# Patient Record
Sex: FEMALE | Race: WHITE | NOT HISPANIC OR LATINO | Employment: FULL TIME | URBAN - METROPOLITAN AREA
[De-identification: names, ages, dates, MRNs, and addresses within clinical notes are randomized per-mention and may not be internally consistent; named-entity substitution may affect disease eponyms.]

---

## 2017-01-26 RX ORDER — CITALOPRAM 20 MG/1
TABLET ORAL
Qty: 30 TABLET | Refills: 0 | Status: SHIPPED | OUTPATIENT
Start: 2017-01-26 | End: 2017-02-01 | Stop reason: SDUPTHER

## 2017-02-01 RX ORDER — CITALOPRAM 20 MG/1
TABLET ORAL
Qty: 30 TABLET | Refills: 0 | Status: SHIPPED | OUTPATIENT
Start: 2017-02-01 | End: 2017-04-01 | Stop reason: SDUPTHER

## 2017-03-01 RX ORDER — ACETAMINOPHEN AND CODEINE PHOSPHATE 120; 12 MG/5ML; MG/5ML
SOLUTION ORAL
Qty: 28 TABLET | Refills: 0 | Status: SHIPPED | OUTPATIENT
Start: 2017-03-01 | End: 2017-05-26 | Stop reason: SDUPTHER

## 2017-03-06 RX ORDER — ACETAMINOPHEN AND CODEINE PHOSPHATE 120; 12 MG/5ML; MG/5ML
SOLUTION ORAL
Qty: 28 TABLET | Refills: 0 | Status: SHIPPED | OUTPATIENT
Start: 2017-03-06 | End: 2017-04-19 | Stop reason: SDUPTHER

## 2017-04-03 RX ORDER — CITALOPRAM 20 MG/1
TABLET ORAL
Qty: 30 TABLET | Refills: 2 | Status: SHIPPED | OUTPATIENT
Start: 2017-04-03 | End: 2017-04-19 | Stop reason: SDUPTHER

## 2017-04-19 ENCOUNTER — OFFICE VISIT (OUTPATIENT)
Dept: FAMILY MEDICINE CLINIC | Facility: CLINIC | Age: 37
End: 2017-04-19

## 2017-04-19 VITALS
HEART RATE: 90 BPM | SYSTOLIC BLOOD PRESSURE: 124 MMHG | HEIGHT: 66 IN | OXYGEN SATURATION: 98 % | BODY MASS INDEX: 44.2 KG/M2 | RESPIRATION RATE: 16 BRPM | WEIGHT: 275 LBS | DIASTOLIC BLOOD PRESSURE: 84 MMHG

## 2017-04-19 DIAGNOSIS — F32.89 OTHER DEPRESSION: ICD-10-CM

## 2017-04-19 DIAGNOSIS — I10 ESSENTIAL HYPERTENSION: Primary | ICD-10-CM

## 2017-04-19 DIAGNOSIS — Z85.850 HISTORY OF THYROID CANCER: ICD-10-CM

## 2017-04-19 DIAGNOSIS — J30.1 NON-SEASONAL ALLERGIC RHINITIS DUE TO POLLEN: ICD-10-CM

## 2017-04-19 PROCEDURE — 99214 OFFICE O/P EST MOD 30 MIN: CPT | Performed by: FAMILY MEDICINE

## 2017-04-19 RX ORDER — CITALOPRAM 20 MG/1
20 TABLET ORAL DAILY
Qty: 30 TABLET | Refills: 2 | Status: SHIPPED | OUTPATIENT
Start: 2017-04-19 | End: 2017-05-31 | Stop reason: SDUPTHER

## 2017-04-19 RX ORDER — CEFUROXIME AXETIL 250 MG/1
250 TABLET ORAL 2 TIMES DAILY
Qty: 20 TABLET | Refills: 0 | Status: SHIPPED | OUTPATIENT
Start: 2017-04-19 | End: 2017-11-22

## 2017-04-19 RX ORDER — LEVOTHYROXINE SODIUM 0.15 MG/1
150 TABLET ORAL DAILY
Qty: 30 TABLET | Refills: 3 | Status: SHIPPED | OUTPATIENT
Start: 2017-04-19 | End: 2017-07-27 | Stop reason: SDUPTHER

## 2017-04-19 RX ORDER — LOSARTAN POTASSIUM AND HYDROCHLOROTHIAZIDE 12.5; 5 MG/1; MG/1
1 TABLET ORAL DAILY
Qty: 30 TABLET | Refills: 3 | Status: SHIPPED | OUTPATIENT
Start: 2017-04-19 | End: 2017-10-30 | Stop reason: SDUPTHER

## 2017-04-19 RX ORDER — IBUPROFEN 800 MG/1
800 TABLET ORAL EVERY 8 HOURS PRN
Qty: 60 TABLET | Refills: 1 | Status: SHIPPED | OUTPATIENT
Start: 2017-04-19 | End: 2017-05-17 | Stop reason: SDUPTHER

## 2017-04-19 RX ORDER — FLUTICASONE PROPIONATE 50 MCG
1 SPRAY, SUSPENSION (ML) NASAL DAILY
Qty: 1 EACH | Refills: 3 | Status: SHIPPED | OUTPATIENT
Start: 2017-04-19 | End: 2017-10-02 | Stop reason: SDUPTHER

## 2017-04-19 RX ORDER — LEVOTHYROXINE SODIUM 175 UG/1
175 TABLET ORAL DAILY
Qty: 30 TABLET | Refills: 3 | Status: SHIPPED | OUTPATIENT
Start: 2017-04-19 | End: 2017-07-27 | Stop reason: SDUPTHER

## 2017-04-19 RX ORDER — AZELASTINE 1 MG/ML
2 SPRAY, METERED NASAL 2 TIMES DAILY
Qty: 1 EACH | Refills: 3 | Status: SHIPPED | OUTPATIENT
Start: 2017-04-19 | End: 2019-01-25

## 2017-04-19 RX ORDER — AZELASTINE 1 MG/ML
2 SPRAY, METERED NASAL 2 TIMES DAILY
COMMUNITY
End: 2017-04-19 | Stop reason: SDUPTHER

## 2017-04-19 NOTE — PROGRESS NOTES
Subjective   Laura Weathers is a 37 y.o. female.     Headache    This is a recurrent problem. The current episode started 1 to 4 weeks ago. The problem occurs intermittently. The problem has been unchanged. The pain is located in the frontal region. The pain does not radiate. The pain quality is similar to prior headaches. The quality of the pain is described as aching and band-like. The pain is at a severity of 8/10. The pain is moderate. Associated symptoms include photophobia and sinus pressure. Pertinent negatives include no back pain, coughing, dizziness, ear pain, eye pain, eye redness, fever, nausea, neck pain, numbness, rhinorrhea, sore throat or weakness. Associated symptoms comments: congestion. Exacerbated by: allergies. She has tried acetaminophen and NSAIDs (antibiotic for sinus infection) for the symptoms. The treatment provided mild relief.   Hypertension   This is a chronic problem. The current episode started more than 1 year ago. The problem is unchanged. The problem is controlled. Pertinent negatives include no chest pain, neck pain, palpitations or shortness of breath. There are no associated agents to hypertension. Risk factors for coronary artery disease include obesity, sedentary lifestyle and family history. Past treatments include angiotensin blockers and diuretics. The current treatment provides significant improvement. There are no compliance problems.    Depression   Visit Type: initial  Onset of symptoms: more than 6 months ago  Patient presents with the following symptoms: weight gain.  Patient is not experiencing: confusion, decreased concentration, excessive worry, feelings of hopelessness, feelings of worthlessness, nervousness/anxiety, palpitations, panic, psychomotor retardation, shortness of breath, suicidal ideas and suicidal planning.  Frequency of symptoms: rarely   Severity: mild   Sleep per night: 8 hours  Sleep quality: good  Nighttime awakenings: occasional  Risk factors: no  known risk factors  Treatment tried: nothing           The following portions of the patient's history were reviewed and updated as appropriate: allergies, current medications, past family history, past medical history, past social history, past surgical history and problem list.    Review of Systems   Constitutional: Positive for weight gain. Negative for appetite change, chills, diaphoresis, fatigue, fever and unexpected weight change.   HENT: Positive for sinus pressure. Negative for congestion, ear pain, mouth sores, postnasal drip, rhinorrhea, sneezing, sore throat and trouble swallowing.    Eyes: Positive for photophobia. Negative for pain, redness and visual disturbance.   Respiratory: Negative for apnea, cough, chest tightness, shortness of breath and wheezing.    Cardiovascular: Negative for chest pain, palpitations and leg swelling.   Gastrointestinal: Negative for abdominal distention, blood in stool, constipation, diarrhea and nausea.   Endocrine: Negative for cold intolerance, polydipsia, polyphagia and polyuria.   Genitourinary: Negative for difficulty urinating, dysuria, enuresis, flank pain and urgency.   Musculoskeletal: Negative for arthralgias, back pain, joint swelling, myalgias and neck pain.   Skin: Negative for color change, rash and wound.   Neurological: Negative for dizziness, syncope, weakness, light-headedness and numbness.   Hematological: Negative for adenopathy.   Psychiatric/Behavioral: Negative for agitation, behavioral problems, confusion, decreased concentration and suicidal ideas. The patient is not nervous/anxious.        Objective   Physical Exam   Constitutional: She is oriented to person, place, and time. She appears well-developed and well-nourished. No distress.   HENT:   Right Ear: External ear normal.   Left Ear: External ear normal.   Nose: Nose normal.   Mouth/Throat: Oropharynx is clear and moist.   Eyes: Conjunctivae and EOM are normal. Pupils are equal, round, and  reactive to light.   Neck: Normal range of motion. Neck supple. No thyromegaly present.   Cardiovascular: Normal rate, regular rhythm and normal heart sounds.    No murmur heard.  Pulmonary/Chest: Effort normal and breath sounds normal. No respiratory distress. She has no wheezes.   Abdominal: Soft. Bowel sounds are normal. She exhibits no distension and no mass. There is no tenderness. There is no rebound and no guarding. No hernia.   Musculoskeletal: Normal range of motion. She exhibits no edema or tenderness.   Lymphadenopathy:     She has no cervical adenopathy.   Neurological: She is alert and oriented to person, place, and time. She has normal reflexes.   Skin: Skin is warm and dry. No rash noted. She is not diaphoretic. No erythema. No pallor.   Psychiatric: She has a normal mood and affect. Her behavior is normal. Judgment and thought content normal.   Nursing note and vitals reviewed.      Assessment/Plan   Laura was seen today for headache.    Diagnoses and all orders for this visit:    Essential hypertension    History of thyroid cancer  -     Ambulatory Referral to Endocrinology    Non-seasonal allergic rhinitis due to pollen    Other depression    Other orders  -     azelastine (ASTELIN) 0.1 % nasal spray; 2 sprays into each nostril 2 (Two) Times a Day. Use in each nostril as directed  -     citalopram (CeleXA) 20 MG tablet; Take 1 tablet by mouth Daily.  -     fluticasone (FLONASE) 50 MCG/ACT nasal spray; 1 spray into each nostril Daily.  -     losartan-hydrochlorothiazide (HYZAAR) 50-12.5 MG per tablet; Take 1 tablet by mouth Daily.  -     levothyroxine (SYNTHROID, LEVOTHROID) 175 MCG tablet; Take 1 tablet by mouth Daily.  -     levothyroxine (SYNTHROID, LEVOTHROID) 150 MCG tablet; Take 1 tablet by mouth Daily.  -     ibuprofen (ADVIL,MOTRIN) 800 MG tablet; Take 1 tablet by mouth Every 8 (Eight) Hours As Needed for Mild Pain (1-3).  -     cefuroxime (CEFTIN) 250 MG tablet; Take 1 tablet by mouth 2  (Two) Times a Day.        I personally spent over half of a total 25 minutes face to face with the patient in counseling and discussion and/or coordination of care as described above.

## 2017-05-17 RX ORDER — IBUPROFEN 800 MG/1
TABLET ORAL
Qty: 60 TABLET | Refills: 0 | Status: SHIPPED | OUTPATIENT
Start: 2017-05-17 | End: 2017-07-27 | Stop reason: SDUPTHER

## 2017-05-26 RX ORDER — ACETAMINOPHEN AND CODEINE PHOSPHATE 120; 12 MG/5ML; MG/5ML
SOLUTION ORAL
Qty: 28 TABLET | Refills: 0 | Status: SHIPPED | OUTPATIENT
Start: 2017-05-26 | End: 2017-06-29 | Stop reason: SDUPTHER

## 2017-06-01 RX ORDER — CITALOPRAM 20 MG/1
TABLET ORAL
Qty: 30 TABLET | Refills: 0 | Status: SHIPPED | OUTPATIENT
Start: 2017-06-01 | End: 2017-10-30 | Stop reason: SDUPTHER

## 2017-06-29 RX ORDER — ACETAMINOPHEN AND CODEINE PHOSPHATE 120; 12 MG/5ML; MG/5ML
SOLUTION ORAL
Qty: 28 TABLET | Refills: 0 | Status: SHIPPED | OUTPATIENT
Start: 2017-06-29 | End: 2017-07-27 | Stop reason: SDUPTHER

## 2017-07-28 RX ORDER — LEVOTHYROXINE SODIUM 175 UG/1
TABLET ORAL
Qty: 30 TABLET | Refills: 0 | Status: SHIPPED | OUTPATIENT
Start: 2017-07-28 | End: 2017-09-21 | Stop reason: SDUPTHER

## 2017-07-28 RX ORDER — ACETAMINOPHEN AND CODEINE PHOSPHATE 120; 12 MG/5ML; MG/5ML
SOLUTION ORAL
Qty: 28 TABLET | Refills: 0 | Status: SHIPPED | OUTPATIENT
Start: 2017-07-28 | End: 2017-07-31 | Stop reason: SDUPTHER

## 2017-07-28 RX ORDER — LEVOTHYROXINE SODIUM 0.15 MG/1
TABLET ORAL
Qty: 30 TABLET | Refills: 0 | Status: SHIPPED | OUTPATIENT
Start: 2017-07-28 | End: 2017-12-03 | Stop reason: SDUPTHER

## 2017-07-28 RX ORDER — IBUPROFEN 800 MG/1
TABLET ORAL
Qty: 60 TABLET | Refills: 0 | Status: SHIPPED | OUTPATIENT
Start: 2017-07-28 | End: 2017-07-31 | Stop reason: SDUPTHER

## 2017-08-01 RX ORDER — IBUPROFEN 800 MG/1
TABLET ORAL
Qty: 60 TABLET | Refills: 0 | Status: SHIPPED | OUTPATIENT
Start: 2017-08-01 | End: 2018-05-11

## 2017-08-01 RX ORDER — ACETAMINOPHEN AND CODEINE PHOSPHATE 120; 12 MG/5ML; MG/5ML
SOLUTION ORAL
Qty: 28 TABLET | Refills: 0 | Status: SHIPPED | OUTPATIENT
Start: 2017-08-01 | End: 2017-10-02 | Stop reason: SDUPTHER

## 2017-09-22 RX ORDER — LEVOTHYROXINE SODIUM 0.15 MG/1
TABLET ORAL
Qty: 30 TABLET | Refills: 0 | Status: SHIPPED | OUTPATIENT
Start: 2017-09-22 | End: 2017-11-22 | Stop reason: SDUPTHER

## 2017-09-22 RX ORDER — LEVOTHYROXINE SODIUM 175 UG/1
TABLET ORAL
Qty: 30 TABLET | Refills: 0 | Status: SHIPPED | OUTPATIENT
Start: 2017-09-22 | End: 2017-12-03 | Stop reason: DRUGHIGH

## 2017-10-02 RX ORDER — ACETAMINOPHEN AND CODEINE PHOSPHATE 120; 12 MG/5ML; MG/5ML
SOLUTION ORAL
Qty: 28 TABLET | Refills: 3 | Status: SHIPPED | OUTPATIENT
Start: 2017-10-02 | End: 2017-12-28 | Stop reason: SDUPTHER

## 2017-10-02 RX ORDER — FLUTICASONE PROPIONATE 50 MCG
SPRAY, SUSPENSION (ML) NASAL
Qty: 16 G | Refills: 3 | Status: SHIPPED | OUTPATIENT
Start: 2017-10-02 | End: 2018-01-25 | Stop reason: SDUPTHER

## 2017-10-23 RX ORDER — LEVOTHYROXINE SODIUM 0.15 MG/1
TABLET ORAL
Qty: 30 TABLET | Refills: 0 | OUTPATIENT
Start: 2017-10-23

## 2017-10-23 RX ORDER — LEVOTHYROXINE SODIUM 175 UG/1
TABLET ORAL
Qty: 30 TABLET | Refills: 0 | OUTPATIENT
Start: 2017-10-23

## 2017-10-30 RX ORDER — CITALOPRAM 20 MG/1
TABLET ORAL
Qty: 30 TABLET | Refills: 0 | Status: SHIPPED | OUTPATIENT
Start: 2017-10-30 | End: 2017-11-27 | Stop reason: SDUPTHER

## 2017-10-30 RX ORDER — LOSARTAN POTASSIUM AND HYDROCHLOROTHIAZIDE 12.5; 5 MG/1; MG/1
TABLET ORAL
Qty: 30 TABLET | Refills: 0 | Status: SHIPPED | OUTPATIENT
Start: 2017-10-30 | End: 2017-12-28 | Stop reason: SDUPTHER

## 2017-11-22 ENCOUNTER — OFFICE VISIT (OUTPATIENT)
Dept: ENDOCRINOLOGY | Facility: CLINIC | Age: 37
End: 2017-11-22

## 2017-11-22 VITALS
OXYGEN SATURATION: 99 % | SYSTOLIC BLOOD PRESSURE: 122 MMHG | HEART RATE: 85 BPM | WEIGHT: 251.4 LBS | BODY MASS INDEX: 40.4 KG/M2 | DIASTOLIC BLOOD PRESSURE: 80 MMHG | HEIGHT: 66 IN

## 2017-11-22 DIAGNOSIS — E89.0 POSTOPERATIVE HYPOTHYROIDISM: ICD-10-CM

## 2017-11-22 DIAGNOSIS — C73 FOLLICULAR THYROID CANCER (HCC): Primary | ICD-10-CM

## 2017-11-22 LAB
T4 FREE SERPL-MCNC: 1.54 NG/DL (ref 0.89–1.76)
TSH SERPL DL<=0.05 MIU/L-ACNC: 0.48 MIU/ML (ref 0.35–5.35)

## 2017-11-22 PROCEDURE — 84439 ASSAY OF FREE THYROXINE: CPT | Performed by: INTERNAL MEDICINE

## 2017-11-22 PROCEDURE — 99244 OFF/OP CNSLTJ NEW/EST MOD 40: CPT | Performed by: INTERNAL MEDICINE

## 2017-11-22 PROCEDURE — 84443 ASSAY THYROID STIM HORMONE: CPT | Performed by: INTERNAL MEDICINE

## 2017-11-22 PROCEDURE — 84432 ASSAY OF THYROGLOBULIN: CPT

## 2017-11-22 PROCEDURE — 86800 THYROGLOBULIN ANTIBODY: CPT | Performed by: INTERNAL MEDICINE

## 2017-11-22 RX ORDER — METHYLPREDNISOLONE 4 MG/1
TABLET ORAL
COMMUNITY
Start: 2017-11-18 | End: 2018-05-11

## 2017-11-26 LAB — REF LAB TEST RESULTS: NORMAL

## 2017-11-27 RX ORDER — CITALOPRAM 20 MG/1
TABLET ORAL
Qty: 30 TABLET | Refills: 0 | Status: SHIPPED | OUTPATIENT
Start: 2017-11-27 | End: 2017-12-28 | Stop reason: SDUPTHER

## 2017-12-03 RX ORDER — LEVOTHYROXINE SODIUM 0.15 MG/1
TABLET ORAL
Qty: 35 TABLET | Refills: 11 | Status: SHIPPED | OUTPATIENT
Start: 2017-12-03 | End: 2018-12-07 | Stop reason: SDUPTHER

## 2017-12-29 RX ORDER — LOSARTAN POTASSIUM AND HYDROCHLOROTHIAZIDE 12.5; 5 MG/1; MG/1
TABLET ORAL
Qty: 30 TABLET | Refills: 0 | Status: SHIPPED | OUTPATIENT
Start: 2017-12-29 | End: 2018-01-25 | Stop reason: SDUPTHER

## 2017-12-29 RX ORDER — CITALOPRAM 20 MG/1
TABLET ORAL
Qty: 30 TABLET | Refills: 0 | Status: SHIPPED | OUTPATIENT
Start: 2017-12-29 | End: 2018-01-25 | Stop reason: SDUPTHER

## 2017-12-29 RX ORDER — ACETAMINOPHEN AND CODEINE PHOSPHATE 120; 12 MG/5ML; MG/5ML
SOLUTION ORAL
Qty: 28 TABLET | Refills: 0 | Status: SHIPPED | OUTPATIENT
Start: 2017-12-29 | End: 2018-01-25 | Stop reason: SDUPTHER

## 2018-01-25 RX ORDER — LOSARTAN POTASSIUM AND HYDROCHLOROTHIAZIDE 12.5; 5 MG/1; MG/1
TABLET ORAL
Qty: 30 TABLET | Refills: 0 | Status: SHIPPED | OUTPATIENT
Start: 2018-01-25 | End: 2018-01-25 | Stop reason: SDUPTHER

## 2018-01-26 RX ORDER — FLUTICASONE PROPIONATE 50 MCG
SPRAY, SUSPENSION (ML) NASAL
Qty: 16 G | Refills: 0 | Status: SHIPPED | OUTPATIENT
Start: 2018-01-26 | End: 2018-03-22 | Stop reason: SDUPTHER

## 2018-01-26 RX ORDER — LOSARTAN POTASSIUM AND HYDROCHLOROTHIAZIDE 12.5; 5 MG/1; MG/1
TABLET ORAL
Qty: 30 TABLET | Refills: 0 | Status: SHIPPED | OUTPATIENT
Start: 2018-01-26 | End: 2018-03-22 | Stop reason: SDUPTHER

## 2018-01-26 RX ORDER — CITALOPRAM 20 MG/1
TABLET ORAL
Qty: 30 TABLET | Refills: 0 | Status: SHIPPED | OUTPATIENT
Start: 2018-01-26 | End: 2018-02-22 | Stop reason: SDUPTHER

## 2018-01-26 RX ORDER — ACETAMINOPHEN AND CODEINE PHOSPHATE 120; 12 MG/5ML; MG/5ML
SOLUTION ORAL
Qty: 28 TABLET | Refills: 0 | Status: SHIPPED | OUTPATIENT
Start: 2018-01-26 | End: 2018-03-22 | Stop reason: SDUPTHER

## 2018-02-26 RX ORDER — CITALOPRAM 20 MG/1
TABLET ORAL
Qty: 30 TABLET | Refills: 0 | Status: SHIPPED | OUTPATIENT
Start: 2018-02-26 | End: 2018-05-08 | Stop reason: SDUPTHER

## 2018-03-23 RX ORDER — LOSARTAN POTASSIUM AND HYDROCHLOROTHIAZIDE 12.5; 5 MG/1; MG/1
TABLET ORAL
Qty: 30 TABLET | Refills: 0 | Status: SHIPPED | OUTPATIENT
Start: 2018-03-23 | End: 2018-05-11 | Stop reason: SDUPTHER

## 2018-03-23 RX ORDER — FLUTICASONE PROPIONATE 50 MCG
SPRAY, SUSPENSION (ML) NASAL
Qty: 16 G | Refills: 0 | Status: SHIPPED | OUTPATIENT
Start: 2018-03-23 | End: 2018-04-20 | Stop reason: SDUPTHER

## 2018-03-23 RX ORDER — ACETAMINOPHEN AND CODEINE PHOSPHATE 120; 12 MG/5ML; MG/5ML
SOLUTION ORAL
Qty: 28 TABLET | Refills: 0 | Status: SHIPPED | OUTPATIENT
Start: 2018-03-23 | End: 2018-04-20 | Stop reason: SDUPTHER

## 2018-04-05 RX ORDER — CITALOPRAM 20 MG/1
TABLET ORAL
Qty: 30 TABLET | Refills: 0 | OUTPATIENT
Start: 2018-04-05

## 2018-04-05 RX ORDER — LOSARTAN POTASSIUM AND HYDROCHLOROTHIAZIDE 12.5; 5 MG/1; MG/1
TABLET ORAL
Qty: 30 TABLET | Refills: 0 | OUTPATIENT
Start: 2018-04-05

## 2018-04-05 RX ORDER — ACETAMINOPHEN AND CODEINE PHOSPHATE 120; 12 MG/5ML; MG/5ML
SOLUTION ORAL
Qty: 28 TABLET | Refills: 0 | OUTPATIENT
Start: 2018-04-05

## 2018-04-20 RX ORDER — FLUTICASONE PROPIONATE 50 MCG
SPRAY, SUSPENSION (ML) NASAL
Qty: 16 G | Refills: 0 | Status: SHIPPED | OUTPATIENT
Start: 2018-04-20 | End: 2018-05-21 | Stop reason: SDUPTHER

## 2018-04-20 RX ORDER — ACETAMINOPHEN AND CODEINE PHOSPHATE 120; 12 MG/5ML; MG/5ML
SOLUTION ORAL
Qty: 28 TABLET | Refills: 0 | Status: SHIPPED | OUTPATIENT
Start: 2018-04-20 | End: 2018-05-11 | Stop reason: SDUPTHER

## 2018-04-27 RX ORDER — LOSARTAN POTASSIUM AND HYDROCHLOROTHIAZIDE 12.5; 5 MG/1; MG/1
TABLET ORAL
Qty: 30 TABLET | Refills: 0 | OUTPATIENT
Start: 2018-04-27

## 2018-04-27 RX ORDER — CITALOPRAM 20 MG/1
TABLET ORAL
Qty: 30 TABLET | Refills: 0 | OUTPATIENT
Start: 2018-04-27

## 2018-05-02 RX ORDER — LOSARTAN POTASSIUM AND HYDROCHLOROTHIAZIDE 12.5; 5 MG/1; MG/1
TABLET ORAL
Qty: 30 TABLET | Refills: 0 | OUTPATIENT
Start: 2018-05-02

## 2018-05-02 RX ORDER — CITALOPRAM 20 MG/1
TABLET ORAL
Qty: 30 TABLET | Refills: 0 | OUTPATIENT
Start: 2018-05-02

## 2018-05-08 ENCOUNTER — TELEPHONE (OUTPATIENT)
Dept: FAMILY MEDICINE CLINIC | Facility: CLINIC | Age: 38
End: 2018-05-08

## 2018-05-08 RX ORDER — CITALOPRAM 20 MG/1
20 TABLET ORAL DAILY
Qty: 30 TABLET | Refills: 0 | Status: SHIPPED | OUTPATIENT
Start: 2018-05-08 | End: 2018-05-11 | Stop reason: SDUPTHER

## 2018-05-08 NOTE — TELEPHONE ENCOUNTER
----- Message from Linda Young DO sent at 5/7/2018  6:27 PM EDT -----  #[id number] +no rf  ----- Message -----  From: Molly Lira MA  Sent: 5/7/2018   3:51 PM  To: Linda Young DO        ----- Message -----  From: Rosa Colvin  Sent: 5/7/2018   3:35 PM  To: Molly Lira MA    Pt called and made an appt for Friday 05/11/18 @12:15 but will run out of Celexa on Wednesday. Could she have enough to last until she comes in.

## 2018-05-11 ENCOUNTER — OFFICE VISIT (OUTPATIENT)
Dept: FAMILY MEDICINE CLINIC | Facility: CLINIC | Age: 38
End: 2018-05-11

## 2018-05-11 VITALS
BODY MASS INDEX: 44.36 KG/M2 | SYSTOLIC BLOOD PRESSURE: 124 MMHG | OXYGEN SATURATION: 98 % | WEIGHT: 276 LBS | HEART RATE: 80 BPM | DIASTOLIC BLOOD PRESSURE: 82 MMHG | HEIGHT: 66 IN | RESPIRATION RATE: 16 BRPM

## 2018-05-11 DIAGNOSIS — E78.49 OTHER HYPERLIPIDEMIA: ICD-10-CM

## 2018-05-11 DIAGNOSIS — I10 ESSENTIAL HYPERTENSION: Primary | ICD-10-CM

## 2018-05-11 DIAGNOSIS — F32.0 MILD SINGLE CURRENT EPISODE OF MAJOR DEPRESSIVE DISORDER (HCC): ICD-10-CM

## 2018-05-11 DIAGNOSIS — Z30.49 ENCOUNTER FOR SURVEILLANCE OF OTHER CONTRACEPTIVE: ICD-10-CM

## 2018-05-11 LAB
ALBUMIN SERPL-MCNC: 4.3 G/DL (ref 3.2–4.8)
ALBUMIN/GLOB SERPL: 1.7 G/DL (ref 1.5–2.5)
ALP SERPL-CCNC: 92 U/L (ref 25–100)
ALT SERPL W P-5'-P-CCNC: 32 U/L (ref 7–40)
ANION GAP SERPL CALCULATED.3IONS-SCNC: 6 MMOL/L (ref 3–11)
ARTICHOKE IGE QN: 136 MG/DL (ref 0–130)
AST SERPL-CCNC: 18 U/L (ref 0–33)
BASOPHILS # BLD AUTO: 0.03 10*3/MM3 (ref 0–0.2)
BASOPHILS NFR BLD AUTO: 0.4 % (ref 0–1)
BILIRUB SERPL-MCNC: 0.4 MG/DL (ref 0.3–1.2)
BUN BLD-MCNC: 12 MG/DL (ref 9–23)
BUN/CREAT SERPL: 15 (ref 7–25)
CALCIUM SPEC-SCNC: 9.2 MG/DL (ref 8.7–10.4)
CHLORIDE SERPL-SCNC: 103 MMOL/L (ref 99–109)
CHOLEST SERPL-MCNC: 182 MG/DL (ref 0–200)
CO2 SERPL-SCNC: 29 MMOL/L (ref 20–31)
CREAT BLD-MCNC: 0.8 MG/DL (ref 0.6–1.3)
DEPRECATED RDW RBC AUTO: 45.1 FL (ref 37–54)
EOSINOPHIL # BLD AUTO: 0.12 10*3/MM3 (ref 0–0.3)
EOSINOPHIL NFR BLD AUTO: 1.5 % (ref 0–3)
ERYTHROCYTE [DISTWIDTH] IN BLOOD BY AUTOMATED COUNT: 13.8 % (ref 11.3–14.5)
GFR SERPL CREATININE-BSD FRML MDRD: 79 ML/MIN/1.73
GLOBULIN UR ELPH-MCNC: 2.6 GM/DL
GLUCOSE BLD-MCNC: 82 MG/DL (ref 70–100)
HCT VFR BLD AUTO: 42.3 % (ref 34.5–44)
HDLC SERPL-MCNC: 33 MG/DL (ref 40–60)
HGB BLD-MCNC: 14.3 G/DL (ref 11.5–15.5)
IMM GRANULOCYTES # BLD: 0.02 10*3/MM3 (ref 0–0.03)
IMM GRANULOCYTES NFR BLD: 0.3 % (ref 0–0.6)
LYMPHOCYTES # BLD AUTO: 2.27 10*3/MM3 (ref 0.6–4.8)
LYMPHOCYTES NFR BLD AUTO: 29.2 % (ref 24–44)
MCH RBC QN AUTO: 30.2 PG (ref 27–31)
MCHC RBC AUTO-ENTMCNC: 33.8 G/DL (ref 32–36)
MCV RBC AUTO: 89.2 FL (ref 80–99)
MONOCYTES # BLD AUTO: 0.47 10*3/MM3 (ref 0–1)
MONOCYTES NFR BLD AUTO: 6 % (ref 0–12)
NEUTROPHILS # BLD AUTO: 4.89 10*3/MM3 (ref 1.5–8.3)
NEUTROPHILS NFR BLD AUTO: 62.9 % (ref 41–71)
PLATELET # BLD AUTO: 289 10*3/MM3 (ref 150–450)
PMV BLD AUTO: 10.7 FL (ref 6–12)
POTASSIUM BLD-SCNC: 4.3 MMOL/L (ref 3.5–5.5)
PROT SERPL-MCNC: 6.9 G/DL (ref 5.7–8.2)
RBC # BLD AUTO: 4.74 10*6/MM3 (ref 3.89–5.14)
SODIUM BLD-SCNC: 138 MMOL/L (ref 132–146)
TRIGL SERPL-MCNC: 169 MG/DL (ref 0–150)
TSH SERPL DL<=0.05 MIU/L-ACNC: 0.63 MIU/ML (ref 0.35–5.35)
WBC NRBC COR # BLD: 7.78 10*3/MM3 (ref 3.5–10.8)

## 2018-05-11 PROCEDURE — 80061 LIPID PANEL: CPT | Performed by: FAMILY MEDICINE

## 2018-05-11 PROCEDURE — 90632 HEPA VACCINE ADULT IM: CPT | Performed by: FAMILY MEDICINE

## 2018-05-11 PROCEDURE — 84443 ASSAY THYROID STIM HORMONE: CPT | Performed by: FAMILY MEDICINE

## 2018-05-11 PROCEDURE — 85025 COMPLETE CBC W/AUTO DIFF WBC: CPT | Performed by: FAMILY MEDICINE

## 2018-05-11 PROCEDURE — 80053 COMPREHEN METABOLIC PANEL: CPT | Performed by: FAMILY MEDICINE

## 2018-05-11 PROCEDURE — 36415 COLL VENOUS BLD VENIPUNCTURE: CPT | Performed by: FAMILY MEDICINE

## 2018-05-11 PROCEDURE — 90471 IMMUNIZATION ADMIN: CPT | Performed by: FAMILY MEDICINE

## 2018-05-11 PROCEDURE — 99214 OFFICE O/P EST MOD 30 MIN: CPT | Performed by: FAMILY MEDICINE

## 2018-05-11 RX ORDER — ACETAMINOPHEN AND CODEINE PHOSPHATE 120; 12 MG/5ML; MG/5ML
1 SOLUTION ORAL DAILY
Qty: 28 TABLET | Refills: 11 | Status: SHIPPED | OUTPATIENT
Start: 2018-05-11 | End: 2018-06-04 | Stop reason: SDUPTHER

## 2018-05-11 RX ORDER — CITALOPRAM 20 MG/1
20 TABLET ORAL DAILY
Qty: 30 TABLET | Refills: 3 | Status: SHIPPED | OUTPATIENT
Start: 2018-05-11 | End: 2018-07-02 | Stop reason: SDUPTHER

## 2018-05-11 RX ORDER — LOSARTAN POTASSIUM AND HYDROCHLOROTHIAZIDE 12.5; 5 MG/1; MG/1
1 TABLET ORAL DAILY
Qty: 30 TABLET | Refills: 3 | Status: SHIPPED | OUTPATIENT
Start: 2018-05-11 | End: 2018-07-27 | Stop reason: SDUPTHER

## 2018-05-11 NOTE — PROGRESS NOTES
Subjective   Laura Weathers is a 38 y.o. female.   Trying to exercise and diet.  Hypertension   This is a chronic problem. The current episode started more than 1 year ago. The problem is unchanged. The problem is controlled. Pertinent negatives include no blurred vision, chest pain, palpitations or shortness of breath. There are no associated agents to hypertension. Risk factors for coronary artery disease include obesity, sedentary lifestyle and family history. Current antihypertension treatment includes angiotensin blockers and diuretics. The current treatment provides significant improvement. There are no compliance problems.    Hyperlipidemia   This is a chronic problem. The current episode started more than 1 year ago. The problem is controlled. Recent lipid tests were reviewed and are normal. There are no known factors aggravating her hyperlipidemia. Pertinent negatives include no chest pain or shortness of breath. She is currently on no antihyperlipidemic treatment. There are no compliance problems.  Risk factors for coronary artery disease include obesity and hypertension.   Depression   Visit Type: initial  Onset of symptoms: more than 6 months ago  Patient presents with the following symptoms: weight gain.  Patient is not experiencing: confusion, decreased concentration, depressed mood, excessive worry, feelings of hopelessness, feelings of worthlessness, nervousness/anxiety, palpitations, panic, psychomotor retardation, shortness of breath, suicidal ideas, suicidal planning and weight loss.  Frequency of symptoms: rarely   Severity: mild   Sleep per night: 8 hours  Sleep quality: good  Nighttime awakenings: occasional  Risk factors: no known risk factors  Treatment tried: nothing           The following portions of the patient's history were reviewed and updated as appropriate: allergies, current medications, past family history, past medical history, past social history, past surgical history and problem  list.    Review of Systems   Constitutional: Positive for unexpected weight gain. Negative for activity change, fatigue, fever and unexpected weight loss.   Eyes: Negative.  Negative for blurred vision, double vision and visual disturbance.   Respiratory: Negative for cough, chest tightness, shortness of breath and wheezing.    Cardiovascular: Negative for chest pain and palpitations.   Gastrointestinal: Negative.  Negative for abdominal distention, abdominal pain, blood in stool, constipation, diarrhea and nausea.   Endocrine: Negative.  Negative for cold intolerance and heat intolerance.   Genitourinary: Negative for urinary incontinence, dysuria, frequency and urgency.   Skin: Negative.  Negative for rash.   Allergic/Immunologic: Negative.    Neurological: Negative for confusion.   Hematological: Negative.  Negative for adenopathy.   Psychiatric/Behavioral: Negative for decreased concentration, suicidal ideas and depressed mood. The patient is not nervous/anxious.    All other systems reviewed and are negative.      Objective   Physical Exam   Constitutional: She is oriented to person, place, and time. She appears well-developed and well-nourished.   HENT:   Head: Normocephalic.   Right Ear: External ear normal.   Left Ear: External ear normal.   Nose: Nose normal.   Mouth/Throat: Oropharynx is clear and moist. No oropharyngeal exudate.   Eyes: Conjunctivae and EOM are normal. Pupils are equal, round, and reactive to light.   Neck: Normal range of motion. Neck supple. No thyromegaly present.   Cardiovascular: Normal rate, regular rhythm, normal heart sounds and intact distal pulses.    No murmur heard.  Pulmonary/Chest: Effort normal and breath sounds normal. No respiratory distress. She exhibits no tenderness.   Abdominal: Soft. Bowel sounds are normal. She exhibits no distension and no mass. There is no tenderness. There is no rebound and no guarding.   Musculoskeletal: Normal range of motion.    Lymphadenopathy:     She has no cervical adenopathy.   Neurological: She is alert and oriented to person, place, and time. She has normal reflexes. She displays normal reflexes. She exhibits normal muscle tone. Coordination normal.   Skin: Skin is warm and dry. No rash noted. She is not diaphoretic. No erythema.   Psychiatric: She has a normal mood and affect. Her behavior is normal. Judgment and thought content normal.   Nursing note and vitals reviewed.        Assessment/Plan   Laura was seen today for hypertension and hyperlipidemia.    Diagnoses and all orders for this visit:    Essential hypertension  -     CBC & Differential  -     Comprehensive Metabolic Panel  -     Lipid Panel  -     TSH  -     hepatitis A (HAVRIX) vaccine 1,440 Units; Inject 1 mL into the shoulder, thigh, or buttocks During Hospitalization for Immunization.  -     CBC Auto Differential    Encounter for surveillance of other contraceptive    Other hyperlipidemia    Mild single current episode of major depressive disorder    Other orders  -     citalopram (CeleXA) 20 MG tablet; Take 1 tablet by mouth Daily.  -     norethindrone (MICRONOR) 0.35 MG tablet; Take 1 tablet by mouth Daily.  -     losartan-hydrochlorothiazide (HYZAAR) 50-12.5 MG per tablet; Take 1 tablet by mouth Daily.    Discussed the nature of the disease including, risks, complications, implications, management, safe and proper use of medications. Encouraged therapeutic lifestyle changes including low calorie diet with plenty of fruits and vegetables, daily exercise, medication compliance, and keeping scheduled follow up appointments with me and any other providers. Encouraged patient to have appointment for complete physical, fasting labs, appropriate screenings, and immunizations on an annual basis.  I personally spent over half of a total 25 minutes face to face with the patient in counseling and discussion and/or coordination of care as described above.

## 2018-05-21 RX ORDER — FLUTICASONE PROPIONATE 50 MCG
SPRAY, SUSPENSION (ML) NASAL
Qty: 16 G | Refills: 0 | Status: SHIPPED | OUTPATIENT
Start: 2018-05-21 | End: 2019-01-25

## 2018-05-23 ENCOUNTER — OFFICE VISIT (OUTPATIENT)
Dept: ENDOCRINOLOGY | Facility: CLINIC | Age: 38
End: 2018-05-23

## 2018-05-23 VITALS
HEART RATE: 96 BPM | DIASTOLIC BLOOD PRESSURE: 72 MMHG | SYSTOLIC BLOOD PRESSURE: 106 MMHG | OXYGEN SATURATION: 100 % | BODY MASS INDEX: 43.23 KG/M2 | WEIGHT: 269 LBS | HEIGHT: 66 IN

## 2018-05-23 DIAGNOSIS — C73 FOLLICULAR THYROID CANCER (HCC): Primary | ICD-10-CM

## 2018-05-23 DIAGNOSIS — E89.0 POSTOPERATIVE HYPOTHYROIDISM: ICD-10-CM

## 2018-05-23 PROCEDURE — 76536 US EXAM OF HEAD AND NECK: CPT | Performed by: INTERNAL MEDICINE

## 2018-05-23 PROCEDURE — 99214 OFFICE O/P EST MOD 30 MIN: CPT | Performed by: INTERNAL MEDICINE

## 2018-06-05 RX ORDER — ACETAMINOPHEN AND CODEINE PHOSPHATE 120; 12 MG/5ML; MG/5ML
SOLUTION ORAL
Qty: 28 TABLET | Refills: 11 | Status: SHIPPED | OUTPATIENT
Start: 2018-06-05 | End: 2019-06-01 | Stop reason: SDUPTHER

## 2018-07-03 RX ORDER — CITALOPRAM 20 MG/1
TABLET ORAL
Qty: 30 TABLET | Refills: 3 | Status: SHIPPED | OUTPATIENT
Start: 2018-07-03 | End: 2018-12-13 | Stop reason: SDUPTHER

## 2018-07-27 RX ORDER — LOSARTAN POTASSIUM AND HYDROCHLOROTHIAZIDE 12.5; 5 MG/1; MG/1
TABLET ORAL
Qty: 30 TABLET | Refills: 2 | Status: SHIPPED | OUTPATIENT
Start: 2018-07-27 | End: 2018-11-28 | Stop reason: SDUPTHER

## 2018-08-02 RX ORDER — LOSARTAN POTASSIUM AND HYDROCHLOROTHIAZIDE 12.5; 5 MG/1; MG/1
TABLET ORAL
Qty: 30 TABLET | Refills: 0 | Status: SHIPPED | OUTPATIENT
Start: 2018-08-02 | End: 2018-11-28 | Stop reason: SDUPTHER

## 2018-10-19 RX ORDER — LOSARTAN POTASSIUM AND HYDROCHLOROTHIAZIDE 12.5; 5 MG/1; MG/1
TABLET ORAL
Qty: 30 TABLET | Refills: 0 | Status: SHIPPED | OUTPATIENT
Start: 2018-10-19 | End: 2019-01-21 | Stop reason: SDUPTHER

## 2018-11-28 ENCOUNTER — OFFICE VISIT (OUTPATIENT)
Dept: ENDOCRINOLOGY | Facility: CLINIC | Age: 38
End: 2018-11-28

## 2018-11-28 VITALS
SYSTOLIC BLOOD PRESSURE: 130 MMHG | HEART RATE: 90 BPM | WEIGHT: 272 LBS | DIASTOLIC BLOOD PRESSURE: 88 MMHG | HEIGHT: 66 IN | BODY MASS INDEX: 43.71 KG/M2 | OXYGEN SATURATION: 99 %

## 2018-11-28 DIAGNOSIS — E89.0 POSTOPERATIVE HYPOTHYROIDISM: ICD-10-CM

## 2018-11-28 DIAGNOSIS — C73 FOLLICULAR THYROID CANCER (HCC): Primary | ICD-10-CM

## 2018-11-28 LAB
T4 FREE SERPL-MCNC: 1.49 NG/DL (ref 0.89–1.76)
TSH SERPL DL<=0.05 MIU/L-ACNC: 0.04 MIU/ML (ref 0.35–5.35)

## 2018-11-28 PROCEDURE — 84432 ASSAY OF THYROGLOBULIN: CPT | Performed by: INTERNAL MEDICINE

## 2018-11-28 PROCEDURE — 84443 ASSAY THYROID STIM HORMONE: CPT | Performed by: INTERNAL MEDICINE

## 2018-11-28 PROCEDURE — 99214 OFFICE O/P EST MOD 30 MIN: CPT | Performed by: INTERNAL MEDICINE

## 2018-11-28 PROCEDURE — 86800 THYROGLOBULIN ANTIBODY: CPT

## 2018-11-28 PROCEDURE — 84439 ASSAY OF FREE THYROXINE: CPT | Performed by: INTERNAL MEDICINE

## 2018-11-28 RX ORDER — CETIRIZINE HYDROCHLORIDE 10 MG/1
10 TABLET ORAL DAILY
COMMUNITY
End: 2019-01-25

## 2018-11-30 LAB — REF LAB TEST RESULTS: NORMAL

## 2018-12-07 ENCOUNTER — TELEPHONE (OUTPATIENT)
Dept: ENDOCRINOLOGY | Facility: CLINIC | Age: 38
End: 2018-12-07

## 2018-12-07 DIAGNOSIS — E89.0 POSTOPERATIVE HYPOTHYROIDISM: Primary | ICD-10-CM

## 2018-12-07 DIAGNOSIS — C73 FOLLICULAR THYROID CANCER (HCC): ICD-10-CM

## 2018-12-07 RX ORDER — LEVOTHYROXINE SODIUM 0.15 MG/1
TABLET ORAL
Qty: 35 TABLET | Refills: 11 | Status: SHIPPED | OUTPATIENT
Start: 2018-12-07 | End: 2019-11-19 | Stop reason: SDUPTHER

## 2018-12-07 NOTE — TELEPHONE ENCOUNTER
Spoke to patient about lab results. See clinic note from 11/28/2018 for details.   Valeria Lara MD

## 2018-12-13 RX ORDER — CITALOPRAM 20 MG/1
TABLET ORAL
Qty: 30 TABLET | Refills: 0 | Status: SHIPPED | OUTPATIENT
Start: 2018-12-13 | End: 2019-01-25 | Stop reason: SDUPTHER

## 2019-01-21 ENCOUNTER — OFFICE VISIT (OUTPATIENT)
Dept: FAMILY MEDICINE CLINIC | Facility: CLINIC | Age: 39
End: 2019-01-21

## 2019-01-21 VITALS
OXYGEN SATURATION: 98 % | RESPIRATION RATE: 18 BRPM | HEART RATE: 90 BPM | HEIGHT: 66 IN | BODY MASS INDEX: 43.29 KG/M2 | SYSTOLIC BLOOD PRESSURE: 128 MMHG | WEIGHT: 269.38 LBS | DIASTOLIC BLOOD PRESSURE: 92 MMHG | TEMPERATURE: 98 F

## 2019-01-21 DIAGNOSIS — R05.9 COUGH: ICD-10-CM

## 2019-01-21 DIAGNOSIS — J01.00 ACUTE NON-RECURRENT MAXILLARY SINUSITIS: Primary | ICD-10-CM

## 2019-01-21 PROBLEM — E03.9 HYPOTHYROIDISM: Status: ACTIVE | Noted: 2017-11-25

## 2019-01-21 PROCEDURE — 99214 OFFICE O/P EST MOD 30 MIN: CPT | Performed by: NURSE PRACTITIONER

## 2019-01-21 RX ORDER — LOSARTAN POTASSIUM AND HYDROCHLOROTHIAZIDE 12.5; 5 MG/1; MG/1
TABLET ORAL
Qty: 30 TABLET | Refills: 2 | Status: SHIPPED | OUTPATIENT
Start: 2019-01-21 | End: 2019-07-18 | Stop reason: SDUPTHER

## 2019-01-21 RX ORDER — CEFDINIR 300 MG/1
300 CAPSULE ORAL 2 TIMES DAILY
Qty: 20 CAPSULE | Refills: 0 | Status: SHIPPED | OUTPATIENT
Start: 2019-01-21 | End: 2019-01-25

## 2019-01-21 RX ORDER — BENZONATATE 100 MG/1
100 CAPSULE ORAL 3 TIMES DAILY PRN
Qty: 21 CAPSULE | Refills: 0 | Status: SHIPPED | OUTPATIENT
Start: 2019-01-21 | End: 2019-01-25

## 2019-01-21 RX ORDER — DEXTROMETHORPHAN HYDROBROMIDE AND PROMETHAZINE HYDROCHLORIDE 15; 6.25 MG/5ML; MG/5ML
5 SYRUP ORAL NIGHTLY PRN
Qty: 118 ML | Refills: 0 | Status: SHIPPED | OUTPATIENT
Start: 2019-01-21 | End: 2019-01-25

## 2019-01-21 NOTE — PATIENT INSTRUCTIONS
Sinusitis, Adult  Sinusitis is soreness and inflammation of your sinuses. Sinuses are hollow spaces in the bones around your face. Your sinuses are located:  · Around your eyes.  · In the middle of your forehead.  · Behind your nose.  · In your cheekbones.    Your sinuses and nasal passages are lined with a stringy fluid (mucus). Mucus normally drains out of your sinuses. When your nasal tissues become inflamed or swollen, the mucus can become trapped or blocked so air cannot flow through your sinuses. This allows bacteria, viruses, and funguses to grow, which leads to infection.  Sinusitis can develop quickly and last for 7?10 days (acute) or for more than 12 weeks (chronic). Sinusitis often develops after a cold.  What are the causes?  This condition is caused by anything that creates swelling in the sinuses or stops mucus from draining, including:  · Allergies.  · Asthma.  · Bacterial or viral infection.  · Abnormally shaped bones between the nasal passages.  · Nasal growths that contain mucus (nasal polyps).  · Narrow sinus openings.  · Pollutants, such as chemicals or irritants in the air.  · A foreign object stuck in the nose.  · A fungal infection. This is rare.    What increases the risk?  The following factors may make you more likely to develop this condition:  · Having allergies or asthma.  · Having had a recent cold or respiratory tract infection.  · Having structural deformities or blockages in your nose or sinuses.  · Having a weak immune system.  · Doing a lot of swimming or diving.  · Overusing nasal sprays.  · Smoking.    What are the signs or symptoms?  The main symptoms of this condition are pain and a feeling of pressure around the affected sinuses. Other symptoms include:  · Upper toothache.  · Earache.  · Headache.  · Bad breath.  · Decreased sense of smell and taste.  · A cough that may get worse at night.  · Fatigue.  · Fever.  · Thick drainage from your nose. The drainage is often green and  it may contain pus (purulent).  · Stuffy nose or congestion.  · Postnasal drip. This is when extra mucus collects in the throat or back of the nose.  · Swelling and warmth over the affected sinuses.  · Sore throat.  · Sensitivity to light.    How is this diagnosed?  This condition is diagnosed based on symptoms, a medical history, and a physical exam. To find out if your condition is acute or chronic, your health care provider may:  · Look in your nose for signs of nasal polyps.  · Tap over the affected sinus to check for signs of infection.  · View the inside of your sinuses using an imaging device that has a light attached (endoscope).    If your health care provider suspects that you have chronic sinusitis, you may also:  · Be tested for allergies.  · Have a sample of mucus taken from your nose (nasal culture) and checked for bacteria.  · Have a mucus sample examined to see if your sinusitis is related to an allergy.    If your sinusitis does not respond to treatment and it lasts longer than 8 weeks, you may have an MRI or CT scan to check your sinuses. These scans also help to determine how severe your infection is.  In rare cases, a bone biopsy may be done to rule out more serious types of fungal sinus disease.  How is this treated?  Treatment for sinusitis depends on the cause and whether your condition is chronic or acute. If a virus is causing your sinusitis, your symptoms will go away on their own within 10 days. You may be given medicines to relieve your symptoms, including:  · Topical nasal decongestants. They shrink swollen nasal passages and let mucus drain from your sinuses.  · Antihistamines. These drugs block inflammation that is triggered by allergies. This can help to ease swelling in your nose and sinuses.  · Topical nasal corticosteroids. These are nasal sprays that ease inflammation and swelling in your nose and sinuses.  · Nasal saline washes. These rinses can help to get rid of thick mucus in  your nose.    If your condition is caused by bacteria, you will be given an antibiotic medicine. If your condition is caused by a fungus, you will be given an antifungal medicine.  Surgery may be needed to correct underlying conditions, such as narrow nasal passages. Surgery may also be needed to remove polyps.  Follow these instructions at home:  Medicines  · Take, use, or apply over-the-counter and prescription medicines only as told by your health care provider. These may include nasal sprays.  · If you were prescribed an antibiotic medicine, take it as told by your health care provider. Do not stop taking the antibiotic even if you start to feel better.  Hydrate and Humidify  · Drink enough water to keep your urine clear or pale yellow. Staying hydrated will help to thin your mucus.  · Use a cool mist humidifier to keep the humidity level in your home above 50%.  · Inhale steam for 10-15 minutes, 3-4 times a day or as told by your health care provider. You can do this in the bathroom while a hot shower is running.  · Limit your exposure to cool or dry air.  Rest  · Rest as much as possible.  · Sleep with your head raised (elevated).  · Make sure to get enough sleep each night.  General instructions  · Apply a warm, moist washcloth to your face 3-4 times a day or as told by your health care provider. This will help with discomfort.  · Wash your hands often with soap and water to reduce your exposure to viruses and other germs. If soap and water are not available, use hand .  · Do not smoke. Avoid being around people who are smoking (secondhand smoke).  · Keep all follow-up visits as told by your health care provider. This is important.  Contact a health care provider if:  · You have a fever.  · Your symptoms get worse.  · Your symptoms do not improve within 10 days.  Get help right away if:  · You have a severe headache.  · You have persistent vomiting.  · You have pain or swelling around your face or  eyes.  · You have vision problems.  · You develop confusion.  · Your neck is stiff.  · You have trouble breathing.  This information is not intended to replace advice given to you by your health care provider. Make sure you discuss any questions you have with your health care provider.  Document Released: 12/18/2006 Document Revised: 08/13/2017 Document Reviewed: 10/12/2016  [employer] Interactive Patient Education © 2018 [employer]    Cough, Adult  Coughing is a reflex that clears your throat and your airways. Coughing helps to heal and protect your lungs. It is normal to cough occasionally, but a cough that happens with other symptoms or lasts a long time may be a sign of a condition that needs treatment. A cough may last only 2-3 weeks (acute), or it may last longer than 8 weeks (chronic).  What are the causes?  Coughing is commonly caused by:  · Breathing in substances that irritate your lungs.  · A viral or bacterial respiratory infection.  · Allergies.  · Asthma.  · Postnasal drip.  · Smoking.  · Acid backing up from the stomach into the esophagus (gastroesophageal reflux).  · Certain medicines.  · Chronic lung problems, including COPD (or rarely, lung cancer).  · Other medical conditions such as heart failure.    Follow these instructions at home:  Pay attention to any changes in your symptoms. Take these actions to help with your discomfort:  · Take medicines only as told by your health care provider.  ? If you were prescribed an antibiotic medicine, take it as told by your health care provider. Do not stop taking the antibiotic even if you start to feel better.  ? Talk with your health care provider before you take a cough suppressant medicine.  · Drink enough fluid to keep your urine clear or pale yellow.  · If the air is dry, use a cold steam vaporizer or humidifier in your bedroom or your home to help loosen secretions.  · Avoid anything that causes you to cough at work or at home.  · If your cough is  worse at night, try sleeping in a semi-upright position.  · Avoid cigarette smoke. If you smoke, quit smoking. If you need help quitting, ask your health care provider.  · Avoid caffeine.  · Avoid alcohol.  · Rest as needed.    Contact a health care provider if:  · You have new symptoms.  · You cough up pus.  · Your cough does not get better after 2-3 weeks, or your cough gets worse.  · You cannot control your cough with suppressant medicines and you are losing sleep.  · You develop pain that is getting worse or pain that is not controlled with pain medicines.  · You have a fever.  · You have unexplained weight loss.  · You have night sweats.  Get help right away if:  · You cough up blood.  · You have difficulty breathing.  · Your heartbeat is very fast.  This information is not intended to replace advice given to you by your health care provider. Make sure you discuss any questions you have with your health care provider.  Document Released: 06/15/2012 Document Revised: 05/25/2017 Document Reviewed: 02/24/2016  [employer] Interactive Patient Education © 2018 [employer]  Cefdinir capsules  What is this medicine?  CEFDINIR (SEF di ner) is a cephalosporin antibiotic. It is used to treat certain kinds of bacterial infections. It will not work for colds, flu, or other viral infections.  This medicine may be used for other purposes; ask your health care provider or pharmacist if you have questions.  COMMON BRAND NAME(S): Omnicef  What should I tell my health care provider before I take this medicine?  They need to know if you have any of these conditions:  -bleeding problems  -kidney disease  -stomach or intestine problems (especially colitis)  -an unusual or allergic reaction to cefdinir, other cephalosporin antibiotics, penicillin, penicillamine, other foods, dyes or preservatives  -pregnant or trying to get pregnant  -breast-feeding  How should I use this medicine?  Take this medicine by mouth. Swallow it with a  drink of water. Follow the directions on the prescription label. You can take it with or without food. If it upsets your stomach it may help to take it with food. Take your doses at regular intervals. Do not take it more often than directed. Finish all the medicine you are prescribed even if you think your infection is better.  Talk to your pediatrician regarding the use of this medicine in children. Special care may be needed.  Overdosage: If you think you have taken too much of this medicine contact a poison control center or emergency room at once.  NOTE: This medicine is only for you. Do not share this medicine with others.  What if I miss a dose?  If you miss a dose, take it as soon as you can. If it is almost time for your next dose, take only that dose. Do not take double or extra doses.  What may interact with this medicine?  -antacids that contain aluminum or magnesium  -iron supplements  -other antibiotics  -probenecid  This list may not describe all possible interactions. Give your health care provider a list of all the medicines, herbs, non-prescription drugs, or dietary supplements you use. Also tell them if you smoke, drink alcohol, or use illegal drugs. Some items may interact with your medicine.  What should I watch for while using this medicine?  Tell your doctor or health care professional if your symptoms do not get better in a few days.  If you are diabetic you may get a false-positive result for sugar in your urine. Check with your doctor or health care professional before you change your diet or the dose of your diabetes medicine.  What side effects may I notice from receiving this medicine?  Side effects that you should report to your doctor or health care professional as soon as possible:  -allergic reactions like skin rash, itching or hives, swelling of the face, lips, or tongue  -bloody or watery diarrhea  -breathing problems  -fever  -redness, blistering, peeling or loosening of the skin,  including inside the mouth  -seizures  -trouble passing urine or change in the amount of urine  -unusual bleeding or bruising  -unusually weak or tired  Side effects that usually do not require medical attention (report to your doctor or health care professional if they continue or are bothersome):  -constipation  -diarrhea  -dizziness  -dry mouth  -headache  -loss of appetite  -nausea, vomiting  -stomach pain  -stool discoloration  -tiredness  -vaginal discharge, itching, or odor in women  This list may not describe all possible side effects. Call your doctor for medical advice about side effects. You may report side effects to FDA at 7-156-KNM-9459.  Where should I keep my medicine?  Keep out of the reach of children.  Store at room temperature between 15 and 30 degrees C (59 and 86 degrees F). Throw the medicine away after the expiration date.  NOTE: This sheet is a summary. It may not cover all possible information. If you have questions about this medicine, talk to your doctor, pharmacist, or health care provider.  © 2018 [employer] (2017-04-24 15:52:44)  Benzonatate capsules  What is this medicine?  BENZONATATE (katie NILSA na villalta) is used to treat cough.  This medicine may be used for other purposes; ask your health care provider or pharmacist if you have questions.  COMMON BRAND NAME(S): Cady Maxwell  What should I tell my health care provider before I take this medicine?  They need to know if you have any of these conditions:  -kidney or liver disease  -an unusual or allergic reaction to benzonatate, anesthetics, other medicines, foods, dyes, or preservatives  -pregnant or trying to get pregnant  -breast-feeding  How should I use this medicine?  Take this medicine by mouth with a glass of water. Follow the directions on the prescription label. Avoid breaking, chewing, or sucking the capsule, as this can cause serious side effects. Take your medicine at regular intervals. Do not take  your medicine more often than directed.  Talk to your pediatrician regarding the use of this medicine in children. While this drug may be prescribed for children as young as 10 years old for selected conditions, precautions do apply.  Overdosage: If you think you have taken too much of this medicine contact a poison control center or emergency room at once.  NOTE: This medicine is only for you. Do not share this medicine with others.  What if I miss a dose?  If you miss a dose, take it as soon as you can. If it is almost time for your next dose, take only that dose. Do not take double or extra doses.  What may interact with this medicine?  Do not take this medicine with any of the following medications:  -MAOIs like Carbex, Eldepryl, Marplan, Nardil, and Parnate  This list may not describe all possible interactions. Give your health care provider a list of all the medicines, herbs, non-prescription drugs, or dietary supplements you use. Also tell them if you smoke, drink alcohol, or use illegal drugs. Some items may interact with your medicine.  What should I watch for while using this medicine?  Tell your doctor if your symptoms do not improve or if they get worse. If you have a high fever, skin rash, or headache, see your health care professional.  You may get drowsy or dizzy. Do not drive, use machinery, or do anything that needs mental alertness until you know how this medicine affects you. Do not sit or stand up quickly, especially if you are an older patient. This reduces the risk of dizzy or fainting spells.  What side effects may I notice from receiving this medicine?  Side effects that you should report to your doctor or health care professional as soon as possible:  -allergic reactions like skin rash, itching or hives, swelling of the face, lips, or tongue  -breathing problems  -chest pain  -confusion or hallucinations  -irregular heartbeat  -numbness of mouth or throat  -seizures  Side effects that  usually do not require medical attention (report to your doctor or health care professional if they continue or are bothersome):  -burning feeling in the eyes  -constipation  -headache  -nasal congestion  -stomach upset  This list may not describe all possible side effects. Call your doctor for medical advice about side effects. You may report side effects to FDA at 5-499-HHT-8905.  Where should I keep my medicine?  Keep out of the reach of children.  Store at room temperature between 15 and 30 degrees C (59 and 86 degrees F). Keep tightly closed. Protect from light and moisture. Throw away any unused medicine after the expiration date.  NOTE: This sheet is a summary. It may not cover all possible information. If you have questions about this medicine, talk to your doctor, pharmacist, or health care provider.  © 2018 [employer] (2009-03-18 14:52:56)  Dextromethorphan; Promethazine oral solution  What is this medicine?  DEXTROMETHORPHAN; PROMETHAZINE (dex troe meth OR fan; proe METH a zeen) is a cough suppressant and an antihistamine. It is used to treat coughing due to colds or allergies. This medicine will not treat an infection.  This medicine may be used for other purposes; ask your health care provider or pharmacist if you have questions.  COMMON BRAND NAME(S): Phen Tuss DM  What should I tell my health care provider before I take this medicine?  They need to know if you have any of the following conditions:  -asthma or other lung disease  -diabetes  -eczema  -seizure disorder  -serious or chronic illness  -sleep apnea  -an unusual or allergic reaction to dextromethorphan, promethazine, phenothiazines, other medicines, foods, dyes, or preservatives  -pregnant or trying to get pregnant  -breast-feeding  How should I use this medicine?  Take this medicine by mouth with a glass of water. Follow the directions on the prescription label. Use a specially marked spoon or container to measure your medicine.  Household spoons are not accurate. Take your doses at regular intervals. Do not take your medicine more often than directed.  Talk to your pediatrician regarding the use of this medicine in children. Special care may be needed. Do not use this medicine in children less than 2 years of age.  Patients over 65 years old may have a stronger reaction and need a smaller dose.  Overdosage: If you think you have taken too much of this medicine contact a poison control center or emergency room at once.  NOTE: This medicine is only for you. Do not share this medicine with others.  What if I miss a dose?  If you miss a dose, take it as soon as you can. If it is almost time for your next dose, take only that dose. Do not take double or extra doses.  What may interact with this medicine?  Do not take this medicine with any of the following medications:  -MAOIs like Carbex, Eldepryl, Marplan, Nardil, and Parnate  This medicine may also interact with the following medications:  -alcohol or alcohol-containing products  -barbiturate medicines like phenobarbital  -epinephrine  -medicines for depression, anxiety or psychotic disturbances  -medicines for Parkinson's disease  -medicines for sleep  -medicines for the stomach like metoclopramide, dicyclomine, glycopyrrolate  -pain medicines  -radio contrast dyes  -sibutramine  -some medicines for cold or allergies  This list may not describe all possible interactions. Give your health care provider a list of all the medicines, herbs, non-prescription drugs, or dietary supplements you use. Also tell them if you smoke, drink alcohol, or use illegal drugs. Some items may interact with your medicine.  What should I watch for while using this medicine?  Tell your doctor if your symptoms do not improve or if they get worse.  You may get drowsy or dizzy. Do not drive, use machinery, or do anything that needs mental alertness until you know how this medicine affects you. Do not stand or sit up  quickly, especially if you are an older patient. This reduces the risk of dizzy or fainting spells. Alcohol may interfere with the effect of this medicine. Avoid alcoholic drinks.  This medicine can make you more sensitive to the sun. Keep out of the sun. If you cannot avoid being in the sun, wear protective clothing and use sunscreen. Do not use sun lamps or tanning beds/booths.  What side effects may I notice from receiving this medicine?  Side effects that you should report to your doctor or health care professional as soon as possible:  -allergic reactions like skin rash, itching or hives, swelling of the face, lips, or tongue  -breathing problems  -changes in vision  -confused, disoriented, excitable  -fast, irregular heartbeat  -fever, sweating  -hallucinations  -high or low blood pressure  -lightheaded  -muscle stiffness  -seizures  -tremors, twitches  -yellow eyes or skin  Side effects that usually do not require medical attention (report to your doctor or health care professional if they continue or are bothersome):  -congestion in the nose  -dry mouth  -nausea, vomiting  -stomach upset  -trouble sleeping  This list may not describe all possible side effects. Call your doctor for medical advice about side effects. You may report side effects to FDA at 7-983-FDA-8816.  Where should I keep my medicine?  Keep out of the reach of children.  Store at room temperature between 20 and 25 degrees C (68 and 77 degrees F). Protect from light. Throw away any unused medicine after the expiration date.  NOTE: This sheet is a summary. It may not cover all possible information. If you have questions about this medicine, talk to your doctor, pharmacist, or health care provider.  © 2018 [employer] (2009-04-06 17:01:49)

## 2019-01-21 NOTE — PROGRESS NOTES
Subjective   Laura Weathers is a 38 y.o. female.     URI    This is a new problem. The current episode started in the past 7 days. The problem has been gradually worsening. There has been no fever. Associated symptoms include congestion, coughing (frequent, interfering with sleep. Mostly non-productive, occasionally coughs up yellow-colored phlegm), a plugged ear sensation, sinus pain and a sore throat. Pertinent negatives include no abdominal pain, chest pain, diarrhea, dysuria, nausea, rash, vomiting or wheezing. Treatments tried: Flonase, antihistamines. The treatment provided mild relief.       The following portions of the patient's history were reviewed and updated as appropriate: allergies, current medications, past family history, past medical history, past social history, past surgical history and problem list.     No Known Allergies   Patient's last menstrual period was 01/17/2019 (approximate).     Review of Systems   Constitutional: Positive for fatigue. Negative for appetite change, chills, diaphoresis and fever.   HENT: Positive for congestion, postnasal drip, sinus pressure, sinus pain and sore throat.    Respiratory: Positive for cough (frequent, interfering with sleep. Mostly non-productive, occasionally coughs up yellow-colored phlegm). Negative for wheezing.    Cardiovascular: Negative for chest pain.   Gastrointestinal: Negative for abdominal pain, diarrhea, nausea and vomiting.   Genitourinary: Negative for dysuria.   Musculoskeletal: Negative for arthralgias and myalgias.   Skin: Negative for rash.       Objective   Physical Exam   Constitutional: She is oriented to person, place, and time. She appears well-developed and well-nourished. She is cooperative. No distress.   HENT:   Head: Normocephalic and atraumatic.   Right Ear: External ear normal. Tympanic membrane is injected and bulging. A middle ear effusion is present.   Left Ear: External ear normal. Tympanic membrane is injected and  bulging. A middle ear effusion is present.   Nose: Mucosal edema present. Right sinus exhibits maxillary sinus tenderness. Left sinus exhibits maxillary sinus tenderness.   Mouth/Throat: Uvula is midline and mucous membranes are normal. Posterior oropharyngeal erythema (moderate with cobblestoning) present.   Neck: Normal range of motion. Neck supple. No thyromegaly present.   Cardiovascular: Normal rate, regular rhythm and normal heart sounds.   Pulmonary/Chest: Effort normal and breath sounds normal.   Lymphadenopathy:     She has no cervical adenopathy.   Neurological: She is alert and oriented to person, place, and time.   Skin: Skin is warm and dry. No rash noted. She is not diaphoretic.   Psychiatric: She has a normal mood and affect. Her behavior is normal. Judgment and thought content normal.   Vitals reviewed.    Vitals:    01/21/19 0942   BP: 128/92   Pulse: 90   Resp: 18   Temp: 98 °F (36.7 °C)   SpO2: 98%   Body mass index is 43.48 kg/m².     Assessment/Plan   Laura was seen today for cough.    Diagnoses and all orders for this visit:    Acute non-recurrent maxillary sinusitis  -     cefdinir (OMNICEF) 300 MG capsule; Take 1 capsule by mouth 2 (Two) Times a Day for 10 days.    Cough  -     promethazine-dextromethorphan (PROMETHAZINE-DM) 6.25-15 MG/5ML syrup; Take 5 mL by mouth At Night As Needed for Cough.  -     benzonatate (TESSALON PERLES) 100 MG capsule; Take 1 capsule by mouth 3 (Three) Times a Day As Needed for Cough.       Discussed the nature of the medical condition(s) risks, complications, implications, management, safe and proper use of medications. Encouraged medication compliance, and keeping scheduled follow up appointments with me and any other providers.

## 2019-01-25 ENCOUNTER — OFFICE VISIT (OUTPATIENT)
Dept: FAMILY MEDICINE CLINIC | Facility: CLINIC | Age: 39
End: 2019-01-25

## 2019-01-25 VITALS
OXYGEN SATURATION: 98 % | HEART RATE: 86 BPM | HEIGHT: 66 IN | RESPIRATION RATE: 16 BRPM | DIASTOLIC BLOOD PRESSURE: 88 MMHG | TEMPERATURE: 97.6 F | SYSTOLIC BLOOD PRESSURE: 126 MMHG | WEIGHT: 270 LBS | BODY MASS INDEX: 43.39 KG/M2

## 2019-01-25 DIAGNOSIS — Z00.00 HEALTH CARE MAINTENANCE: Primary | ICD-10-CM

## 2019-01-25 DIAGNOSIS — F32.0 CURRENT MILD EPISODE OF MAJOR DEPRESSIVE DISORDER WITHOUT PRIOR EPISODE (HCC): ICD-10-CM

## 2019-01-25 DIAGNOSIS — Z12.31 ENCOUNTER FOR SCREENING MAMMOGRAM FOR BREAST CANCER: ICD-10-CM

## 2019-01-25 LAB
25(OH)D3 SERPL-MCNC: 14 NG/ML
ALBUMIN SERPL-MCNC: 4.53 G/DL (ref 3.2–4.8)
ALBUMIN/GLOB SERPL: 2.2 G/DL (ref 1.5–2.5)
ALP SERPL-CCNC: 85 U/L (ref 25–100)
ALT SERPL W P-5'-P-CCNC: 31 U/L (ref 7–40)
ANION GAP SERPL CALCULATED.3IONS-SCNC: 7 MMOL/L (ref 3–11)
ARTICHOKE IGE QN: 147 MG/DL (ref 0–130)
AST SERPL-CCNC: 22 U/L (ref 0–33)
BASOPHILS # BLD AUTO: 0.02 10*3/MM3 (ref 0–0.2)
BASOPHILS NFR BLD AUTO: 0.3 % (ref 0–1)
BILIRUB SERPL-MCNC: 0.6 MG/DL (ref 0.3–1.2)
BUN BLD-MCNC: 12 MG/DL (ref 9–23)
BUN/CREAT SERPL: 16 (ref 7–25)
CALCIUM SPEC-SCNC: 9.2 MG/DL (ref 8.7–10.4)
CHLORIDE SERPL-SCNC: 105 MMOL/L (ref 99–109)
CHOLEST SERPL-MCNC: 190 MG/DL (ref 0–200)
CO2 SERPL-SCNC: 26 MMOL/L (ref 20–31)
CREAT BLD-MCNC: 0.75 MG/DL (ref 0.6–1.3)
DEPRECATED RDW RBC AUTO: 43.7 FL (ref 37–54)
EOSINOPHIL # BLD AUTO: 0.21 10*3/MM3 (ref 0–0.3)
EOSINOPHIL NFR BLD AUTO: 3.1 % (ref 0–3)
ERYTHROCYTE [DISTWIDTH] IN BLOOD BY AUTOMATED COUNT: 13.5 % (ref 11.3–14.5)
GFR SERPL CREATININE-BSD FRML MDRD: 86 ML/MIN/1.73
GLOBULIN UR ELPH-MCNC: 2.1 GM/DL
GLUCOSE BLD-MCNC: 83 MG/DL (ref 70–100)
HCT VFR BLD AUTO: 42.8 % (ref 34.5–44)
HDLC SERPL-MCNC: 32 MG/DL (ref 40–60)
HGB BLD-MCNC: 14.6 G/DL (ref 11.5–15.5)
IMM GRANULOCYTES # BLD AUTO: 0.01 10*3/MM3 (ref 0–0.03)
IMM GRANULOCYTES NFR BLD AUTO: 0.1 % (ref 0–0.6)
LYMPHOCYTES # BLD AUTO: 1.76 10*3/MM3 (ref 0.6–4.8)
LYMPHOCYTES NFR BLD AUTO: 25.6 % (ref 24–44)
MCH RBC QN AUTO: 30.4 PG (ref 27–31)
MCHC RBC AUTO-ENTMCNC: 34.1 G/DL (ref 32–36)
MCV RBC AUTO: 89 FL (ref 80–99)
MONOCYTES # BLD AUTO: 0.41 10*3/MM3 (ref 0–1)
MONOCYTES NFR BLD AUTO: 6 % (ref 0–12)
NEUTROPHILS # BLD AUTO: 4.48 10*3/MM3 (ref 1.5–8.3)
NEUTROPHILS NFR BLD AUTO: 65 % (ref 41–71)
PLATELET # BLD AUTO: 247 10*3/MM3 (ref 150–450)
PMV BLD AUTO: 10.2 FL (ref 6–12)
POTASSIUM BLD-SCNC: 4.5 MMOL/L (ref 3.5–5.5)
PROT SERPL-MCNC: 6.6 G/DL (ref 5.7–8.2)
RBC # BLD AUTO: 4.81 10*6/MM3 (ref 3.89–5.14)
SODIUM BLD-SCNC: 138 MMOL/L (ref 132–146)
TRIGL SERPL-MCNC: 141 MG/DL (ref 0–150)
WBC NRBC COR # BLD: 6.88 10*3/MM3 (ref 3.5–10.8)

## 2019-01-25 PROCEDURE — 99212 OFFICE O/P EST SF 10 MIN: CPT | Performed by: FAMILY MEDICINE

## 2019-01-25 PROCEDURE — 36415 COLL VENOUS BLD VENIPUNCTURE: CPT | Performed by: FAMILY MEDICINE

## 2019-01-25 PROCEDURE — 85025 COMPLETE CBC W/AUTO DIFF WBC: CPT | Performed by: FAMILY MEDICINE

## 2019-01-25 PROCEDURE — 80053 COMPREHEN METABOLIC PANEL: CPT | Performed by: FAMILY MEDICINE

## 2019-01-25 PROCEDURE — 82306 VITAMIN D 25 HYDROXY: CPT | Performed by: FAMILY MEDICINE

## 2019-01-25 PROCEDURE — 83525 ASSAY OF INSULIN: CPT | Performed by: FAMILY MEDICINE

## 2019-01-25 PROCEDURE — 99396 PREV VISIT EST AGE 40-64: CPT | Performed by: FAMILY MEDICINE

## 2019-01-25 PROCEDURE — 80061 LIPID PANEL: CPT | Performed by: FAMILY MEDICINE

## 2019-01-25 RX ORDER — CITALOPRAM 20 MG/1
TABLET ORAL
Qty: 45 TABLET | Refills: 2 | Status: SHIPPED | OUTPATIENT
Start: 2019-01-25 | End: 2019-04-02 | Stop reason: SDUPTHER

## 2019-01-25 NOTE — PROGRESS NOTES
"Subjective   Laura Weathers is a 38 y.o. female and is here for a comprehensive physical exam. The patient reports need for celexa to be increased. Depression/anxiety worse this winter. Sleep stable. Has decreased motivation,  Increased fatigue.    Last health maintenance visit was 1 year . Overall they feel their health is good . Lives with spouse and children  Occupation is [place] in [place]. Patient's diet is in general, a \"healthy\" diet  . Exercises regularly irregularly . Tobacco use Never used. Alcohol use is none . Illicit drug no history of illicit drug use    Screening Tests  Vision Impairment. Uses Glasses/Contacts   Hearingnormal  Dental: Brushes does teeth twice a day . Dental exam every six months?yes  Mammogram up to date no  Pap smear no  Colonoscopy n/a  Dexa Scan n/a    Do you take any herbs or supplements that were not prescribed by a doctor? yes K+  Are you taking calcium supplements? no  Are you taking aspirin daily? no  Family history of ovarian cancer? no  Family history of breast cancer? no  FH of endometrial cancer? no  FH of cervical cancer? no  FH of colon cancer? no       History:  LMP: Patient's last menstrual period was 2019 (approximate).  Cycles are regular yes  Duration 5 days  Clots no  Pain no  Last pap date: >2 years  Abnormal pap? no  : 2  Para: 2      Immunization History  Tdap? yes  HPV? not applicable  Pneumonia? not applicable  Shingles? not applicable    The following portions of the patient's history were reviewed and updated as appropriate: allergies, current medications, past family history, past medical history, past social history, past surgical history and problem list.    Past Medical History:   Diagnosis Date   • Allergic    • Follicular thyroid cancer (CMS/[facility]) 2017    [id number] follicular thyroid cancer s/p two stage thyroidectomy and I -131 remnant ablation   • Hypertension    • Postoperative hypothyroidism 2013   • Preeclampsia     " delivered, with a postpartum complication       Family History   Problem Relation Age of Onset   • Hypertension Mother    • Heart disease Mother    • Arthritis Mother    • Stroke Mother    • Heart disease Sister        Past Surgical History:   Procedure Laterality Date   •  SECTION     • THYROIDECTOMY  2013    two stage thyroidectomy       Social History     Socioeconomic History   • Marital status:      Spouse name: Not on file   • Number of children: Not on file   • Years of education: Not on file   • Highest education level: Not on file   Social Needs   • Financial resource strain: Not on file   • Food insecurity - worry: Not on file   • Food insecurity - inability: Not on file   • Transportation needs - medical: Not on file   • Transportation needs - non-medical: Not on file   Occupational History   • Not on file   Tobacco Use   • Smoking status: Former Smoker   • Smokeless tobacco: Never Used   Substance and Sexual Activity   • Alcohol use: No   • Drug use: No   • Sexual activity: Yes     Partners: Male   Other Topics Concern   • Not on file   Social History Narrative   • Not on file       Review of Systems  Do you have pain that bothers you in your daily life? no  Review of Systems   Constitutional: Negative.  Negative for activity change, fatigue, fever and unexpected weight change.   HENT: Negative.  Negative for congestion, sneezing and sore throat.    Eyes: Negative.  Negative for visual disturbance.   Respiratory: Negative.  Negative for cough, chest tightness, shortness of breath and wheezing.    Cardiovascular: Negative.  Negative for chest pain, palpitations and leg swelling.   Gastrointestinal: Negative.  Negative for abdominal distention, abdominal pain, blood in stool, constipation, diarrhea and nausea.   Endocrine: Negative.  Negative for cold intolerance and heat intolerance.   Genitourinary: Negative.  Negative for dysuria, frequency and urgency.   Musculoskeletal: Negative.   Negative for arthralgias and myalgias.   Skin: Negative.  Negative for rash.   Allergic/Immunologic: Negative.    Neurological: Negative.  Negative for dizziness, syncope and numbness.   Hematological: Negative.  Negative for adenopathy.   Psychiatric/Behavioral: Negative.  Negative for suicidal ideas. The patient is not nervous/anxious.        Objective   Physical Exam   Constitutional: She is oriented to person, place, and time. She appears well-developed and well-nourished. No distress.   HENT:   Right Ear: External ear normal.   Left Ear: External ear normal.   Nose: Nose normal.   Mouth/Throat: Oropharynx is clear and moist.   Eyes: Conjunctivae and EOM are normal. Pupils are equal, round, and reactive to light.   Neck: Normal range of motion. Neck supple. No thyromegaly present.   Cardiovascular: Normal rate, regular rhythm and normal heart sounds.   No murmur heard.  Pulmonary/Chest: Effort normal and breath sounds normal. No respiratory distress. She has no wheezes. She exhibits no mass. Right breast exhibits no mass. Left breast exhibits no mass.   Abdominal: Soft. Bowel sounds are normal. She exhibits no distension and no mass. There is no tenderness. There is no rebound and no guarding. No hernia.   Genitourinary: Rectum normal, vagina normal and uterus normal. Cervix exhibits no motion tenderness. Right adnexum displays no mass. Left adnexum displays no mass.   Musculoskeletal: Normal range of motion. She exhibits no edema or tenderness.   Lymphadenopathy:     She has no cervical adenopathy.   Neurological: She is alert and oriented to person, place, and time. She has normal reflexes.   Skin: Skin is warm and dry. No rash noted. She is not diaphoretic. No erythema. No pallor.   Psychiatric: She has a normal mood and affect. Her behavior is normal. Judgment and thought content normal.   Nursing note and vitals reviewed.       Diagnoses and all orders for this visit:    Health care maintenance    Encounter  for screening mammogram for breast cancer    Current mild episode of major depressive disorder without prior episode (CMS/HCC)    Other orders  -     hepatitis A (HAVRIX) vaccine 1,440 Units; Inject 1 mL into the appropriate muscle as directed by prescriber During Hospitalization for Immunization.    Increase Celexa to 30 mg qd.     Patient Counseling:   --BMI: Discussed that it is not acceptable and appropriate                 Plan.  --Nutrition: Stressed importance of moderation in sodium/caffeine intake, saturated fat and cholesterol, caloric balance, sufficient intake of fresh fruits, vegetables, fiber, calcium, iron, and 1 mg of folate supplement per day (for females capable of pregnancy). Discussed in detail regarding plan based nutrition and cutting meat, dairy and increasing fruits and vegetables. Continue to discuss intermittent fasting as well.  ---Exercise: Stressed the importance of regular exercise.   --Substance Abuse: Discussed cessation/primary prevention of tobacco, alcohol, or other drug use; driving or other dangerous activities under the influence; availability of treatment for abuse.    --Sexuality: Discussed sexually transmitted diseases, partner selection, use of condoms, avoidance of unintended pregnancy  and contraceptive alternatives.   --Injury prevention: Discussed safety belts, safety helmets, smoke detector, smoking near bedding or upholstery.   --Dental health: Discussed importance of regular tooth brushing, flossing, and dental visits.  --Immunizations reviewed.  --Discussed benefits of mammogram  --Discussed benefits of screening colonoscopy.  --After hours service discussed with patient    Discussed the nature of the disease including, risks, complications, implications, management, safe and proper use of medications. Encouraged therapeutic lifestyle changes including healthy diet with plenty of fruits and vegetables, daily exercise, medication compliance, and keeping scheduled  follow up appointments with me and any other providers. Encouraged patient to have appointment for complete physical, fasting labs, appropriate screenings, and immunizations on an annual basis.       Follow up as needed for acute illness

## 2019-01-26 LAB — INSULIN SERPL-ACNC: 18 UIU/ML (ref 2.6–24.9)

## 2019-03-12 ENCOUNTER — HOSPITAL ENCOUNTER (OUTPATIENT)
Dept: MAMMOGRAPHY | Facility: HOSPITAL | Age: 39
Discharge: HOME | End: 2019-03-12
Attending: FAMILY MEDICINE

## 2019-03-12 DIAGNOSIS — Z12.31 ENCOUNTER FOR SCREENING MAMMOGRAM FOR BREAST CANCER: ICD-10-CM

## 2019-04-02 RX ORDER — CITALOPRAM 20 MG/1
TABLET ORAL
Qty: 45 TABLET | Refills: 0 | Status: SHIPPED | OUTPATIENT
Start: 2019-04-02 | End: 2019-07-09 | Stop reason: SDUPTHER

## 2019-04-02 RX ORDER — LOSARTAN POTASSIUM AND HYDROCHLOROTHIAZIDE 12.5; 5 MG/1; MG/1
TABLET ORAL
Qty: 30 TABLET | Refills: 0 | Status: SHIPPED | OUTPATIENT
Start: 2019-04-02 | End: 2019-05-29 | Stop reason: SDUPTHER

## 2019-04-06 RX ORDER — FLUTICASONE PROPIONATE 50 MCG
SPRAY, SUSPENSION (ML) NASAL
Qty: 16 G | Refills: 0 | Status: SHIPPED | OUTPATIENT
Start: 2019-04-06 | End: 2019-07-09 | Stop reason: SDUPTHER

## 2019-05-03 ENCOUNTER — HOSPITAL ENCOUNTER (OUTPATIENT)
Dept: MAMMOGRAPHY | Facility: HOSPITAL | Age: 39
Discharge: HOME | End: 2019-05-03
Admitting: FAMILY MEDICINE

## 2019-05-03 PROCEDURE — 77067 SCR MAMMO BI INCL CAD: CPT | Performed by: RADIOLOGY

## 2019-05-03 PROCEDURE — 77067 SCR MAMMO BI INCL CAD: CPT

## 2019-05-03 PROCEDURE — 77063 BREAST TOMOSYNTHESIS BI: CPT

## 2019-05-03 PROCEDURE — 77063 BREAST TOMOSYNTHESIS BI: CPT | Performed by: RADIOLOGY

## 2019-05-17 ENCOUNTER — TRANSCRIBE ORDERS (OUTPATIENT)
Dept: MAMMOGRAPHY | Facility: HOSPITAL | Age: 39
End: 2019-05-17

## 2019-05-17 ENCOUNTER — HOSPITAL ENCOUNTER (OUTPATIENT)
Dept: ULTRASOUND IMAGING | Facility: HOSPITAL | Age: 39
Discharge: HOME | End: 2019-05-17

## 2019-05-17 ENCOUNTER — HOSPITAL ENCOUNTER (OUTPATIENT)
Dept: MAMMOGRAPHY | Facility: HOSPITAL | Age: 39
Discharge: HOME | End: 2019-05-17
Admitting: FAMILY MEDICINE

## 2019-05-17 DIAGNOSIS — R92.8 ABNORMAL MAMMOGRAM: ICD-10-CM

## 2019-05-17 DIAGNOSIS — R92.8 ABNORMAL MAMMOGRAM: Primary | ICD-10-CM

## 2019-05-17 PROCEDURE — 76642 ULTRASOUND BREAST LIMITED: CPT | Performed by: RADIOLOGY

## 2019-05-17 PROCEDURE — G0279 TOMOSYNTHESIS, MAMMO: HCPCS

## 2019-05-17 PROCEDURE — 77065 DX MAMMO INCL CAD UNI: CPT

## 2019-05-17 PROCEDURE — 77061 BREAST TOMOSYNTHESIS UNI: CPT | Performed by: RADIOLOGY

## 2019-05-17 PROCEDURE — 77065 DX MAMMO INCL CAD UNI: CPT | Performed by: RADIOLOGY

## 2019-05-17 PROCEDURE — 76642 ULTRASOUND BREAST LIMITED: CPT

## 2019-05-29 ENCOUNTER — OFFICE VISIT (OUTPATIENT)
Dept: ENDOCRINOLOGY | Facility: CLINIC | Age: 39
End: 2019-05-29

## 2019-05-29 VITALS
HEART RATE: 100 BPM | SYSTOLIC BLOOD PRESSURE: 112 MMHG | HEIGHT: 66 IN | DIASTOLIC BLOOD PRESSURE: 86 MMHG | BODY MASS INDEX: 43.39 KG/M2 | WEIGHT: 270 LBS | OXYGEN SATURATION: 99 %

## 2019-05-29 DIAGNOSIS — C73 FOLLICULAR THYROID CANCER (HCC): ICD-10-CM

## 2019-05-29 DIAGNOSIS — E89.0 POSTOPERATIVE HYPOTHYROIDISM: Primary | ICD-10-CM

## 2019-05-29 LAB
T4 FREE SERPL-MCNC: 1.53 NG/DL (ref 0.93–1.7)
TSH SERPL DL<=0.05 MIU/L-ACNC: 0.48 MIU/ML (ref 0.27–4.2)

## 2019-05-29 PROCEDURE — 84439 ASSAY OF FREE THYROXINE: CPT | Performed by: INTERNAL MEDICINE

## 2019-05-29 PROCEDURE — 84432 ASSAY OF THYROGLOBULIN: CPT | Performed by: INTERNAL MEDICINE

## 2019-05-29 PROCEDURE — 84443 ASSAY THYROID STIM HORMONE: CPT | Performed by: INTERNAL MEDICINE

## 2019-05-29 PROCEDURE — 99213 OFFICE O/P EST LOW 20 MIN: CPT | Performed by: INTERNAL MEDICINE

## 2019-05-29 PROCEDURE — 86800 THYROGLOBULIN ANTIBODY: CPT

## 2019-05-29 RX ORDER — POTASSIUM CHLORIDE 1.5 G/1.77G
20 POWDER, FOR SOLUTION ORAL DAILY
COMMUNITY

## 2019-05-29 RX ORDER — FEXOFENADINE HCL 180 MG/1
180 TABLET ORAL DAILY
COMMUNITY

## 2019-05-31 LAB — REF LAB TEST RESULTS: NORMAL

## 2019-06-04 RX ORDER — NORETHINDRONE 0.35 MG/1
TABLET ORAL
Qty: 28 TABLET | Refills: 6 | Status: SHIPPED | OUTPATIENT
Start: 2019-06-04 | End: 2019-12-19

## 2019-07-06 RX ORDER — LOSARTAN POTASSIUM AND HYDROCHLOROTHIAZIDE 25; 100 MG/1; MG/1
TABLET ORAL
Qty: 30 TABLET | Refills: 0 | Status: SHIPPED | OUTPATIENT
Start: 2019-07-06 | End: 2019-09-13 | Stop reason: SDUPTHER

## 2019-07-09 RX ORDER — CITALOPRAM 20 MG/1
TABLET ORAL
Qty: 45 TABLET | Refills: 2 | Status: SHIPPED | OUTPATIENT
Start: 2019-07-09 | End: 2019-11-20 | Stop reason: SDUPTHER

## 2019-07-09 RX ORDER — FLUTICASONE PROPIONATE 50 MCG
SPRAY, SUSPENSION (ML) NASAL
Qty: 16 ML | Refills: 0 | Status: SHIPPED | OUTPATIENT
Start: 2019-07-09 | End: 2019-11-20 | Stop reason: SDUPTHER

## 2019-07-18 ENCOUNTER — OFFICE VISIT (OUTPATIENT)
Dept: FAMILY MEDICINE CLINIC | Facility: CLINIC | Age: 39
End: 2019-07-18

## 2019-07-18 VITALS
WEIGHT: 263.25 LBS | HEART RATE: 82 BPM | RESPIRATION RATE: 18 BRPM | HEIGHT: 66 IN | TEMPERATURE: 96.6 F | DIASTOLIC BLOOD PRESSURE: 70 MMHG | OXYGEN SATURATION: 98 % | BODY MASS INDEX: 42.31 KG/M2 | SYSTOLIC BLOOD PRESSURE: 120 MMHG

## 2019-07-18 DIAGNOSIS — K59.1 FUNCTIONAL DIARRHEA: Primary | ICD-10-CM

## 2019-07-18 LAB
ALBUMIN SERPL-MCNC: 4.8 G/DL (ref 3.5–5.2)
ALBUMIN/GLOB SERPL: 1.7 G/DL
ALP SERPL-CCNC: 92 U/L (ref 39–117)
ALT SERPL W P-5'-P-CCNC: 61 U/L (ref 1–33)
AMYLASE SERPL-CCNC: 43 U/L (ref 28–100)
ANION GAP SERPL CALCULATED.3IONS-SCNC: 16.3 MMOL/L (ref 5–15)
AST SERPL-CCNC: 31 U/L (ref 1–32)
BASOPHILS # BLD AUTO: 0.05 10*3/MM3 (ref 0–0.2)
BASOPHILS NFR BLD AUTO: 0.8 % (ref 0–1.5)
BILIRUB SERPL-MCNC: 0.9 MG/DL (ref 0.2–1.2)
BUN BLD-MCNC: 8 MG/DL (ref 6–20)
BUN/CREAT SERPL: 9.8 (ref 7–25)
C DIFF TOX GENS STL QL NAA+PROBE: NOT DETECTED
CALCIUM SPEC-SCNC: 9.7 MG/DL (ref 8.6–10.5)
CHLORIDE SERPL-SCNC: 102 MMOL/L (ref 98–107)
CO2 SERPL-SCNC: 21.7 MMOL/L (ref 22–29)
CREAT BLD-MCNC: 0.82 MG/DL (ref 0.57–1)
DEPRECATED RDW RBC AUTO: 50.5 FL (ref 37–54)
EOSINOPHIL # BLD AUTO: 0.08 10*3/MM3 (ref 0–0.4)
EOSINOPHIL NFR BLD AUTO: 1.3 % (ref 0.3–6.2)
ERYTHROCYTE [DISTWIDTH] IN BLOOD BY AUTOMATED COUNT: 15 % (ref 12.3–15.4)
GFR SERPL CREATININE-BSD FRML MDRD: 77 ML/MIN/1.73
GLOBULIN UR ELPH-MCNC: 2.8 GM/DL
GLUCOSE BLD-MCNC: 84 MG/DL (ref 65–99)
HAV IGM SERPL QL IA: NORMAL
HCT VFR BLD AUTO: 48.8 % (ref 34–46.6)
HGB BLD-MCNC: 16 G/DL (ref 12–15.9)
IMM GRANULOCYTES # BLD AUTO: 0.01 10*3/MM3 (ref 0–0.05)
IMM GRANULOCYTES NFR BLD AUTO: 0.2 % (ref 0–0.5)
LIPASE SERPL-CCNC: 24 U/L (ref 13–60)
LYMPHOCYTES # BLD AUTO: 1.59 10*3/MM3 (ref 0.7–3.1)
LYMPHOCYTES NFR BLD AUTO: 26.8 % (ref 19.6–45.3)
MCH RBC QN AUTO: 30 PG (ref 26.6–33)
MCHC RBC AUTO-ENTMCNC: 32.8 G/DL (ref 31.5–35.7)
MCV RBC AUTO: 91.6 FL (ref 79–97)
MONOCYTES # BLD AUTO: 0.39 10*3/MM3 (ref 0.1–0.9)
MONOCYTES NFR BLD AUTO: 6.6 % (ref 5–12)
NEUTROPHILS # BLD AUTO: 3.81 10*3/MM3 (ref 1.7–7)
NEUTROPHILS NFR BLD AUTO: 64.3 % (ref 42.7–76)
NRBC BLD AUTO-RTO: 0 /100 WBC (ref 0–0.2)
PLATELET # BLD AUTO: 332 10*3/MM3 (ref 140–450)
PMV BLD AUTO: 10.6 FL (ref 6–12)
POTASSIUM BLD-SCNC: 4.6 MMOL/L (ref 3.5–5.2)
PROT SERPL-MCNC: 7.6 G/DL (ref 6–8.5)
RBC # BLD AUTO: 5.33 10*6/MM3 (ref 3.77–5.28)
SODIUM BLD-SCNC: 140 MMOL/L (ref 136–145)
TSH SERPL DL<=0.05 MIU/L-ACNC: 7.98 MIU/ML (ref 0.27–4.2)
WBC NRBC COR # BLD: 5.93 10*3/MM3 (ref 3.4–10.8)

## 2019-07-18 PROCEDURE — 83516 IMMUNOASSAY NONANTIBODY: CPT | Performed by: NURSE PRACTITIONER

## 2019-07-18 PROCEDURE — 99213 OFFICE O/P EST LOW 20 MIN: CPT | Performed by: NURSE PRACTITIONER

## 2019-07-18 PROCEDURE — 82150 ASSAY OF AMYLASE: CPT | Performed by: NURSE PRACTITIONER

## 2019-07-18 PROCEDURE — 87209 SMEAR COMPLEX STAIN: CPT | Performed by: NURSE PRACTITIONER

## 2019-07-18 PROCEDURE — 36415 COLL VENOUS BLD VENIPUNCTURE: CPT | Performed by: NURSE PRACTITIONER

## 2019-07-18 PROCEDURE — 82784 ASSAY IGA/IGD/IGG/IGM EACH: CPT | Performed by: NURSE PRACTITIONER

## 2019-07-18 PROCEDURE — 80053 COMPREHEN METABOLIC PANEL: CPT | Performed by: NURSE PRACTITIONER

## 2019-07-18 PROCEDURE — 87046 STOOL CULTR AEROBIC BACT EA: CPT | Performed by: NURSE PRACTITIONER

## 2019-07-18 PROCEDURE — 84443 ASSAY THYROID STIM HORMONE: CPT | Performed by: NURSE PRACTITIONER

## 2019-07-18 PROCEDURE — 86255 FLUORESCENT ANTIBODY SCREEN: CPT | Performed by: NURSE PRACTITIONER

## 2019-07-18 PROCEDURE — 83690 ASSAY OF LIPASE: CPT | Performed by: NURSE PRACTITIONER

## 2019-07-18 PROCEDURE — 87045 FECES CULTURE AEROBIC BACT: CPT | Performed by: NURSE PRACTITIONER

## 2019-07-18 PROCEDURE — 87493 C DIFF AMPLIFIED PROBE: CPT | Performed by: NURSE PRACTITIONER

## 2019-07-18 PROCEDURE — 87177 OVA AND PARASITES SMEARS: CPT | Performed by: NURSE PRACTITIONER

## 2019-07-18 PROCEDURE — 85025 COMPLETE CBC W/AUTO DIFF WBC: CPT | Performed by: NURSE PRACTITIONER

## 2019-07-18 PROCEDURE — 86709 HEPATITIS A IGM ANTIBODY: CPT | Performed by: NURSE PRACTITIONER

## 2019-07-18 NOTE — PROGRESS NOTES
Subjective   Laura Weathers is a 39 y.o. female.     History of Present Illness Complains of diarrhea off and on for 3 weeks. Initially thought she had a virus.  was ill too with fatigue, malaise, diarrhea that resolved over 5 days.  Symptoms of liquid brown stool, 6-10 episodes a day returned a week later. No fever, melena or hematochezia. No real abd pain except for generalized cramping before a stool and relieved after a stool. Had normal, brown, formed stools between episodes of diarrhea.  This week the symptoms returned of frequent 6-10 liquid brown stools a day.  She is teating it with Immodium which causes worsening symptoms of abdominal cramps.    Thinks she has a dairy allergy but has never been tested. No travel.  Did have a round of Flagyl first week of July for bacterial vaginosis. Has not had a colonoscopy. History of thyroid cancer.    Outpatient Encounter Medications as of 7/18/2019   Medication Sig Dispense Refill   • citalopram (CeleXA) 20 MG tablet TAKE 1 & 1/2 TABLET BY MOUTH EVERY DAY (Patient taking differently: TAKE 1  TABLET BY MOUTH EVERY DAY) 45 tablet 2   • Cyanocobalamin (VITAMIN B-12 PO) Take  by mouth.     • MARJAN 0.35 MG tablet TAKE 1 TABLET BY MOUTH EVERY DAY 28 tablet 6   • fexofenadine (ALLEGRA) 180 MG tablet Take 180 mg by mouth Daily.     • fluticasone (FLONASE) 50 MCG/ACT nasal spray USE 1 SPRAY IN EACH NOSTRIL EVERY DAY 16 mL 0   • levothyroxine (SYNTHROID, LEVOTHROID) 150 MCG tablet Take one tablet in AM daily mon through Saturday. Take 2 tablets on Sunday. Total 8 tablets per week. 35 tablet 11   • losartan-hydrochlorothiazide (HYZAAR) 100-25 MG per tablet TAKE 1/2 TABLET BY MOUTH EVERY DAY 30 tablet 0   • potassium chloride (KLOR-CON) 20 MEQ packet Take 20 mEq by mouth Daily.     • [DISCONTINUED] losartan-hydrochlorothiazide (HYZAAR) 50-12.5 MG per tablet TAKE 1 TABLET BY MOUTH EVERY DAY 30 tablet 2     No facility-administered encounter medications on file as of  "7/18/2019.        The following portions of the patient's history were reviewed and updated as appropriate: allergies, current medications, past family history, past medical history, past social history, past surgical history and problem list.    Review of Systems   Constitutional: Negative for appetite change, fever, unexpected weight gain and unexpected weight loss.   HENT: Negative for congestion, nosebleeds, sore throat and trouble swallowing.    Eyes: Negative for visual disturbance.   Respiratory: Negative for cough, shortness of breath and wheezing.    Cardiovascular: Negative for chest pain, palpitations and leg swelling.   Gastrointestinal: Positive for diarrhea. Negative for abdominal pain, blood in stool, constipation, nausea and vomiting.   Endocrine: Negative for polydipsia, polyphagia and polyuria.   Genitourinary: Negative for dysuria, frequency and hematuria.   Musculoskeletal: Negative for arthralgias, joint swelling and myalgias.   Skin: Negative for rash.   Neurological: Negative for dizziness, seizures, syncope and numbness.   Hematological: Negative for adenopathy. Does not bruise/bleed easily.   Psychiatric/Behavioral: Negative for behavioral problems, sleep disturbance and depressed mood. The patient is not nervous/anxious.        Objective     Visit Vitals  /70 (BP Location: Left arm, Patient Position: Sitting)   Pulse 82   Temp 96.6 °F (35.9 °C) (Temporal)   Resp 18   Ht 167.6 cm (66\")   Wt 119 kg (263 lb 4 oz)   SpO2 98%   BMI 42.49 kg/m²       Physical Exam   Constitutional: She is oriented to person, place, and time. She appears well-developed and well-nourished. No distress.   HENT:   Head: Normocephalic and atraumatic.   Right Ear: Tympanic membrane and external ear normal.   Left Ear: Tympanic membrane and external ear normal.   Nose: Nose normal.   Mouth/Throat: Oropharynx is clear and moist. No oropharyngeal exudate.   Eyes: Conjunctivae are normal. Pupils are equal, round, and " reactive to light. Right eye exhibits no discharge. Left eye exhibits no discharge. No scleral icterus.   Neck: Neck supple. No tracheal deviation present. No thyromegaly present.   Cardiovascular: Normal rate, regular rhythm and normal heart sounds. Exam reveals no gallop and no friction rub.   No murmur heard.  Pulmonary/Chest: Effort normal and breath sounds normal. No respiratory distress. She has no wheezes.   Abdominal: Soft. Bowel sounds are normal. She exhibits no distension and no mass. There is no tenderness.   Musculoskeletal: She exhibits no edema or deformity.   Lymphadenopathy:     She has no cervical adenopathy.   Neurological: She is alert and oriented to person, place, and time. Coordination normal.   Skin: Skin is warm and dry. Capillary refill takes less than 2 seconds. No rash noted. No erythema.   Psychiatric: She has a normal mood and affect. Her speech is normal and behavior is normal. Judgment and thought content normal.   Nursing note and vitals reviewed.        Assessment/Plan   Laura was seen today for diarrhea.    Diagnoses and all orders for this visit:    Functional diarrhea  -     TSH  -     Amylase  -     Lipase  -     Hepatitis A Antibody, IgM  -     CBC & Differential  -     Comprehensive Metabolic Panel  -     Celiac Comprehensive Panel  -     CBC Auto Differential    Eliminate dairy from diet.  Avoid Immodium.  Stay hydrated.  Obtain labs and stool studies.  Discussed the nature of the disease including, risks, complications, implications, management, safe and proper use of medications. Encouraged therapeutic lifestyle changes including low calorie diet with plenty of fruits and vegetables, daily exercise, medication compliance, and keeping scheduled follow up appointments with me and any other providers. Encouraged patient to have appointment for complete physical, fasting labs, appropriate screenings, and immunizations on an annual basis.  Follow up after tests.

## 2019-07-19 ENCOUNTER — TELEPHONE (OUTPATIENT)
Dept: INTERNAL MEDICINE | Facility: CLINIC | Age: 39
End: 2019-07-19

## 2019-07-19 ENCOUNTER — TELEPHONE (OUTPATIENT)
Dept: FAMILY MEDICINE CLINIC | Facility: CLINIC | Age: 39
End: 2019-07-19

## 2019-07-19 LAB
ENDOMYSIUM IGA SER QL: NEGATIVE
GLIADIN PEPTIDE IGA SER-ACNC: 3 UNITS (ref 0–19)
GLIADIN PEPTIDE IGG SER-ACNC: 2 UNITS (ref 0–19)
IGA SERPL-MCNC: 152 MG/DL (ref 87–352)
TTG IGA SER-ACNC: <2 U/ML (ref 0–3)
TTG IGG SER-ACNC: 8 U/ML (ref 0–5)

## 2019-07-19 NOTE — TELEPHONE ENCOUNTER
PT HAD LABS DONE YESTERDAY WITH HER PCP  WHO IS WITH [facility] THEY TOLD HER TO CALL YOU THAT HER TSH WAS ABNORMAL     PLEASE CALL [phone]

## 2019-07-19 NOTE — TELEPHONE ENCOUNTER
Discussed labs that have returned. Stool studies are still pending. TSH 7.98. She will call endo and let them know.

## 2019-07-21 NOTE — TELEPHONE ENCOUNTER
I spoke with Ms. Weathers about her TSH elevation. She has always had a normal TSH on her current dose, and two months ago her TSH was WNL. I think her current diarrhea is affecting the absorption of her levothyroxine leading to the elevated TSH. In addition, she has been keeping her levothyroxine in her purse which may have compromised the tablets. I explained to Laura that her post-surgical hypothyroidism is not the cause of her diarrhea and she does not need a change in her dose which has been stable for a while now.  Valeria Lara MD

## 2019-07-23 LAB
CAMPYLOBACTER STL CULT: NORMAL
E COLI SXT STL QL IA: NEGATIVE
Lab: NORMAL
Lab: NORMAL
SALM + SHIG STL CULT: NORMAL

## 2019-07-24 LAB
O+P SPEC MICRO: NORMAL
O+P STL TRI STN: NORMAL

## 2019-08-02 ENCOUNTER — TELEPHONE (OUTPATIENT)
Dept: FAMILY MEDICINE CLINIC | Facility: CLINIC | Age: 39
End: 2019-08-02

## 2019-08-02 DIAGNOSIS — K59.1 FUNCTIONAL DIARRHEA: Primary | ICD-10-CM

## 2019-08-02 NOTE — TELEPHONE ENCOUNTER
Still with diarrhea and abd cramps. Labs negative and stool studies negative. Will try  Xifaxan and refer to GI and allergist.

## 2019-08-02 NOTE — TELEPHONE ENCOUNTER
----- Message from Molly Richards sent at 8/2/2019 10:30 AM EDT -----  Regarding: DIGESTIVE ISSUES  Contact: [phone]  Patient said she saw Nico a couple weeks ago and was told to let her know how she was doing, so she would like for Nico to call her today, if possible.  Please call @ [phone].  Thank you.

## 2019-08-05 ENCOUNTER — TELEPHONE (OUTPATIENT)
Dept: FAMILY MEDICINE CLINIC | Facility: CLINIC | Age: 39
End: 2019-08-05

## 2019-08-05 NOTE — TELEPHONE ENCOUNTER
----- Message from Jeffrey Wynn DNP, APRN sent at 8/5/2019  2:19 PM EDT -----  Regarding: RE: DURATION TO TAKE MED  Contact: [phone]  It is for 14 days only, then stop  ----- Message -----  From: Regi Cameron MA  Sent: 8/5/2019   1:49 PM  To: Jeffrey Wynn DNP, APRN  Subject: FW: DURATION TO TAKE MED                         Please advise on xifaxan    ----- Message -----  From: Shirley Hsieh RegSched Rep  Sent: 8/5/2019   1:41 PM  To: Regi Cameron MA  Subject: DURATION TO TAKE MED                             PT NEEDS KNOW HOW LONG TO TAKE THE MEDICATION PRESCRIBED PLEASE GIVE HER A CALL

## 2019-08-30 ENCOUNTER — OFFICE VISIT (OUTPATIENT)
Dept: GASTROENTEROLOGY | Facility: CLINIC | Age: 39
End: 2019-08-30

## 2019-08-30 VITALS
SYSTOLIC BLOOD PRESSURE: 118 MMHG | OXYGEN SATURATION: 99 % | WEIGHT: 265.2 LBS | HEART RATE: 80 BPM | HEIGHT: 66 IN | BODY MASS INDEX: 42.62 KG/M2 | TEMPERATURE: 97 F | DIASTOLIC BLOOD PRESSURE: 90 MMHG

## 2019-08-30 DIAGNOSIS — Z85.850 HISTORY OF THYROID CANCER: ICD-10-CM

## 2019-08-30 DIAGNOSIS — R10.13 DYSPEPSIA: ICD-10-CM

## 2019-08-30 DIAGNOSIS — K52.9 CHRONIC DIARRHEA: Primary | ICD-10-CM

## 2019-08-30 DIAGNOSIS — E66.01 OBESITY, CLASS III, BMI 40-49.9 (MORBID OBESITY) (HCC): ICD-10-CM

## 2019-08-30 PROCEDURE — 99214 OFFICE O/P EST MOD 30 MIN: CPT | Performed by: NURSE PRACTITIONER

## 2019-09-16 RX ORDER — LOSARTAN POTASSIUM AND HYDROCHLOROTHIAZIDE 25; 100 MG/1; MG/1
TABLET ORAL
Qty: 15 TABLET | Refills: 1 | Status: SHIPPED | OUTPATIENT
Start: 2019-09-16 | End: 2020-01-20 | Stop reason: SDUPTHER

## 2019-11-18 ENCOUNTER — TRANSCRIBE ORDERS (OUTPATIENT)
Dept: MAMMOGRAPHY | Facility: HOSPITAL | Age: 39
End: 2019-11-18

## 2019-11-18 ENCOUNTER — HOSPITAL ENCOUNTER (OUTPATIENT)
Dept: MAMMOGRAPHY | Facility: HOSPITAL | Age: 39
Discharge: HOME | End: 2019-11-18
Admitting: FAMILY MEDICINE

## 2019-11-18 ENCOUNTER — HOSPITAL ENCOUNTER (OUTPATIENT)
Dept: ULTRASOUND IMAGING | Facility: HOSPITAL | Age: 39
Discharge: HOME | End: 2019-11-18

## 2019-11-18 ENCOUNTER — OFFICE VISIT (OUTPATIENT)
Dept: ENDOCRINOLOGY | Facility: CLINIC | Age: 39
End: 2019-11-18

## 2019-11-18 VITALS
DIASTOLIC BLOOD PRESSURE: 84 MMHG | OXYGEN SATURATION: 98 % | SYSTOLIC BLOOD PRESSURE: 124 MMHG | BODY MASS INDEX: 42.29 KG/M2 | HEART RATE: 78 BPM | WEIGHT: 262 LBS

## 2019-11-18 DIAGNOSIS — C73 FOLLICULAR THYROID CANCER (HCC): ICD-10-CM

## 2019-11-18 DIAGNOSIS — E89.0 POSTOPERATIVE HYPOTHYROIDISM: Primary | ICD-10-CM

## 2019-11-18 DIAGNOSIS — Z00.00 PREVENTATIVE HEALTH CARE: ICD-10-CM

## 2019-11-18 DIAGNOSIS — R92.8 ABNORMAL MAMMOGRAM: ICD-10-CM

## 2019-11-18 DIAGNOSIS — R92.8 ABNORMAL MAMMOGRAM: Primary | ICD-10-CM

## 2019-11-18 PROCEDURE — 77061 BREAST TOMOSYNTHESIS UNI: CPT | Performed by: RADIOLOGY

## 2019-11-18 PROCEDURE — G0279 TOMOSYNTHESIS, MAMMO: HCPCS

## 2019-11-18 PROCEDURE — 99213 OFFICE O/P EST LOW 20 MIN: CPT | Performed by: INTERNAL MEDICINE

## 2019-11-18 PROCEDURE — 84443 ASSAY THYROID STIM HORMONE: CPT | Performed by: INTERNAL MEDICINE

## 2019-11-18 PROCEDURE — 90686 IIV4 VACC NO PRSV 0.5 ML IM: CPT | Performed by: INTERNAL MEDICINE

## 2019-11-18 PROCEDURE — 77065 DX MAMMO INCL CAD UNI: CPT | Performed by: RADIOLOGY

## 2019-11-18 PROCEDURE — 76642 ULTRASOUND BREAST LIMITED: CPT

## 2019-11-18 PROCEDURE — 77065 DX MAMMO INCL CAD UNI: CPT

## 2019-11-18 PROCEDURE — 76642 ULTRASOUND BREAST LIMITED: CPT | Performed by: RADIOLOGY

## 2019-11-18 PROCEDURE — 90471 IMMUNIZATION ADMIN: CPT | Performed by: INTERNAL MEDICINE

## 2019-11-18 NOTE — PROGRESS NOTES
Chief Complaint   Patient presents with   • Hypothyroidism     f/u   • Thyroid Cancer     f/u       HPI:   Laura Weathers is a 39 y.o.female who returns to [facility]ocrine Clinic for f/u evaluation of her hypothyroidism and h/o follicular thyroid cancer. Last visit 05/29/2019. Her history is as follows:    Interim Events:   - pt with gastroenteritis in 07/2019. TSH at that time was slightly elevated at 7.980  - Pt had sinus surgery at [facility] in 10/14/2019    1) T3N0M0 follicular thyroid carcinoma:  - found to have a palpable left lobe nodule/goiter on physical exam during pregnancy in 2013. Post partum she had FNA of a 1.5 cm left lobe nodule by Dr. Phoenix Hanna. Cytology showed follicular neoplasm and excision was recommended.  - pt underwent a two stage thyroidectomy in 06/26/2013 and 07/01/2013    - (06/26/2013) s/p left thyroid lobectomy and isthmusectomy at [facility]   Surgical Pathology: T3N0Mx Follicular carcinoma  - Left lobe tumor, 1.8 cm in greatest dimension.  * Tumor capsule present  *Tumor capsule invasion present  *Lymph-vascular invasion present  *Focal extrathyroidal extension identified.  *Focal angioinvasion by neoplasm identified.  *Margins free of neoplasm.  * (0/4) Four peripheral lymph nodes negative for metastatic carcinoma.     - (07/01/2013) s/p completion right lobectomy.  Surgical Pathology: variably sized colloid filled follicles without atypia or tumor involvement. No parathyroid glands or lymph nodes are seen .     Postoperative Course:  - (07/24/2013): Received 105.3 mCi of I-131 at [facility] for remnant ablation ordered by Dr. Phoenix Hanna. Pt was off thyroid hormone and prepped with 1-2 weeks of a low iodine diet. TSH was 75, TG was not collected prior to treatment  - (08/13/2014) Thyrogen Stimulated I-131 WBS ([facility]) - negative scan, stimulated TG was 1.9  - (12/09/2015)  In office neck US by Dr. Hanna was negative  - (08/15/2016) Thyrogen  Stimulated I-131 WBS ([facility]) - negative scan, stimulated TG was 1.7    LAB Summary:  (05/29/2019) TG 0.3, TG Abs <1.0 ([facility]), TSH 0.478 (0.270 - 4.200), FT4 1.53 (0.93 - 1.70)  (11/28/2019) TG 0.3, TG Abs <1.0 ([facility]), TSH 0.038 (0.350 - 5.350), FT4 1.49 (0.89 - 1.76)  (08/17/2016) stimulated TG 1.7  (12/08/2015) TG 0.4, TSH 0.13  (05/09/2015) TG 0.3, TSH 0.02  (08/13/2014) stimulated TG 1.9    Recent Imaging:   (05/2018) Neck US in clinic - negative     2) postoperative hypothyroidism:  - was on suppressive thyroid hormone therapy with levothyroxine 175 mcg daily but pt could not tolerate. Alternating doses of 175 and 150 which is approx 164 mcg daily kept her TSH at 0.477,  Last dose change 11/2017  Current Dose: Levothyroxine 150 mcg daily M-Saturday, 2 tabs on Sunday which approximates 171 mcg daily.  Tolerating this regimen    Review of Systems   Constitutional: Negative.    HENT: Negative.    Eyes: Negative.  Negative for itching.   Respiratory: Negative.    Cardiovascular: Negative.  Negative for palpitations.   Gastrointestinal: Negative.    Endocrine: Negative.    Genitourinary: Negative.    Musculoskeletal: Negative.    Skin: Negative.    Allergic/Immunologic: Negative.    Neurological: Negative.    Hematological: Negative.    Psychiatric/Behavioral: Negative.      The following portions of the patient's history were reviewed and updated as appropriate: allergies, current medications, past family history, past medical history, past social history, past surgical history and problem list.    /84   Pulse 78   Wt 119 kg (262 lb)   LMP 11/13/2019   SpO2 98%   BMI 42.29 kg/m²   Physical Exam   Constitutional: She is oriented to person, place, and time. She appears well-developed. No distress.   obese   HENT:   Head: Normocephalic.   Mouth/Throat: Oropharynx is clear and moist.   Eyes: Conjunctivae and EOM are normal. Pupils are equal, round, and reactive to light.   Neck: No tracheal deviation  present.   No palpable thyroid tissue     Cardiovascular: Normal rate, regular rhythm and normal heart sounds.   No murmur heard.  Pulmonary/Chest: Effort normal and breath sounds normal. No respiratory distress.   Lymphadenopathy:     She has no cervical adenopathy.   Neurological: She is alert and oriented to person, place, and time. No cranial nerve deficit.   Skin: Skin is warm and dry. She is not diaphoretic. No erythema.   Psychiatric: She has a normal mood and affect. Her behavior is normal.   Vitals reviewed.    LABS/IMAGING: outside records reviewed and summarized in [facility]  Office Visit on 11/18/2019   Component Date Value Ref Range Status   • TSH 11/18/2019 3.980  0.270 - 4.200 uIU/mL Final     ASSESSMENT/PLAN:  1) T3N0M0 follicular carcinoma: no evidence of disease recurrence at this time    - diagnosed 06/23/2013, s/p two stage thyroidectomy, and 105.3 mCi of I-131 for remnant ablation by her former endocrinologist   - have discussed the current American Thyroid Association current guidelines for differentiated thyroid cancer management.  - using the REUBEN risk stratification system to estimate risk of persistent/recurrent disease, Ms. Weathers is in the intermediate risk category for persistent/recurrent disease    - per review of her records, pt has had an indeterminate response to treatment. Specifically, she has had stimulated TG levels of 1.7 and 1.9 in 2016 and 2014 respectively. Excellent response is considered when nonstimulated Tg level <0.2 ng/mL or stimulated Tg level <1 ng/mL and undetectable TgAb and negative imaging.  - Neck US performed 05/2018 showed no abnormal lymph nodes.    - discussed with patient that it is reassuring that her TG has remained stable at 0.3 for many years on her current dosing of levothyroxine.    The overall plan will be to monitor for recurrent disease with TG levels at [facility] every 6 -12 months. Neck US to be performed as indicated. Her goal TSH will be between 0.1 - 0.5  as tolerated    2) postoperative hypothyroidism:  - was on suppressive thyroid hormone therapy with levothyroxine 175 mcg daily but pt could not tolerate.  - TSH was not at goal today, however, this may be due to recent illness/surgery. TSH had been at goal on multiple occasions on the same dose.  - For now, will continue 150 mcg daily M-Saturday, 2 tabs on Sunday which approximates 171 mcg daily.   - Will have pt check TFTs in 3 months. IF TSH not at goal at that time, will adjust dose    RTC 12 months    ADDENDUM: pt called 11/23/2019 to notify me she would like to try a higher dose of levothyroxine. Has been having more fatigue. Increased dose to 175 mcg daily. Pt to check TFT's in 02/2019. Will adjust dose as indicated.

## 2019-11-19 ENCOUNTER — TELEPHONE (OUTPATIENT)
Dept: ENDOCRINOLOGY | Facility: CLINIC | Age: 39
End: 2019-11-19

## 2019-11-19 LAB — TSH SERPL DL<=0.05 MIU/L-ACNC: 3.98 UIU/ML (ref 0.27–4.2)

## 2019-11-19 RX ORDER — LEVOTHYROXINE SODIUM 0.15 MG/1
TABLET ORAL
Qty: 35 TABLET | Refills: 11 | Status: SHIPPED | OUTPATIENT
Start: 2019-11-19 | End: 2019-11-23 | Stop reason: DRUGHIGH

## 2019-11-19 NOTE — TELEPHONE ENCOUNTER
Spoke to patient about lab results. See clinic note from 11/18/2019 for details.   Valeria Lara MD

## 2019-11-20 RX ORDER — CITALOPRAM 20 MG/1
TABLET ORAL
Qty: 45 TABLET | Refills: 0 | Status: SHIPPED | OUTPATIENT
Start: 2019-11-20 | End: 2019-12-19

## 2019-11-20 RX ORDER — FLUTICASONE PROPIONATE 50 MCG
SPRAY, SUSPENSION (ML) NASAL
Qty: 16 ML | Refills: 0 | Status: SHIPPED | OUTPATIENT
Start: 2019-11-20 | End: 2019-12-19

## 2019-11-20 RX ORDER — LOSARTAN POTASSIUM AND HYDROCHLOROTHIAZIDE 12.5; 5 MG/1; MG/1
TABLET ORAL
Qty: 30 TABLET | Refills: 0 | Status: SHIPPED | OUTPATIENT
Start: 2019-11-20 | End: 2019-12-19

## 2019-11-22 ENCOUNTER — TELEPHONE (OUTPATIENT)
Dept: INTERNAL MEDICINE | Facility: CLINIC | Age: 39
End: 2019-11-22

## 2019-11-23 DIAGNOSIS — E89.0 POSTOPERATIVE HYPOTHYROIDISM: Primary | ICD-10-CM

## 2019-11-23 RX ORDER — LEVOTHYROXINE SODIUM 175 UG/1
175 TABLET ORAL DAILY
Qty: 30 TABLET | Refills: 11 | Status: SHIPPED | OUTPATIENT
Start: 2019-11-23 | End: 2020-03-20 | Stop reason: DRUGHIGH

## 2019-12-19 RX ORDER — LOSARTAN POTASSIUM AND HYDROCHLOROTHIAZIDE 12.5; 5 MG/1; MG/1
TABLET ORAL
Qty: 30 TABLET | Refills: 0 | Status: SHIPPED | OUTPATIENT
Start: 2019-12-19 | End: 2020-01-20

## 2019-12-19 RX ORDER — FLUTICASONE PROPIONATE 50 MCG
SPRAY, SUSPENSION (ML) NASAL
Qty: 16 ML | Refills: 0 | Status: SHIPPED | OUTPATIENT
Start: 2019-12-19 | End: 2020-02-27 | Stop reason: SDUPTHER

## 2019-12-19 RX ORDER — CITALOPRAM 20 MG/1
TABLET ORAL
Qty: 45 TABLET | Refills: 0 | Status: SHIPPED | OUTPATIENT
Start: 2019-12-19 | End: 2020-02-27 | Stop reason: SDUPTHER

## 2019-12-19 RX ORDER — NORETHINDRONE 0.35 MG/1
TABLET ORAL
Qty: 28 TABLET | Refills: 0 | Status: SHIPPED | OUTPATIENT
Start: 2019-12-19 | End: 2020-01-20 | Stop reason: SDUPTHER

## 2020-01-20 ENCOUNTER — TELEPHONE (OUTPATIENT)
Dept: FAMILY MEDICINE CLINIC | Facility: CLINIC | Age: 40
End: 2020-01-20

## 2020-01-20 RX ORDER — LOSARTAN POTASSIUM AND HYDROCHLOROTHIAZIDE 12.5; 5 MG/1; MG/1
TABLET ORAL
Qty: 30 TABLET | Refills: 0 | Status: SHIPPED | OUTPATIENT
Start: 2020-01-20 | End: 2020-02-13 | Stop reason: SDUPTHER

## 2020-01-20 RX ORDER — ACETAMINOPHEN AND CODEINE PHOSPHATE 120; 12 MG/5ML; MG/5ML
1 SOLUTION ORAL DAILY
Qty: 28 TABLET | Refills: 0 | Status: SHIPPED | OUTPATIENT
Start: 2020-01-20 | End: 2020-02-27

## 2020-01-20 RX ORDER — LOSARTAN POTASSIUM AND HYDROCHLOROTHIAZIDE 25; 100 MG/1; MG/1
0.5 TABLET ORAL DAILY
Qty: 15 TABLET | Refills: 0 | Status: SHIPPED | OUTPATIENT
Start: 2020-01-20 | End: 2020-02-27

## 2020-01-20 RX ORDER — NORETHINDRONE 0.35 MG/1
TABLET ORAL
Qty: 28 TABLET | Refills: 0 | Status: SHIPPED | OUTPATIENT
Start: 2020-01-20 | End: 2020-02-27 | Stop reason: SDUPTHER

## 2020-01-20 NOTE — TELEPHONE ENCOUNTER
Patient needs a follow-up appointment.  It has been  one year.  Does she need 30-day supply until she can be seen

## 2020-01-20 NOTE — TELEPHONE ENCOUNTER
Patient called and stated that she is out of the following medications and needs them to be called in and authorized per [facility].     losartan-hydrochlorothiazide (HYZAAR) 100-25 MG per tablet    MARJAN 0.35 MG tablet    Patient can be contacted at [phone].

## 2020-02-13 ENCOUNTER — TELEPHONE (OUTPATIENT)
Dept: FAMILY MEDICINE CLINIC | Facility: CLINIC | Age: 40
End: 2020-02-13

## 2020-02-13 DIAGNOSIS — I10 HYPERTENSION, UNSPECIFIED TYPE: Primary | ICD-10-CM

## 2020-02-13 RX ORDER — LOSARTAN POTASSIUM AND HYDROCHLOROTHIAZIDE 12.5; 5 MG/1; MG/1
1 TABLET ORAL DAILY
Qty: 30 TABLET | Refills: 0 | Status: SHIPPED | OUTPATIENT
Start: 2020-02-13 | End: 2020-02-27 | Stop reason: SDUPTHER

## 2020-02-21 RX ORDER — ACETAMINOPHEN AND CODEINE PHOSPHATE 120; 12 MG/5ML; MG/5ML
1 SOLUTION ORAL DAILY
Qty: 28 TABLET | Refills: 0 | OUTPATIENT
Start: 2020-02-21

## 2020-02-27 ENCOUNTER — OFFICE VISIT (OUTPATIENT)
Dept: FAMILY MEDICINE CLINIC | Facility: CLINIC | Age: 40
End: 2020-02-27

## 2020-02-27 VITALS
DIASTOLIC BLOOD PRESSURE: 96 MMHG | SYSTOLIC BLOOD PRESSURE: 130 MMHG | OXYGEN SATURATION: 97 % | HEIGHT: 66 IN | BODY MASS INDEX: 42.59 KG/M2 | RESPIRATION RATE: 18 BRPM | WEIGHT: 265 LBS | HEART RATE: 114 BPM | TEMPERATURE: 97.7 F

## 2020-02-27 DIAGNOSIS — I10 ESSENTIAL HYPERTENSION: Primary | ICD-10-CM

## 2020-02-27 DIAGNOSIS — F51.01 PRIMARY INSOMNIA: ICD-10-CM

## 2020-02-27 DIAGNOSIS — E03.9 ACQUIRED HYPOTHYROIDISM: ICD-10-CM

## 2020-02-27 DIAGNOSIS — F33.0 MILD EPISODE OF RECURRENT DEPRESSIVE DISORDER (HCC): ICD-10-CM

## 2020-02-27 LAB
ALBUMIN SERPL-MCNC: 4.8 G/DL (ref 3.5–5.2)
ALBUMIN/GLOB SERPL: 1.9 G/DL
ALP SERPL-CCNC: 88 U/L (ref 39–117)
ALT SERPL W P-5'-P-CCNC: 49 U/L (ref 1–33)
ANION GAP SERPL CALCULATED.3IONS-SCNC: 14.6 MMOL/L (ref 5–15)
AST SERPL-CCNC: 33 U/L (ref 1–32)
BASOPHILS # BLD AUTO: 0.04 10*3/MM3 (ref 0–0.2)
BASOPHILS NFR BLD AUTO: 0.7 % (ref 0–1.5)
BILIRUB SERPL-MCNC: 0.6 MG/DL (ref 0.2–1.2)
BUN BLD-MCNC: 10 MG/DL (ref 6–20)
BUN/CREAT SERPL: 12.3 (ref 7–25)
CALCIUM SPEC-SCNC: 9.6 MG/DL (ref 8.6–10.5)
CHLORIDE SERPL-SCNC: 101 MMOL/L (ref 98–107)
CHOLEST SERPL-MCNC: 202 MG/DL (ref 0–200)
CO2 SERPL-SCNC: 25.4 MMOL/L (ref 22–29)
CREAT BLD-MCNC: 0.81 MG/DL (ref 0.57–1)
DEPRECATED RDW RBC AUTO: 39.7 FL (ref 37–54)
EOSINOPHIL # BLD AUTO: 0.09 10*3/MM3 (ref 0–0.4)
EOSINOPHIL NFR BLD AUTO: 1.5 % (ref 0.3–6.2)
ERYTHROCYTE [DISTWIDTH] IN BLOOD BY AUTOMATED COUNT: 13.1 % (ref 12.3–15.4)
GFR SERPL CREATININE-BSD FRML MDRD: 78 ML/MIN/1.73
GLOBULIN UR ELPH-MCNC: 2.5 GM/DL
GLUCOSE BLD-MCNC: 84 MG/DL (ref 65–99)
HCT VFR BLD AUTO: 42.7 % (ref 34–46.6)
HDLC SERPL-MCNC: 31 MG/DL (ref 40–60)
HGB BLD-MCNC: 14.5 G/DL (ref 12–15.9)
IMM GRANULOCYTES # BLD AUTO: 0.02 10*3/MM3 (ref 0–0.05)
IMM GRANULOCYTES NFR BLD AUTO: 0.3 % (ref 0–0.5)
LDLC SERPL CALC-MCNC: 142 MG/DL (ref 0–100)
LDLC/HDLC SERPL: 4.57 {RATIO}
LYMPHOCYTES # BLD AUTO: 1.69 10*3/MM3 (ref 0.7–3.1)
LYMPHOCYTES NFR BLD AUTO: 27.9 % (ref 19.6–45.3)
MCH RBC QN AUTO: 28.4 PG (ref 26.6–33)
MCHC RBC AUTO-ENTMCNC: 34 G/DL (ref 31.5–35.7)
MCV RBC AUTO: 83.7 FL (ref 79–97)
MONOCYTES # BLD AUTO: 0.32 10*3/MM3 (ref 0.1–0.9)
MONOCYTES NFR BLD AUTO: 5.3 % (ref 5–12)
NEUTROPHILS # BLD AUTO: 3.89 10*3/MM3 (ref 1.7–7)
NEUTROPHILS NFR BLD AUTO: 64.3 % (ref 42.7–76)
NRBC BLD AUTO-RTO: 0 /100 WBC (ref 0–0.2)
PLATELET # BLD AUTO: 293 10*3/MM3 (ref 140–450)
PMV BLD AUTO: 10.3 FL (ref 6–12)
POTASSIUM BLD-SCNC: 4.2 MMOL/L (ref 3.5–5.2)
PROT SERPL-MCNC: 7.3 G/DL (ref 6–8.5)
RBC # BLD AUTO: 5.1 10*6/MM3 (ref 3.77–5.28)
SODIUM BLD-SCNC: 141 MMOL/L (ref 136–145)
TRIGL SERPL-MCNC: 146 MG/DL (ref 0–150)
TSH SERPL DL<=0.05 MIU/L-ACNC: 4.66 UIU/ML (ref 0.27–4.2)
VLDLC SERPL-MCNC: 29.2 MG/DL (ref 5–40)
WBC NRBC COR # BLD: 6.05 10*3/MM3 (ref 3.4–10.8)

## 2020-02-27 PROCEDURE — 84443 ASSAY THYROID STIM HORMONE: CPT | Performed by: FAMILY MEDICINE

## 2020-02-27 PROCEDURE — 36415 COLL VENOUS BLD VENIPUNCTURE: CPT | Performed by: FAMILY MEDICINE

## 2020-02-27 PROCEDURE — 80053 COMPREHEN METABOLIC PANEL: CPT | Performed by: FAMILY MEDICINE

## 2020-02-27 PROCEDURE — 99214 OFFICE O/P EST MOD 30 MIN: CPT | Performed by: FAMILY MEDICINE

## 2020-02-27 PROCEDURE — 80061 LIPID PANEL: CPT | Performed by: FAMILY MEDICINE

## 2020-02-27 PROCEDURE — 85025 COMPLETE CBC W/AUTO DIFF WBC: CPT | Performed by: FAMILY MEDICINE

## 2020-02-27 RX ORDER — METHYLPREDNISOLONE 4 MG/1
TABLET ORAL
COMMUNITY
Start: 2020-02-26 | End: 2020-02-27

## 2020-02-27 RX ORDER — LOSARTAN POTASSIUM AND HYDROCHLOROTHIAZIDE 12.5; 5 MG/1; MG/1
1 TABLET ORAL DAILY
Qty: 30 TABLET | Refills: 5 | Status: SHIPPED | OUTPATIENT
Start: 2020-02-27 | End: 2020-09-16

## 2020-02-27 RX ORDER — CITALOPRAM 20 MG/1
20 TABLET ORAL DAILY
Qty: 30 TABLET | Refills: 3 | Status: SHIPPED | OUTPATIENT
Start: 2020-02-27 | End: 2020-07-29

## 2020-02-27 RX ORDER — FLUTICASONE PROPIONATE 50 MCG
1 SPRAY, SUSPENSION (ML) NASAL DAILY
Qty: 16 ML | Refills: 5 | Status: SHIPPED | OUTPATIENT
Start: 2020-02-27 | End: 2020-09-18 | Stop reason: SDUPTHER

## 2020-02-27 RX ORDER — ACETAMINOPHEN AND CODEINE PHOSPHATE 120; 12 MG/5ML; MG/5ML
1 SOLUTION ORAL DAILY
Qty: 28 TABLET | Refills: 11 | Status: SHIPPED | OUTPATIENT
Start: 2020-02-27 | End: 2021-01-14

## 2020-02-27 RX ORDER — MECLIZINE HCL 12.5 MG/1
12.5 TABLET ORAL 3 TIMES DAILY
COMMUNITY
Start: 2020-01-18 | End: 2020-02-27

## 2020-02-27 RX ORDER — TRAZODONE HYDROCHLORIDE 50 MG/1
TABLET ORAL
Qty: 30 TABLET | Refills: 3 | Status: SHIPPED | OUTPATIENT
Start: 2020-02-27 | End: 2020-02-28 | Stop reason: SDUPTHER

## 2020-02-27 NOTE — PROGRESS NOTES
Subjective   Laura Weathers is a 39 y.o. female.     Depression   Visit Type: follow-up (on Celexa with relief; still has trouble sleeping)  Patient is not experiencing: anhedonia, depressed mood, excessive worry, fatigue, insomnia, irritability, malaise, nervousness/anxiety, palpitations, panic, shortness of breath, suicidal ideas, suicidal planning, thoughts of death, weight gain and weight loss.  Frequency of symptoms: rarely   Severity: mild   Sleep per night: 7 hours  Sleep quality: good  Nighttime awakenings: occasional  Compliance with medications:  %        Hypertension   This is a chronic problem. The current episode started today. The problem is unchanged. The problem is controlled. Pertinent negatives include no blurred vision, chest pain, malaise/fatigue, palpitations, peripheral edema or shortness of breath. Risk factors for coronary artery disease include family history and obesity. Past treatments include diuretics and angiotensin blockers. Current antihypertension treatment includes angiotensin blockers and diuretics. The current treatment provides significant improvement. There are no compliance problems.    Needs TSH check. Continues to follow with endo.    The following portions of the patient's history were reviewed and updated as appropriate: allergies, current medications, past family history, past medical history, past social history, past surgical history and problem list.  Vitals:    02/27/20 1146   BP: 130/96   Pulse: 114   Resp: 18   Temp: 97.7 °F (36.5 °C)   SpO2: 97%     Body mass index is 42.77 kg/m².  Review of Systems   Constitutional: Negative.  Negative for activity change, fatigue, fever, irritability, malaise/fatigue, unexpected weight gain and unexpected weight loss.   HENT: Negative.  Negative for congestion, sneezing and sore throat.    Eyes: Negative.  Negative for blurred vision, double vision and visual disturbance.   Respiratory: Negative.  Negative for cough,  chest tightness, shortness of breath and wheezing.    Cardiovascular: Negative.  Negative for chest pain, palpitations and leg swelling.   Gastrointestinal: Negative.  Negative for abdominal distention, abdominal pain, blood in stool, constipation, diarrhea and nausea.   Endocrine: Negative.  Negative for cold intolerance and heat intolerance.   Genitourinary: Negative.  Negative for dysuria, frequency, urgency and urinary incontinence.   Musculoskeletal: Negative.  Negative for arthralgias and myalgias.   Skin: Negative.  Negative for rash.   Allergic/Immunologic: Negative.    Neurological: Negative.  Negative for dizziness, syncope, numbness and memory problem.   Hematological: Negative.  Negative for adenopathy.   Psychiatric/Behavioral: Negative.  Negative for suicidal ideas and depressed mood. The patient is not nervous/anxious and does not have insomnia.    All other systems reviewed and are negative.      Objective   Physical Exam   Constitutional: She is oriented to person, place, and time. She appears well-developed and well-nourished. No distress.   HENT:   Right Ear: External ear normal.   Left Ear: External ear normal.   Nose: Nose normal.   Mouth/Throat: Oropharynx is clear and moist.   Eyes: Pupils are equal, round, and reactive to light. Conjunctivae and EOM are normal.   Neck: Normal range of motion. Neck supple. No thyromegaly present.   Cardiovascular: Normal rate, regular rhythm and normal heart sounds.   No murmur heard.  Pulmonary/Chest: Effort normal and breath sounds normal. No respiratory distress. She has no wheezes.   Abdominal: Soft. Bowel sounds are normal. She exhibits no distension and no mass. There is no tenderness. There is no rebound and no guarding. No hernia.   Musculoskeletal: Normal range of motion. She exhibits no edema or tenderness.   Lymphadenopathy:     She has no cervical adenopathy.   Neurological: She is alert and oriented to person, place, and time. She has normal  reflexes.   Skin: Skin is warm and dry. No rash noted. She is not diaphoretic. No erythema. No pallor.   Psychiatric: She has a normal mood and affect. Her behavior is normal. Judgment and thought content normal.   Nursing note and vitals reviewed.          Assessment/Plan   Laura was seen today for depression and hypertension.    Diagnoses and all orders for this visit:    Essential hypertension  -     losartan-hydrochlorothiazide (HYZAAR) 50-12.5 MG per tablet; Take 1 tablet by mouth Daily.  -     CBC & Differential  -     Comprehensive Metabolic Panel  -     Lipid Panel  -     CBC Auto Differential    Mild episode of recurrent depressive disorder (CMS/HCC)  -     citalopram (CeleXA) 20 MG tablet; Take 1 tablet by mouth Daily.  -     traZODone (DESYREL) 50 MG tablet; 1-2 to 1 tab qhs prn insomnia    Primary insomnia  -     traZODone (DESYREL) 50 MG tablet; 1-2 to 1 tab qhs prn insomnia    Acquired hypothyroidism  -     TSH    Other orders  -     fluticasone (FLONASE) 50 MCG/ACT nasal spray; 1 spray by Each Nare route Daily.  -     norethindrone (MARJAN) 0.35 MG tablet; Take 1 tablet by mouth Daily.

## 2020-02-28 ENCOUNTER — TELEPHONE (OUTPATIENT)
Dept: FAMILY MEDICINE CLINIC | Facility: CLINIC | Age: 40
End: 2020-02-28

## 2020-02-28 DIAGNOSIS — F51.01 PRIMARY INSOMNIA: ICD-10-CM

## 2020-02-28 DIAGNOSIS — F33.0 MILD EPISODE OF RECURRENT DEPRESSIVE DISORDER (HCC): ICD-10-CM

## 2020-02-28 RX ORDER — TRAZODONE HYDROCHLORIDE 50 MG/1
TABLET ORAL
Qty: 30 TABLET | Refills: 3 | Status: SHIPPED | OUTPATIENT
Start: 2020-02-28 | End: 2020-09-18

## 2020-02-28 RX ORDER — TRAZODONE HYDROCHLORIDE 50 MG/1
TABLET ORAL
Qty: 30 TABLET | Refills: 3 | Status: SHIPPED | OUTPATIENT
Start: 2020-02-28

## 2020-02-28 NOTE — TELEPHONE ENCOUNTER
PT CALLED IN STATED SHE DID NOT RECEIVE THE RX FOR traZODone (DESYREL) 50 MG tablet AND WOULD LIKE FOR IT TO BE RESENT TO THE PHARM      PT CB NUMBER:[phone]  [facility]: [facility]IANA [facility]  FAX# [phone]

## 2020-03-20 ENCOUNTER — TELEPHONE (OUTPATIENT)
Dept: ENDOCRINOLOGY | Facility: CLINIC | Age: 40
End: 2020-03-20

## 2020-03-20 DIAGNOSIS — E89.0 POSTOPERATIVE HYPOTHYROIDISM: Primary | ICD-10-CM

## 2020-03-20 RX ORDER — LEVOTHYROXINE SODIUM 0.2 MG/1
200 TABLET ORAL DAILY
Qty: 90 TABLET | Refills: 3 | Status: SHIPPED | OUTPATIENT
Start: 2020-03-20 | End: 2020-09-24 | Stop reason: DRUGHIGH

## 2020-03-20 NOTE — TELEPHONE ENCOUNTER
Spoke to patient about lab results.  We will increase her levothyroxine to 200 mcg daily.  Will mail lab slip for her to have her TSH completed in [place] in 2 months.  Will adjust dose as indicated.   Patient has follow-up in November 2020  Signed: Valeria Lara MD

## 2020-03-20 NOTE — TELEPHONE ENCOUNTER
PATIENT IS NEEDING A REFILL ON HER SYNTHROID RX. SHE STATES THAT SHE HAD HER LABS DRAWN WITH DR CARSON' OFFICE. LAB RESULTS ARE IN HER CHART. [facility]

## 2020-07-29 DIAGNOSIS — F33.0 MILD EPISODE OF RECURRENT DEPRESSIVE DISORDER (HCC): ICD-10-CM

## 2020-07-29 RX ORDER — CITALOPRAM 20 MG/1
20 TABLET ORAL DAILY
Qty: 30 TABLET | Refills: 2 | Status: SHIPPED | OUTPATIENT
Start: 2020-07-29 | End: 2020-09-18 | Stop reason: SDUPTHER

## 2020-09-16 ENCOUNTER — TELEPHONE (OUTPATIENT)
Dept: INTERNAL MEDICINE | Facility: CLINIC | Age: 40
End: 2020-09-16

## 2020-09-16 DIAGNOSIS — I10 ESSENTIAL HYPERTENSION: ICD-10-CM

## 2020-09-16 RX ORDER — LOSARTAN POTASSIUM AND HYDROCHLOROTHIAZIDE 12.5; 5 MG/1; MG/1
1 TABLET ORAL DAILY
Qty: 30 TABLET | Refills: 0 | Status: SHIPPED | OUTPATIENT
Start: 2020-09-16 | End: 2020-09-18 | Stop reason: SDUPTHER

## 2020-09-16 NOTE — TELEPHONE ENCOUNTER
Heart has been racing and pt has noticed on her fit bit it was 130 heart rate. No shortness of breathe. Pt states this has been going on for about x3 months. Pt's scheduled for a Same Day this Friday @ 1:30pm/ good verbal understanding

## 2020-09-18 ENCOUNTER — OFFICE VISIT (OUTPATIENT)
Dept: FAMILY MEDICINE CLINIC | Facility: CLINIC | Age: 40
End: 2020-09-18

## 2020-09-18 VITALS
TEMPERATURE: 98 F | HEART RATE: 98 BPM | OXYGEN SATURATION: 98 % | SYSTOLIC BLOOD PRESSURE: 127 MMHG | WEIGHT: 265.2 LBS | BODY MASS INDEX: 42.62 KG/M2 | DIASTOLIC BLOOD PRESSURE: 78 MMHG | HEIGHT: 66 IN | RESPIRATION RATE: 18 BRPM

## 2020-09-18 DIAGNOSIS — I10 BENIGN ESSENTIAL HYPERTENSION: ICD-10-CM

## 2020-09-18 DIAGNOSIS — F33.0 MILD EPISODE OF RECURRENT DEPRESSIVE DISORDER (HCC): ICD-10-CM

## 2020-09-18 DIAGNOSIS — E89.0 POSTOPERATIVE HYPOTHYROIDISM: ICD-10-CM

## 2020-09-18 DIAGNOSIS — J30.1 NON-SEASONAL ALLERGIC RHINITIS DUE TO POLLEN: Primary | ICD-10-CM

## 2020-09-18 DIAGNOSIS — R00.2 PALPITATION: ICD-10-CM

## 2020-09-18 DIAGNOSIS — Z91.018 FOOD ALLERGY: ICD-10-CM

## 2020-09-18 DIAGNOSIS — I10 ESSENTIAL HYPERTENSION: ICD-10-CM

## 2020-09-18 DIAGNOSIS — F41.9 ANXIETY: ICD-10-CM

## 2020-09-18 PROCEDURE — 90471 IMMUNIZATION ADMIN: CPT | Performed by: FAMILY MEDICINE

## 2020-09-18 PROCEDURE — 36415 COLL VENOUS BLD VENIPUNCTURE: CPT | Performed by: FAMILY MEDICINE

## 2020-09-18 PROCEDURE — 80053 COMPREHEN METABOLIC PANEL: CPT | Performed by: FAMILY MEDICINE

## 2020-09-18 PROCEDURE — 93000 ELECTROCARDIOGRAM COMPLETE: CPT | Performed by: FAMILY MEDICINE

## 2020-09-18 PROCEDURE — 80061 LIPID PANEL: CPT | Performed by: FAMILY MEDICINE

## 2020-09-18 PROCEDURE — 90686 IIV4 VACC NO PRSV 0.5 ML IM: CPT | Performed by: FAMILY MEDICINE

## 2020-09-18 PROCEDURE — 99214 OFFICE O/P EST MOD 30 MIN: CPT | Performed by: FAMILY MEDICINE

## 2020-09-18 PROCEDURE — 85025 COMPLETE CBC W/AUTO DIFF WBC: CPT | Performed by: FAMILY MEDICINE

## 2020-09-18 PROCEDURE — 84443 ASSAY THYROID STIM HORMONE: CPT | Performed by: FAMILY MEDICINE

## 2020-09-18 RX ORDER — FLUTICASONE PROPIONATE 50 MCG
1 SPRAY, SUSPENSION (ML) NASAL DAILY
Qty: 16 ML | Refills: 5 | Status: SHIPPED | OUTPATIENT
Start: 2020-09-18 | End: 2021-03-11

## 2020-09-18 RX ORDER — METOPROLOL SUCCINATE 25 MG/1
25 TABLET, EXTENDED RELEASE ORAL DAILY
Qty: 30 TABLET | Refills: 3 | Status: SHIPPED | OUTPATIENT
Start: 2020-09-18 | End: 2020-09-18

## 2020-09-18 RX ORDER — CITALOPRAM 20 MG/1
20 TABLET ORAL DAILY
Qty: 30 TABLET | Refills: 3 | Status: SHIPPED | OUTPATIENT
Start: 2020-09-18 | End: 2021-03-17

## 2020-09-18 RX ORDER — LOSARTAN POTASSIUM AND HYDROCHLOROTHIAZIDE 12.5; 5 MG/1; MG/1
1 TABLET ORAL DAILY
Qty: 30 TABLET | Refills: 5 | Status: SHIPPED | OUTPATIENT
Start: 2020-09-18 | End: 2021-04-06

## 2020-09-18 NOTE — PROGRESS NOTES
Subjective   Laura Weathers is a 40 y.o. female.     Palpitations   This is a new problem. The current episode started more than 1 month ago. The problem occurs intermittently. The problem has been unchanged. The symptoms are aggravated by stress. Associated symptoms include anxiety. Pertinent negatives include no chest pain, coughing, dizziness, fever, malaise/fatigue, nausea, numbness or shortness of breath. She has tried nothing for the symptoms. Risk factors include obesity.   Arm Pain   Incident onset: right forearm pain and at elbow for a few weeks. There was no injury mechanism. The pain is present in the left elbow and left forearm. The quality of the pain is described as aching. The pain does not radiate. The pain is at a severity of 3/10. The pain is mild. The pain has been intermittent since the incident. Pertinent negatives include no chest pain, numbness or tingling. She has tried nothing for the symptoms. The treatment provided no relief.   Follows with endo for thyroid. Has not had follow up labs due to pandemic.     Has had increased sinus symptoms, had sinus surgery last year. Has seen dentist and had an xray which showed sinus disease and was given antibiotic. Has followed up with ENT and given further antibiotics and steroids 7/20 with some relief. 1 week ago had further symptoms and was given zpack per ENT with little relief. Will be following up with ENT if no improvement.     Had a colonoscopy scheduled last year but has not had yet. Pt concerned that she may be allergic to shrimp and shellfish.    The following portions of the patient's history were reviewed and updated as appropriate: allergies, current medications, past family history, past medical history, past social history, past surgical history and problem list.  Vitals:    09/18/20 1323   BP: 127/78   Pulse: 98   Resp: 18   Temp: 98 °F (36.7 °C)   SpO2: 98%     Body mass index is 42.8 kg/m².  Review of Systems   Constitutional:  Negative.  Negative for activity change, fatigue, fever, malaise/fatigue, unexpected weight gain and unexpected weight loss.   HENT: Negative.  Negative for congestion, sneezing and sore throat.    Eyes: Negative.  Negative for blurred vision, double vision and visual disturbance.   Respiratory: Negative.  Negative for cough, chest tightness, shortness of breath and wheezing.    Cardiovascular: Positive for palpitations. Negative for chest pain and leg swelling.   Gastrointestinal: Negative.  Negative for abdominal distention, abdominal pain, blood in stool, constipation, diarrhea and nausea.   Endocrine: Negative.  Negative for cold intolerance and heat intolerance.   Genitourinary: Negative.  Negative for dysuria, frequency, urgency and urinary incontinence.   Musculoskeletal: Negative.  Negative for arthralgias and myalgias.   Skin: Negative.  Negative for rash.   Allergic/Immunologic: Negative.    Neurological: Negative.  Negative for dizziness, tingling, syncope, numbness and memory problem.   Hematological: Negative.  Negative for adenopathy.   Psychiatric/Behavioral: Negative for suicidal ideas and depressed mood. The patient is nervous/anxious.    All other systems reviewed and are negative.      Objective   Physical Exam  Vitals signs and nursing note reviewed.   Constitutional:       General: She is not in acute distress.     Appearance: She is well-developed. She is not diaphoretic.   HENT:      Right Ear: External ear normal.      Left Ear: External ear normal.      Nose: Nose normal.   Eyes:      Conjunctiva/sclera: Conjunctivae normal.      Pupils: Pupils are equal, round, and reactive to light.   Neck:      Musculoskeletal: Normal range of motion and neck supple.      Thyroid: No thyromegaly.   Cardiovascular:      Rate and Rhythm: Normal rate and regular rhythm.      Heart sounds: Normal heart sounds. No murmur.   Pulmonary:      Effort: Pulmonary effort is normal. No respiratory distress.       Breath sounds: Normal breath sounds. No wheezing.   Abdominal:      General: Bowel sounds are normal. There is no distension.      Palpations: Abdomen is soft. There is no mass.      Tenderness: There is no abdominal tenderness. There is no guarding or rebound.      Hernia: No hernia is present.   Musculoskeletal: Normal range of motion.         General: No tenderness.   Lymphadenopathy:      Cervical: No cervical adenopathy.   Skin:     General: Skin is warm and dry.      Coloration: Skin is not pale.      Findings: No erythema or rash.   Neurological:      Mental Status: She is alert and oriented to person, place, and time.      Deep Tendon Reflexes: Reflexes are normal and symmetric.   Psychiatric:         Behavior: Behavior normal.         Thought Content: Thought content normal.         Judgment: Judgment normal.           ECG 12 Lead    Date/Time: 9/18/2020 1:49 PM  Performed by: Linda Young DO  Authorized by: Linda Young DO   Comparison: not compared with previous ECG   Previous ECG: no previous ECG available  Rhythm: sinus rhythm  Rate: normal  BPM: 86  Conduction: conduction normal  ST Segments: ST segments normal  T Waves: T waves normal  QRS axis: normal  Other: no other findings    Clinical impression: normal ECG              Assessment/Plan   Laura was seen today for palpitations, rapid heart rate and arm pain.    Diagnoses and all orders for this visit:    Non-seasonal allergic rhinitis due to pollen    Food allergy  -     Ambulatory Referral to Allergy    Palpitation  -     ECG 12 Lead  -     CBC & Differential  -     Comprehensive Metabolic Panel  -     TSH  -     Lipid Panel  -     CBC Auto Differential    Benign essential hypertension    Postoperative hypothyroidism    Mild episode of recurrent depressive disorder (CMS/HCC)  -     citalopram (CeleXA) 20 MG tablet; Take 1 tablet by mouth Daily.    Anxiety    Essential hypertension  -     losartan-hydrochlorothiazide (HYZAAR) 50-12.5 MG per  tablet; Take 1 tablet by mouth Daily.    Other orders  -     fluticasone (FLONASE) 50 MCG/ACT nasal spray; 1 spray by Each Nare route Daily.  -     Fluarix Quad >6 Months ([id number])  -     Discontinue: metoprolol succinate XL (TOPROL-XL) 25 MG 24 hr tablet; Take 1 tablet by mouth Daily.

## 2020-09-19 LAB
ALBUMIN SERPL-MCNC: 4.3 G/DL (ref 3.5–5.2)
ALBUMIN/GLOB SERPL: 1.6 G/DL
ALP SERPL-CCNC: 90 U/L (ref 39–117)
ALT SERPL W P-5'-P-CCNC: 25 U/L (ref 1–33)
ANION GAP SERPL CALCULATED.3IONS-SCNC: 12 MMOL/L (ref 5–15)
AST SERPL-CCNC: 14 U/L (ref 1–32)
BASOPHILS # BLD AUTO: 0.05 10*3/MM3 (ref 0–0.2)
BASOPHILS NFR BLD AUTO: 0.7 % (ref 0–1.5)
BILIRUB SERPL-MCNC: 0.4 MG/DL (ref 0–1.2)
BUN SERPL-MCNC: 8 MG/DL (ref 6–20)
BUN/CREAT SERPL: 10.8 (ref 7–25)
CALCIUM SPEC-SCNC: 9.3 MG/DL (ref 8.6–10.5)
CHLORIDE SERPL-SCNC: 104 MMOL/L (ref 98–107)
CHOLEST SERPL-MCNC: 174 MG/DL (ref 0–200)
CO2 SERPL-SCNC: 25 MMOL/L (ref 22–29)
CREAT SERPL-MCNC: 0.74 MG/DL (ref 0.57–1)
DEPRECATED RDW RBC AUTO: 40.4 FL (ref 37–54)
EOSINOPHIL # BLD AUTO: 0.09 10*3/MM3 (ref 0–0.4)
EOSINOPHIL NFR BLD AUTO: 1.3 % (ref 0.3–6.2)
ERYTHROCYTE [DISTWIDTH] IN BLOOD BY AUTOMATED COUNT: 13.2 % (ref 12.3–15.4)
GFR SERPL CREATININE-BSD FRML MDRD: 86 ML/MIN/1.73
GLOBULIN UR ELPH-MCNC: 2.7 GM/DL
GLUCOSE SERPL-MCNC: 81 MG/DL (ref 65–99)
HCT VFR BLD AUTO: 40.8 % (ref 34–46.6)
HDLC SERPL-MCNC: 25 MG/DL (ref 40–60)
HGB BLD-MCNC: 13.9 G/DL (ref 12–15.9)
IMM GRANULOCYTES # BLD AUTO: 0.02 10*3/MM3 (ref 0–0.05)
IMM GRANULOCYTES NFR BLD AUTO: 0.3 % (ref 0–0.5)
LDLC SERPL CALC-MCNC: 120 MG/DL (ref 0–100)
LDLC/HDLC SERPL: 4.78 {RATIO}
LYMPHOCYTES # BLD AUTO: 1.72 10*3/MM3 (ref 0.7–3.1)
LYMPHOCYTES NFR BLD AUTO: 25.1 % (ref 19.6–45.3)
MCH RBC QN AUTO: 28.5 PG (ref 26.6–33)
MCHC RBC AUTO-ENTMCNC: 34.1 G/DL (ref 31.5–35.7)
MCV RBC AUTO: 83.6 FL (ref 79–97)
MONOCYTES # BLD AUTO: 0.5 10*3/MM3 (ref 0.1–0.9)
MONOCYTES NFR BLD AUTO: 7.3 % (ref 5–12)
NEUTROPHILS NFR BLD AUTO: 4.46 10*3/MM3 (ref 1.7–7)
NEUTROPHILS NFR BLD AUTO: 65.3 % (ref 42.7–76)
NRBC BLD AUTO-RTO: 0 /100 WBC (ref 0–0.2)
PLATELET # BLD AUTO: 299 10*3/MM3 (ref 140–450)
PMV BLD AUTO: 10.5 FL (ref 6–12)
POTASSIUM SERPL-SCNC: 4.3 MMOL/L (ref 3.5–5.2)
PROT SERPL-MCNC: 7 G/DL (ref 6–8.5)
RBC # BLD AUTO: 4.88 10*6/MM3 (ref 3.77–5.28)
SODIUM SERPL-SCNC: 141 MMOL/L (ref 136–145)
TRIGL SERPL-MCNC: 147 MG/DL (ref 0–150)
TSH SERPL DL<=0.05 MIU/L-ACNC: 0.01 UIU/ML (ref 0.27–4.2)
VLDLC SERPL-MCNC: 29.4 MG/DL (ref 5–40)
WBC # BLD AUTO: 6.84 10*3/MM3 (ref 3.4–10.8)

## 2020-09-20 RX ORDER — LEVOTHYROXINE SODIUM 175 UG/1
175 TABLET ORAL DAILY
Qty: 30 TABLET | Refills: 3 | Status: SHIPPED | OUTPATIENT
Start: 2020-09-20 | End: 2020-11-30 | Stop reason: DRUGHIGH

## 2020-09-24 ENCOUNTER — TELEPHONE (OUTPATIENT)
Dept: ENDOCRINOLOGY | Facility: CLINIC | Age: 40
End: 2020-09-24

## 2020-09-24 NOTE — TELEPHONE ENCOUNTER
Spoke to patient about lab results. PCP has already decreased levothyroxine dose to 175 mcg. Agree with change in dose. Will check hr TFT's at her follow-up in 11/2020.   Signed: Valeria Lara MD

## 2020-09-24 NOTE — TELEPHONE ENCOUNTER
PT HAD SOME RECENT LABS DRAWN THEY ARE IN MY CHART SHE WANTED YOU TO LOOK AT THE TSH LEVEL AND CALL HER    PTS NUMBER [phone]

## 2020-11-23 ENCOUNTER — OFFICE VISIT (OUTPATIENT)
Dept: ENDOCRINOLOGY | Facility: CLINIC | Age: 40
End: 2020-11-23

## 2020-11-23 ENCOUNTER — LAB (OUTPATIENT)
Dept: LAB | Facility: HOSPITAL | Age: 40
End: 2020-11-23

## 2020-11-23 VITALS
WEIGHT: 274 LBS | HEIGHT: 66 IN | DIASTOLIC BLOOD PRESSURE: 80 MMHG | SYSTOLIC BLOOD PRESSURE: 132 MMHG | HEART RATE: 76 BPM | BODY MASS INDEX: 44.03 KG/M2

## 2020-11-23 DIAGNOSIS — C73 FOLLICULAR THYROID CANCER (HCC): Primary | ICD-10-CM

## 2020-11-23 DIAGNOSIS — E89.0 POSTOPERATIVE HYPOTHYROIDISM: ICD-10-CM

## 2020-11-23 PROCEDURE — 86800 THYROGLOBULIN ANTIBODY: CPT | Performed by: INTERNAL MEDICINE

## 2020-11-23 PROCEDURE — 99214 OFFICE O/P EST MOD 30 MIN: CPT | Performed by: INTERNAL MEDICINE

## 2020-11-23 PROCEDURE — 84443 ASSAY THYROID STIM HORMONE: CPT | Performed by: INTERNAL MEDICINE

## 2020-11-23 PROCEDURE — 84439 ASSAY OF FREE THYROXINE: CPT | Performed by: INTERNAL MEDICINE

## 2020-11-23 PROCEDURE — 84432 ASSAY OF THYROGLOBULIN: CPT | Performed by: INTERNAL MEDICINE

## 2020-11-24 LAB
T4 FREE SERPL-MCNC: 1.71 NG/DL (ref 0.93–1.7)
TSH SERPL DL<=0.05 MIU/L-ACNC: 0.03 UIU/ML (ref 0.27–4.2)

## 2020-11-25 LAB — REF LAB TEST METHOD: NORMAL

## 2020-11-30 ENCOUNTER — TELEPHONE (OUTPATIENT)
Dept: ENDOCRINOLOGY | Facility: CLINIC | Age: 40
End: 2020-11-30

## 2020-11-30 RX ORDER — LEVOTHYROXINE SODIUM 150 UG/1
150 TABLET ORAL DAILY
Qty: 90 TABLET | Refills: 3 | Status: SHIPPED | OUTPATIENT
Start: 2020-11-30 | End: 2020-12-03 | Stop reason: SDUPTHER

## 2020-11-30 NOTE — TELEPHONE ENCOUNTER
Spoke to patient about lab results. See clinic note from 11/23/2020 for details.   Signed: Valeria Lara MD

## 2020-12-03 DIAGNOSIS — E89.0 POSTOPERATIVE HYPOTHYROIDISM: ICD-10-CM

## 2020-12-03 RX ORDER — LEVOTHYROXINE SODIUM 150 UG/1
150 TABLET ORAL DAILY
Qty: 90 TABLET | Refills: 3 | Status: SHIPPED | OUTPATIENT
Start: 2020-12-03 | End: 2021-10-27

## 2020-12-03 NOTE — TELEPHONE ENCOUNTER
Pt called needing a refill on Levoxyl 150 mcg. Pharmacy never received prescription please resend. Pt last seen 11/23/20 pt next appt 11/29/21.

## 2021-01-05 ENCOUNTER — LAB (OUTPATIENT)
Dept: LAB | Facility: HOSPITAL | Age: 41
End: 2021-01-05

## 2021-01-05 DIAGNOSIS — J32.9 CHRONIC SINUSITIS, UNSPECIFIED LOCATION: Primary | ICD-10-CM

## 2021-01-05 PROCEDURE — 36415 COLL VENOUS BLD VENIPUNCTURE: CPT

## 2021-01-05 PROCEDURE — 86317 IMMUNOASSAY INFECTIOUS AGENT: CPT

## 2021-01-14 LAB
S PN DA SERO 19F IGG SER IA-MCNC: 2.2 UG/ML
S PNEUM DA 1 IGG SER IA-MCNC: 0.4 UG/ML
S PNEUM DA 10A IGG SER IA-MCNC: <0.1 UG/ML
S PNEUM DA 11A IGG SER IA-MCNC: 0.4 UG/ML
S PNEUM DA 12F IGG SER IA-MCNC: 0.2 UG/ML
S PNEUM DA 14 IGG SER IA-MCNC: 0.2 UG/ML
S PNEUM DA 15B IGG SER IA-MCNC: 1.2 UG/ML
S PNEUM DA 17F IGG SER IA-MCNC: 2.2 UG/ML
S PNEUM DA 18C IGG SER IA-MCNC: 0.5 UG/ML
S PNEUM DA 19A IGG SER IA-MCNC: 6.1 UG/ML
S PNEUM DA 2 IGG SER IA-MCNC: 4.2 UG/ML
S PNEUM DA 20A IGG SER IA-MCNC: 0.2 UG/ML
S PNEUM DA 22F IGG SER IA-MCNC: 0.2 UG/ML
S PNEUM DA 23F IGG SER IA-MCNC: 1.4 UG/ML
S PNEUM DA 3 IGG SER IA-MCNC: 2 UG/ML
S PNEUM DA 33F IGG SER IA-MCNC: 0.9 UG/ML
S PNEUM DA 4 IGG SER IA-MCNC: 0.1 UG/ML
S PNEUM DA 5 IGG SER IA-MCNC: 0.1 UG/ML
S PNEUM DA 6B IGG SER IA-MCNC: <0.1 UG/ML
S PNEUM DA 7F IGG SER IA-MCNC: 1.8 UG/ML
S PNEUM DA 8 IGG SER IA-MCNC: 0.7 UG/ML
S PNEUM DA 9N IGG SER IA-MCNC: 0.1 UG/ML
S PNEUM DA 9V IGG SER IA-MCNC: 0.3 UG/ML

## 2021-01-14 RX ORDER — NORETHINDRONE 0.35 MG/1
TABLET ORAL
Qty: 28 TABLET | Refills: 10 | Status: SHIPPED | OUTPATIENT
Start: 2021-01-14 | End: 2021-10-11

## 2021-01-26 RX ORDER — LEVOTHYROXINE SODIUM 175 UG/1
TABLET ORAL
Qty: 30 TABLET | Refills: 2 | Status: SHIPPED | OUTPATIENT
Start: 2021-01-26 | End: 2021-10-20

## 2021-03-11 RX ORDER — FLUTICASONE PROPIONATE 50 MCG
SPRAY, SUSPENSION (ML) NASAL
Qty: 16 G | Refills: 4 | Status: SHIPPED | OUTPATIENT
Start: 2021-03-11 | End: 2021-08-04

## 2021-03-16 DIAGNOSIS — F33.0 MILD EPISODE OF RECURRENT DEPRESSIVE DISORDER (HCC): ICD-10-CM

## 2021-03-17 RX ORDER — CITALOPRAM 20 MG/1
TABLET ORAL
Qty: 30 TABLET | Refills: 2 | Status: SHIPPED | OUTPATIENT
Start: 2021-03-17 | End: 2021-06-14

## 2021-03-17 NOTE — TELEPHONE ENCOUNTER
Caller: Laura Weathers    Relationship: Self    Best call back number: [phone]    Medication needed:   Requested Prescriptions     Pending Prescriptions Disp Refills   • citalopram (CeleXA) 20 MG tablet [Pharmacy Med Name: CITALOPRAM 20MG] 30 tablet 2     Sig: TAKE 1 TABLET BY MOUTH EVERY DAY       When do you need the refill by: ASAP    What additional details did the patient provide when requesting the medication: PATIENT IS OUT    Does the patient have less than a 3 day supply:  [x] Yes  [] No    What is the patient's preferred pharmacy: [facility] - [address] - [phone]  - [phone]

## 2021-03-23 ENCOUNTER — TRANSCRIBE ORDERS (OUTPATIENT)
Dept: LAB | Facility: HOSPITAL | Age: 41
End: 2021-03-23

## 2021-03-23 ENCOUNTER — LAB (OUTPATIENT)
Dept: LAB | Facility: HOSPITAL | Age: 41
End: 2021-03-23

## 2021-03-23 DIAGNOSIS — J32.9 CHRONIC SINUSITIS, UNSPECIFIED LOCATION: ICD-10-CM

## 2021-03-23 DIAGNOSIS — J32.9 CHRONIC SINUSITIS, UNSPECIFIED LOCATION: Primary | ICD-10-CM

## 2021-03-23 PROCEDURE — 36415 COLL VENOUS BLD VENIPUNCTURE: CPT

## 2021-03-23 PROCEDURE — 86317 IMMUNOASSAY INFECTIOUS AGENT: CPT

## 2021-03-23 PROCEDURE — 82787 IGG 1 2 3 OR 4 EACH: CPT

## 2021-03-23 PROCEDURE — 82784 ASSAY IGA/IGD/IGG/IGM EACH: CPT

## 2021-03-24 LAB
IGA1 MFR SER: 122 MG/DL (ref 70–400)
IGM SERPL-MCNC: 66 MG/DL (ref 40–230)

## 2021-03-25 LAB
IGG SERPL-MCNC: 958 MG/DL (ref 586–1602)
IGG1 SER-MCNC: 450 MG/DL (ref 248–810)
IGG2 SER-MCNC: 344 MG/DL (ref 130–555)
IGG3 SER-MCNC: 16 MG/DL (ref 15–102)
IGG4 SER-MCNC: 24 MG/DL (ref 2–96)

## 2021-03-31 LAB
S PN DA SERO 19F IGG SER IA-MCNC: 13 UG/ML
S PNEUM DA 1 IGG SER IA-MCNC: 2.4 UG/ML
S PNEUM DA 10A IGG SER IA-MCNC: 1.1 UG/ML
S PNEUM DA 11A IGG SER IA-MCNC: 5.2 UG/ML
S PNEUM DA 12F IGG SER IA-MCNC: 1.8 UG/ML
S PNEUM DA 14 IGG SER IA-MCNC: 1 UG/ML
S PNEUM DA 15B IGG SER IA-MCNC: 6.7 UG/ML
S PNEUM DA 17F IGG SER IA-MCNC: 22.3 UG/ML
S PNEUM DA 18C IGG SER IA-MCNC: 3.7 UG/ML
S PNEUM DA 19A IGG SER IA-MCNC: 17.2 UG/ML
S PNEUM DA 2 IGG SER IA-MCNC: 10.8 UG/ML
S PNEUM DA 20A IGG SER IA-MCNC: 3.1 UG/ML
S PNEUM DA 22F IGG SER IA-MCNC: 1.6 UG/ML
S PNEUM DA 23F IGG SER IA-MCNC: 20.3 UG/ML
S PNEUM DA 3 IGG SER IA-MCNC: 6.4 UG/ML
S PNEUM DA 33F IGG SER IA-MCNC: 8.2 UG/ML
S PNEUM DA 4 IGG SER IA-MCNC: 1.3 UG/ML
S PNEUM DA 5 IGG SER IA-MCNC: 4.9 UG/ML
S PNEUM DA 6B IGG SER IA-MCNC: 4.1 UG/ML
S PNEUM DA 7F IGG SER IA-MCNC: 7 UG/ML
S PNEUM DA 8 IGG SER IA-MCNC: 6.1 UG/ML
S PNEUM DA 9N IGG SER IA-MCNC: 1.2 UG/ML
S PNEUM DA 9V IGG SER IA-MCNC: 2.3 UG/ML

## 2021-04-06 DIAGNOSIS — I10 ESSENTIAL HYPERTENSION: ICD-10-CM

## 2021-04-06 RX ORDER — LOSARTAN POTASSIUM AND HYDROCHLOROTHIAZIDE 12.5; 5 MG/1; MG/1
TABLET ORAL
Qty: 30 TABLET | Refills: 4 | Status: SHIPPED | OUTPATIENT
Start: 2021-04-06 | End: 2021-08-26

## 2021-06-12 DIAGNOSIS — F33.0 MILD EPISODE OF RECURRENT DEPRESSIVE DISORDER (HCC): ICD-10-CM

## 2021-06-14 RX ORDER — CITALOPRAM 20 MG/1
TABLET ORAL
Qty: 30 TABLET | Refills: 1 | Status: SHIPPED | OUTPATIENT
Start: 2021-06-14 | End: 2021-08-16

## 2021-08-04 RX ORDER — FLUTICASONE PROPIONATE 50 MCG
SPRAY, SUSPENSION (ML) NASAL
Qty: 16 G | Refills: 3 | Status: SHIPPED | OUTPATIENT
Start: 2021-08-04 | End: 2021-11-23

## 2021-08-16 DIAGNOSIS — F33.0 MILD EPISODE OF RECURRENT DEPRESSIVE DISORDER (HCC): ICD-10-CM

## 2021-08-16 RX ORDER — CITALOPRAM 20 MG/1
TABLET ORAL
Qty: 30 TABLET | Refills: 0 | Status: SHIPPED | OUTPATIENT
Start: 2021-08-16 | End: 2021-09-16 | Stop reason: SDUPTHER

## 2021-08-26 DIAGNOSIS — I10 ESSENTIAL HYPERTENSION: ICD-10-CM

## 2021-08-26 RX ORDER — LOSARTAN POTASSIUM AND HYDROCHLOROTHIAZIDE 12.5; 5 MG/1; MG/1
TABLET ORAL
Qty: 30 TABLET | Refills: 0 | Status: SHIPPED | OUTPATIENT
Start: 2021-08-26 | End: 2021-10-20 | Stop reason: SDUPTHER

## 2021-09-16 DIAGNOSIS — F33.0 MILD EPISODE OF RECURRENT DEPRESSIVE DISORDER (HCC): ICD-10-CM

## 2021-09-16 RX ORDER — CITALOPRAM 20 MG/1
20 TABLET ORAL DAILY
Qty: 30 TABLET | Refills: 0 | Status: SHIPPED | OUTPATIENT
Start: 2021-09-16 | End: 2021-10-20 | Stop reason: SDUPTHER

## 2021-09-16 NOTE — TELEPHONE ENCOUNTER
Caller: Laura Weathers    Relationship: Self    Best call back number: [phone]    Medication needed:   Requested Prescriptions     Pending Prescriptions Disp Refills   • citalopram (CeleXA) 20 MG tablet 30 tablet 0     Sig: Take 1 tablet by mouth Daily.       When do you need the refill by: 09/16    What additional details did the patient provide when requesting the medication: PATIENT WOULD LIKE TO SEE IF SHE CAN HAVE A FEW REFILLS ADDED TO THIS ORDER TO MAKE IT EASIER FOR HER    Does the patient have less than a 3 day supply:  [x] Yes  [] No    What is the patient's preferred pharmacy: [facility] - [address] - [phone]  - [phone]

## 2021-10-01 DIAGNOSIS — I10 ESSENTIAL HYPERTENSION: ICD-10-CM

## 2021-10-01 RX ORDER — LOSARTAN POTASSIUM AND HYDROCHLOROTHIAZIDE 12.5; 5 MG/1; MG/1
TABLET ORAL
Qty: 30 TABLET | Refills: 0 | OUTPATIENT
Start: 2021-10-01

## 2021-10-04 ENCOUNTER — TELEPHONE (OUTPATIENT)
Dept: FAMILY MEDICINE CLINIC | Facility: CLINIC | Age: 41
End: 2021-10-04

## 2021-10-07 ENCOUNTER — TELEPHONE (OUTPATIENT)
Dept: FAMILY MEDICINE CLINIC | Facility: CLINIC | Age: 41
End: 2021-10-07

## 2021-10-07 NOTE — TELEPHONE ENCOUNTER
Hub staff attempted to follow warm transfer process and was unsuccessful     Caller: Laura Weathers    Relationship to patient: Self    Best call back number: [phone]    Patient is needing:   RETURNING A CALL FROM [facility]

## 2021-10-11 RX ORDER — NORETHINDRONE 0.35 MG/1
TABLET ORAL
Qty: 28 TABLET | Refills: 0 | Status: SHIPPED | OUTPATIENT
Start: 2021-10-11 | End: 2021-10-20 | Stop reason: SDUPTHER

## 2021-10-20 ENCOUNTER — OFFICE VISIT (OUTPATIENT)
Dept: FAMILY MEDICINE CLINIC | Facility: CLINIC | Age: 41
End: 2021-10-20

## 2021-10-20 VITALS
OXYGEN SATURATION: 97 % | BODY MASS INDEX: 44.58 KG/M2 | RESPIRATION RATE: 18 BRPM | WEIGHT: 277.4 LBS | DIASTOLIC BLOOD PRESSURE: 80 MMHG | HEIGHT: 66 IN | TEMPERATURE: 98.1 F | SYSTOLIC BLOOD PRESSURE: 130 MMHG | HEART RATE: 103 BPM

## 2021-10-20 DIAGNOSIS — I10 ESSENTIAL HYPERTENSION: Chronic | ICD-10-CM

## 2021-10-20 DIAGNOSIS — R00.2 PALPITATION: Primary | Chronic | ICD-10-CM

## 2021-10-20 DIAGNOSIS — F33.0 MILD EPISODE OF RECURRENT DEPRESSIVE DISORDER (HCC): Chronic | ICD-10-CM

## 2021-10-20 PROBLEM — Z91.048 ALLERGY TO MOLD: Status: ACTIVE | Noted: 2021-10-20

## 2021-10-20 PROBLEM — J30.81 ALLERGIC RHINITIS DUE TO ANIMAL HAIR AND DANDER: Status: ACTIVE | Noted: 2021-10-20

## 2021-10-20 PROBLEM — Z91.09 OTHER ALLERGY STATUS, OTHER THAN TO DRUGS AND BIOLOGICAL SUBSTANCES: Status: ACTIVE | Noted: 2021-10-20

## 2021-10-20 LAB
ALBUMIN SERPL-MCNC: 4.5 G/DL (ref 3.5–5.2)
ALBUMIN/GLOB SERPL: 1.7 G/DL
ALP SERPL-CCNC: 90 U/L (ref 39–117)
ALT SERPL W P-5'-P-CCNC: 26 U/L (ref 1–33)
ANION GAP SERPL CALCULATED.3IONS-SCNC: 11.4 MMOL/L (ref 5–15)
AST SERPL-CCNC: 20 U/L (ref 1–32)
BASOPHILS # BLD AUTO: 0.03 10*3/MM3 (ref 0–0.2)
BASOPHILS NFR BLD AUTO: 0.6 % (ref 0–1.5)
BILIRUB SERPL-MCNC: 0.6 MG/DL (ref 0–1.2)
BUN SERPL-MCNC: 11 MG/DL (ref 6–20)
BUN/CREAT SERPL: 14.3 (ref 7–25)
CALCIUM SPEC-SCNC: 8.8 MG/DL (ref 8.6–10.5)
CHLORIDE SERPL-SCNC: 104 MMOL/L (ref 98–107)
CHOLEST SERPL-MCNC: 190 MG/DL (ref 0–200)
CO2 SERPL-SCNC: 24.6 MMOL/L (ref 22–29)
CREAT SERPL-MCNC: 0.77 MG/DL (ref 0.57–1)
DEPRECATED RDW RBC AUTO: 40.8 FL (ref 37–54)
EOSINOPHIL # BLD AUTO: 0.14 10*3/MM3 (ref 0–0.4)
EOSINOPHIL NFR BLD AUTO: 2.7 % (ref 0.3–6.2)
ERYTHROCYTE [DISTWIDTH] IN BLOOD BY AUTOMATED COUNT: 13 % (ref 12.3–15.4)
GFR SERPL CREATININE-BSD FRML MDRD: 82 ML/MIN/1.73
GLOBULIN UR ELPH-MCNC: 2.6 GM/DL
GLUCOSE SERPL-MCNC: 74 MG/DL (ref 65–99)
HCT VFR BLD AUTO: 41.9 % (ref 34–46.6)
HDLC SERPL-MCNC: 26 MG/DL (ref 40–60)
HGB BLD-MCNC: 14.9 G/DL (ref 12–15.9)
IMM GRANULOCYTES # BLD AUTO: 0.01 10*3/MM3 (ref 0–0.05)
IMM GRANULOCYTES NFR BLD AUTO: 0.2 % (ref 0–0.5)
LDLC SERPL CALC-MCNC: 139 MG/DL (ref 0–100)
LDLC/HDLC SERPL: 5.25 {RATIO}
LYMPHOCYTES # BLD AUTO: 1.49 10*3/MM3 (ref 0.7–3.1)
LYMPHOCYTES NFR BLD AUTO: 28.3 % (ref 19.6–45.3)
MCH RBC QN AUTO: 31.2 PG (ref 26.6–33)
MCHC RBC AUTO-ENTMCNC: 35.6 G/DL (ref 31.5–35.7)
MCV RBC AUTO: 87.7 FL (ref 79–97)
MONOCYTES # BLD AUTO: 0.42 10*3/MM3 (ref 0.1–0.9)
MONOCYTES NFR BLD AUTO: 8 % (ref 5–12)
NEUTROPHILS NFR BLD AUTO: 3.18 10*3/MM3 (ref 1.7–7)
NEUTROPHILS NFR BLD AUTO: 60.2 % (ref 42.7–76)
NRBC BLD AUTO-RTO: 0 /100 WBC (ref 0–0.2)
PLATELET # BLD AUTO: 263 10*3/MM3 (ref 140–450)
PMV BLD AUTO: 10.3 FL (ref 6–12)
POTASSIUM SERPL-SCNC: 4.3 MMOL/L (ref 3.5–5.2)
PROT SERPL-MCNC: 7.1 G/DL (ref 6–8.5)
RBC # BLD AUTO: 4.78 10*6/MM3 (ref 3.77–5.28)
SODIUM SERPL-SCNC: 140 MMOL/L (ref 136–145)
TRIGL SERPL-MCNC: 138 MG/DL (ref 0–150)
VLDLC SERPL-MCNC: 25 MG/DL (ref 5–40)
WBC # BLD AUTO: 5.27 10*3/MM3 (ref 3.4–10.8)

## 2021-10-20 PROCEDURE — 80053 COMPREHEN METABOLIC PANEL: CPT | Performed by: FAMILY MEDICINE

## 2021-10-20 PROCEDURE — 85025 COMPLETE CBC W/AUTO DIFF WBC: CPT | Performed by: FAMILY MEDICINE

## 2021-10-20 PROCEDURE — 80061 LIPID PANEL: CPT | Performed by: FAMILY MEDICINE

## 2021-10-20 PROCEDURE — 99214 OFFICE O/P EST MOD 30 MIN: CPT | Performed by: FAMILY MEDICINE

## 2021-10-20 RX ORDER — METOPROLOL SUCCINATE 25 MG/1
25 TABLET, EXTENDED RELEASE ORAL DAILY
Qty: 30 TABLET | Refills: 5 | Status: SHIPPED | OUTPATIENT
Start: 2021-10-20 | End: 2022-04-26 | Stop reason: SDUPTHER

## 2021-10-20 RX ORDER — CITALOPRAM 20 MG/1
20 TABLET ORAL DAILY
Qty: 30 TABLET | Refills: 5 | Status: SHIPPED | OUTPATIENT
Start: 2021-10-20 | End: 2022-04-26 | Stop reason: SDUPTHER

## 2021-10-20 RX ORDER — FLUTICASONE PROPIONATE 93 UG/1
SPRAY, METERED NASAL
COMMUNITY
Start: 2021-10-20 | End: 2022-03-17

## 2021-10-20 RX ORDER — IPRATROPIUM BROMIDE 42 UG/1
SPRAY, METERED NASAL
COMMUNITY
Start: 2021-09-29 | End: 2022-03-24

## 2021-10-20 RX ORDER — ACETAMINOPHEN AND CODEINE PHOSPHATE 120; 12 MG/5ML; MG/5ML
1 SOLUTION ORAL DAILY
Qty: 28 TABLET | Refills: 11 | Status: SHIPPED | OUTPATIENT
Start: 2021-10-20 | End: 2022-11-09 | Stop reason: SDUPTHER

## 2021-10-20 RX ORDER — HYDROCHLOROTHIAZIDE 12.5 MG/1
TABLET ORAL
Qty: 30 TABLET | Refills: 1 | Status: SHIPPED | OUTPATIENT
Start: 2021-10-20 | End: 2021-12-13

## 2021-10-20 RX ORDER — LOSARTAN POTASSIUM AND HYDROCHLOROTHIAZIDE 12.5; 5 MG/1; MG/1
1 TABLET ORAL DAILY
Qty: 30 TABLET | Refills: 5 | Status: SHIPPED | OUTPATIENT
Start: 2021-10-20 | End: 2022-04-26 | Stop reason: SDUPTHER

## 2021-10-20 RX ORDER — AZELASTINE HYDROCHLORIDE 137 UG/1
1 SPRAY, METERED NASAL DAILY PRN
COMMUNITY
Start: 2021-09-14 | End: 2022-07-11 | Stop reason: SDUPTHER

## 2021-10-20 NOTE — PROGRESS NOTES
"Chief Complaint  Med Refill    Subjective          Laura Kaylah Weathers presents to [facility] for     Palpitations   This is a new problem. The current episode started 1 to 4 weeks ago. The problem occurs intermittently. The problem has been unchanged. Nothing aggravates the symptoms. Pertinent negatives include no anxiety, chest pain, coughing, dizziness, fever, irregular heartbeat, malaise/fatigue, nausea, numbness or shortness of breath. She has tried nothing for the symptoms. Improvement on treatment: no current symptoms. Risk factors include obesity, family history and stress. Her past medical history is significant for anxiety.   Hypertension  This is a chronic problem. The current episode started more than 1 year ago. The problem is unchanged. The problem is controlled. Associated symptoms include anxiety and palpitations. Pertinent negatives include no chest pain, malaise/fatigue or shortness of breath. There are no associated agents to hypertension. Risk factors for coronary artery disease include family history and obesity. Past treatments include angiotensin blockers and diuretics. Current antihypertension treatment includes angiotensin blockers and diuretics. There are no compliance problems.    Anxiety  Presents for follow-up (on Celexa) visit. Symptoms include palpitations. Patient reports no chest pain, depressed mood, dizziness, excessive worry, irritability, malaise, nausea, nervous/anxious behavior, panic, shortness of breath or suicidal ideas. Symptoms occur rarely. The patient sleeps 8 hours per night. The quality of sleep is good. Nighttime awakenings: occasional.     Her past medical history is significant for anxiety/panic attacks. Compliance with medications is %.       Objective   Vital Signs:   /80   Pulse 103   Temp 98.1 °F (36.7 °C) (Infrared)   Resp 18   Ht 167.6 cm (65.98\")   Wt 126 kg (277 lb 6.4 oz)   SpO2 97%   BMI 44.80 kg/m²     "   Review of Systems   Constitutional: Negative.  Negative for activity change, fatigue, fever, irritability, malaise/fatigue and unexpected weight change.   HENT: Negative.  Negative for congestion, sneezing and sore throat.    Eyes: Negative.  Negative for visual disturbance.   Respiratory: Negative.  Negative for cough, chest tightness, shortness of breath and wheezing.    Cardiovascular: Positive for palpitations. Negative for chest pain and leg swelling.   Gastrointestinal: Negative.  Negative for abdominal distention, abdominal pain, blood in stool, constipation, diarrhea and nausea.   Endocrine: Negative.  Negative for cold intolerance and heat intolerance.   Genitourinary: Negative.  Negative for dysuria, frequency and urgency.   Musculoskeletal: Negative.  Negative for arthralgias and myalgias.   Skin: Negative.  Negative for rash.   Allergic/Immunologic: Negative.    Neurological: Negative.  Negative for dizziness, syncope and numbness.   Hematological: Negative.  Negative for adenopathy.   Psychiatric/Behavioral: Negative.  Negative for suicidal ideas. The patient is not nervous/anxious.      Physical Exam  Vitals and nursing note reviewed.   Constitutional:       General: She is not in acute distress.     Appearance: She is well-developed. She is not diaphoretic.   HENT:      Right Ear: External ear normal.      Left Ear: External ear normal.      Nose: Nose normal.   Eyes:      Conjunctiva/sclera: Conjunctivae normal.      Pupils: Pupils are equal, round, and reactive to light.   Neck:      Thyroid: No thyromegaly.   Cardiovascular:      Rate and Rhythm: Normal rate and regular rhythm.      Heart sounds: Normal heart sounds. No murmur heard.      Pulmonary:      Effort: Pulmonary effort is normal. No respiratory distress.      Breath sounds: Normal breath sounds. No wheezing.   Abdominal:      General: Bowel sounds are normal. There is no distension.      Palpations: Abdomen is soft. There is no mass.       Tenderness: There is no abdominal tenderness. There is no guarding or rebound.      Hernia: No hernia is present.   Musculoskeletal:         General: No tenderness. Normal range of motion.      Cervical back: Normal range of motion and neck supple.   Lymphadenopathy:      Cervical: No cervical adenopathy.   Skin:     General: Skin is warm and dry.      Coloration: Skin is not pale.      Findings: No erythema or rash.   Neurological:      Mental Status: She is alert and oriented to person, place, and time.      Deep Tendon Reflexes: Reflexes are normal and symmetric.   Psychiatric:         Behavior: Behavior normal.         Thought Content: Thought content normal.         Judgment: Judgment normal.        Result Review :                 Assessment and Plan    Problem List Items Addressed This Visit     None      Visit Diagnoses     Palpitation  (Chronic)   -  Primary    Relevant Medications    metoprolol succinate XL (Toprol XL) 25 MG 24 hr tablet    Mild episode of recurrent depressive disorder (HCC)  (Chronic)       Relevant Medications    citalopram (CeleXA) 20 MG tablet    Essential hypertension  (Chronic)       Relevant Medications    metoprolol succinate XL (Toprol XL) 25 MG 24 hr tablet    losartan-hydrochlorothiazide (HYZAAR) 50-12.5 MG per tablet    hydroCHLOROthiazide (HYDRODIURIL) 12.5 MG tablet    Other Relevant Orders    CBC & Differential    Comprehensive Metabolic Panel    Lipid Panel          Follow Up   Return in about 6 months (around 4/20/2022).  Patient was given instructions and counseling regarding her condition or for health maintenance advice. Please see specific information pulled into the AVS if appropriate.

## 2021-10-27 DIAGNOSIS — E89.0 POSTOPERATIVE HYPOTHYROIDISM: ICD-10-CM

## 2021-10-27 RX ORDER — LEVOTHYROXINE SODIUM 150 UG/1
TABLET ORAL
Qty: 90 TABLET | Refills: 0 | Status: SHIPPED | OUTPATIENT
Start: 2021-10-27 | End: 2021-12-05 | Stop reason: SDUPTHER

## 2021-11-23 RX ORDER — FLUTICASONE PROPIONATE 50 MCG
SPRAY, SUSPENSION (ML) NASAL
Qty: 16 G | Refills: 3 | Status: SHIPPED | OUTPATIENT
Start: 2021-11-23 | End: 2022-03-17

## 2021-11-29 ENCOUNTER — OFFICE VISIT (OUTPATIENT)
Dept: ENDOCRINOLOGY | Facility: CLINIC | Age: 41
End: 2021-11-29

## 2021-11-29 ENCOUNTER — LAB (OUTPATIENT)
Dept: LAB | Facility: HOSPITAL | Age: 41
End: 2021-11-29

## 2021-11-29 VITALS
HEART RATE: 95 BPM | BODY MASS INDEX: 44.84 KG/M2 | WEIGHT: 279 LBS | SYSTOLIC BLOOD PRESSURE: 128 MMHG | HEIGHT: 66 IN | OXYGEN SATURATION: 98 % | DIASTOLIC BLOOD PRESSURE: 72 MMHG

## 2021-11-29 DIAGNOSIS — C73 FOLLICULAR THYROID CANCER (HCC): Primary | ICD-10-CM

## 2021-11-29 DIAGNOSIS — E89.0 POSTOPERATIVE HYPOTHYROIDISM: ICD-10-CM

## 2021-11-29 PROCEDURE — 84432 ASSAY OF THYROGLOBULIN: CPT | Performed by: INTERNAL MEDICINE

## 2021-11-29 PROCEDURE — 84443 ASSAY THYROID STIM HORMONE: CPT | Performed by: INTERNAL MEDICINE

## 2021-11-29 PROCEDURE — 84439 ASSAY OF FREE THYROXINE: CPT | Performed by: INTERNAL MEDICINE

## 2021-11-29 PROCEDURE — 99213 OFFICE O/P EST LOW 20 MIN: CPT | Performed by: INTERNAL MEDICINE

## 2021-11-29 PROCEDURE — 86800 THYROGLOBULIN ANTIBODY: CPT | Performed by: INTERNAL MEDICINE

## 2021-11-30 LAB
T4 FREE SERPL-MCNC: 1.62 NG/DL (ref 0.93–1.7)
TSH SERPL DL<=0.05 MIU/L-ACNC: 2.42 UIU/ML (ref 0.27–4.2)

## 2021-12-01 LAB — REF LAB TEST METHOD: NORMAL

## 2021-12-05 RX ORDER — LEVOTHYROXINE SODIUM 150 UG/1
150 TABLET ORAL DAILY
Qty: 90 TABLET | Refills: 3 | Status: SHIPPED | OUTPATIENT
Start: 2021-12-05 | End: 2022-12-12 | Stop reason: SDUPTHER

## 2021-12-13 DIAGNOSIS — I10 ESSENTIAL HYPERTENSION: Chronic | ICD-10-CM

## 2021-12-13 RX ORDER — HYDROCHLOROTHIAZIDE 12.5 MG/1
TABLET ORAL
Qty: 30 TABLET | Refills: 0 | Status: SHIPPED | OUTPATIENT
Start: 2021-12-13 | End: 2022-04-27 | Stop reason: SDUPTHER

## 2022-01-31 NOTE — TELEPHONE ENCOUNTER
Pt requesting refill. Last seen 11/23/2020. Next appt sched 11/29/2021. Med pended for review.      Spontaneous, unlabored and symmetrical

## 2022-03-17 ENCOUNTER — LAB (OUTPATIENT)
Dept: LAB | Facility: HOSPITAL | Age: 42
End: 2022-03-17

## 2022-03-17 ENCOUNTER — OFFICE VISIT (OUTPATIENT)
Dept: FAMILY MEDICINE CLINIC | Facility: CLINIC | Age: 42
End: 2022-03-17

## 2022-03-17 VITALS
TEMPERATURE: 99 F | WEIGHT: 281 LBS | OXYGEN SATURATION: 98 % | RESPIRATION RATE: 20 BRPM | HEIGHT: 66 IN | BODY MASS INDEX: 45.16 KG/M2 | HEART RATE: 96 BPM | SYSTOLIC BLOOD PRESSURE: 130 MMHG | DIASTOLIC BLOOD PRESSURE: 90 MMHG

## 2022-03-17 DIAGNOSIS — Z82.49 FAMILY HISTORY OF ISCHEMIC HEART DISEASE (IHD): ICD-10-CM

## 2022-03-17 DIAGNOSIS — R53.82 CHRONIC FATIGUE: Primary | ICD-10-CM

## 2022-03-17 DIAGNOSIS — R29.818 SUSPECTED SLEEP APNEA: ICD-10-CM

## 2022-03-17 DIAGNOSIS — J30.9 ALLERGIC RHINITIS, UNSPECIFIED SEASONALITY, UNSPECIFIED TRIGGER: ICD-10-CM

## 2022-03-17 DIAGNOSIS — R00.2 INTERMITTENT PALPITATIONS: ICD-10-CM

## 2022-03-17 DIAGNOSIS — R06.83 SNORING: ICD-10-CM

## 2022-03-17 LAB
25(OH)D3 SERPL-MCNC: 9.4 NG/ML (ref 30–100)
ALBUMIN SERPL-MCNC: 4.4 G/DL (ref 3.5–5.2)
ALBUMIN/GLOB SERPL: 1.6 G/DL
ALP SERPL-CCNC: 95 U/L (ref 39–117)
ALT SERPL W P-5'-P-CCNC: 25 U/L (ref 1–33)
ANION GAP SERPL CALCULATED.3IONS-SCNC: 12.3 MMOL/L (ref 5–15)
AST SERPL-CCNC: 15 U/L (ref 1–32)
BASOPHILS # BLD AUTO: 0.06 10*3/MM3 (ref 0–0.2)
BASOPHILS NFR BLD AUTO: 0.9 % (ref 0–1.5)
BILIRUB SERPL-MCNC: 0.6 MG/DL (ref 0–1.2)
BUN SERPL-MCNC: 9 MG/DL (ref 6–20)
BUN/CREAT SERPL: 10.8 (ref 7–25)
CALCIUM SPEC-SCNC: 9.3 MG/DL (ref 8.6–10.5)
CHLORIDE SERPL-SCNC: 104 MMOL/L (ref 98–107)
CO2 SERPL-SCNC: 24.7 MMOL/L (ref 22–29)
CREAT SERPL-MCNC: 0.83 MG/DL (ref 0.57–1)
DEPRECATED RDW RBC AUTO: 43.1 FL (ref 37–54)
EGFRCR SERPLBLD CKD-EPI 2021: 89.3 ML/MIN/1.73
EOSINOPHIL # BLD AUTO: 0.08 10*3/MM3 (ref 0–0.4)
EOSINOPHIL NFR BLD AUTO: 1.2 % (ref 0.3–6.2)
ERYTHROCYTE [DISTWIDTH] IN BLOOD BY AUTOMATED COUNT: 13 % (ref 12.3–15.4)
GLOBULIN UR ELPH-MCNC: 2.8 GM/DL
GLUCOSE SERPL-MCNC: 86 MG/DL (ref 65–99)
HCT VFR BLD AUTO: 45.9 % (ref 34–46.6)
HGB BLD-MCNC: 15.6 G/DL (ref 12–15.9)
IMM GRANULOCYTES # BLD AUTO: 0.02 10*3/MM3 (ref 0–0.05)
IMM GRANULOCYTES NFR BLD AUTO: 0.3 % (ref 0–0.5)
IRON 24H UR-MRATE: 113 MCG/DL (ref 37–145)
IRON SATN MFR SERPL: 35 % (ref 20–50)
LYMPHOCYTES # BLD AUTO: 1.64 10*3/MM3 (ref 0.7–3.1)
LYMPHOCYTES NFR BLD AUTO: 25 % (ref 19.6–45.3)
MCH RBC QN AUTO: 30.6 PG (ref 26.6–33)
MCHC RBC AUTO-ENTMCNC: 34 G/DL (ref 31.5–35.7)
MCV RBC AUTO: 90 FL (ref 79–97)
MONOCYTES # BLD AUTO: 0.37 10*3/MM3 (ref 0.1–0.9)
MONOCYTES NFR BLD AUTO: 5.6 % (ref 5–12)
NEUTROPHILS NFR BLD AUTO: 4.39 10*3/MM3 (ref 1.7–7)
NEUTROPHILS NFR BLD AUTO: 67 % (ref 42.7–76)
NRBC BLD AUTO-RTO: 0 /100 WBC (ref 0–0.2)
PLATELET # BLD AUTO: 304 10*3/MM3 (ref 140–450)
PMV BLD AUTO: 10.3 FL (ref 6–12)
POTASSIUM SERPL-SCNC: 4.2 MMOL/L (ref 3.5–5.2)
PROT SERPL-MCNC: 7.2 G/DL (ref 6–8.5)
RBC # BLD AUTO: 5.1 10*6/MM3 (ref 3.77–5.28)
SODIUM SERPL-SCNC: 141 MMOL/L (ref 136–145)
TIBC SERPL-MCNC: 325 MCG/DL (ref 298–536)
TRANSFERRIN SERPL-MCNC: 218 MG/DL (ref 200–360)
TSH SERPL DL<=0.05 MIU/L-ACNC: 0.28 UIU/ML (ref 0.27–4.2)
VIT B12 BLD-MCNC: >2000 PG/ML (ref 211–946)
WBC NRBC COR # BLD: 6.56 10*3/MM3 (ref 3.4–10.8)

## 2022-03-17 PROCEDURE — 84466 ASSAY OF TRANSFERRIN: CPT | Performed by: NURSE PRACTITIONER

## 2022-03-17 PROCEDURE — 80050 GENERAL HEALTH PANEL: CPT | Performed by: NURSE PRACTITIONER

## 2022-03-17 PROCEDURE — 83540 ASSAY OF IRON: CPT | Performed by: NURSE PRACTITIONER

## 2022-03-17 PROCEDURE — 99214 OFFICE O/P EST MOD 30 MIN: CPT | Performed by: NURSE PRACTITIONER

## 2022-03-17 PROCEDURE — 82306 VITAMIN D 25 HYDROXY: CPT | Performed by: NURSE PRACTITIONER

## 2022-03-17 PROCEDURE — 93000 ELECTROCARDIOGRAM COMPLETE: CPT | Performed by: NURSE PRACTITIONER

## 2022-03-17 PROCEDURE — 82607 VITAMIN B-12: CPT | Performed by: NURSE PRACTITIONER

## 2022-03-17 RX ORDER — FLUTICASONE PROPIONATE 50 MCG
1 SPRAY, SUSPENSION (ML) NASAL DAILY
Qty: 16 G | Refills: 3 | Status: SHIPPED | OUTPATIENT
Start: 2022-03-17 | End: 2022-07-18

## 2022-03-17 NOTE — PROGRESS NOTES
Follow Up Office Note     Patient Name: Laura Weathers  : 1980   MRN: [id number]     Chief Complaint:    Chief Complaint   Patient presents with   • Fatigue   • Hand Pain     Having swelling in fingers       History of Present Illness: Laura Weathers is a 42 y.o. female who presents today to establish care with a new PCP, she is off boarding from Dr. Linda Young.  Patient has a new complaint today of swelling in her hands and fingers as well as fatigue. Patient states that fatigue began in January of this year. She states that she has no energy, decreased libido and has difficulty getting through her day. She is concerned that she could be having heart issues. She states that she wakes up at night feeling short of breath with her heart racing. She states that she had a sister who passed away at age 28 from a heart attack.  She is complaining of occasional heart palpitations.      Subjective      Review of Systems:   Review of Systems   Constitutional: Positive for activity change and fatigue. Negative for appetite change, chills, diaphoresis and fever.   HENT: Negative.    Respiratory: Negative for cough, chest tightness, shortness of breath and stridor.    Cardiovascular: Positive for palpitations. Negative for chest pain and leg swelling.        Swelling in hands   Gastrointestinal: Negative.    Genitourinary: Negative.    Allergic/Immunologic: Positive for environmental allergies.   Neurological: Negative.         Past Medical History:   Past Medical History:   Diagnosis Date   • Allergic    • Follicular thyroid cancer (HCC) 2017    T3N0M0 follicular thyroid cancer s/p two stage thyroidectomy and I -131 remnant ablation   • Hypertension    • Postoperative hypothyroidism 2013   • Preeclampsia     delivered, with a postpartum complication         Medications:     Current Outpatient Medications:   •  Azelastine HCl 137 MCG/SPRAY solution, , Disp: , Rfl:   •  citalopram (CeleXA) 20  "MG tablet, Take 1 tablet by mouth Daily., Disp: 30 tablet, Rfl: 5  •  Cyanocobalamin (VITAMIN B-12 PO), Take 1,000 mg by mouth., Disp: , Rfl:   •  fexofenadine (ALLEGRA) 180 MG tablet, Take 180 mg by mouth Daily., Disp: , Rfl:   •  fluticasone (FLONASE) 50 MCG/ACT nasal spray, 1 spray by Each Nare route Daily., Disp: 16 g, Rfl: 3  •  hydroCHLOROthiazide (HYDRODIURIL) 12.5 MG tablet, TAKE 1 TABLET BY MOUTH EVERY DAY AS NEEDED, Disp: 30 tablet, Rfl: 0  •  Levoxyl 150 MCG tablet, Take 1 tablet by mouth Daily., Disp: 90 tablet, Rfl: 3  •  losartan-hydrochlorothiazide (HYZAAR) 50-12.5 MG per tablet, Take 1 tablet by mouth Daily., Disp: 30 tablet, Rfl: 5  •  metoprolol succinate XL (Toprol XL) 25 MG 24 hr tablet, Take 1 tablet by mouth Daily., Disp: 30 tablet, Rfl: 5  •  norethindrone (Giovanna) 0.35 MG tablet, Take 1 tablet by mouth Daily., Disp: 28 tablet, Rfl: 11  •  potassium chloride (KLOR-CON) 20 MEQ packet, Take 20 mEq by mouth Daily., Disp: , Rfl:   •  traZODone (DESYREL) 50 MG tablet, 1-2 to 1 tab qhs prn insomnia, Disp: 30 tablet, Rfl: 3  •  ipratropium (ATROVENT) 0.06 % nasal spray, INSTILL 2 SPRAY INTO THE NOSTRIL(S) EVERY DAY, Disp: , Rfl:   •  vitamin D (ERGOCALCIFEROL) 1.25 MG (37499 UT) capsule capsule, Take 1 capsule by mouth 1 (One) Time Per Week., Disp: 5 capsule, Rfl: 3    Allergies:   Allergies   Allergen Reactions   • Prednisone Anaphylaxis         Objective     Physical Exam:  Vital Signs:   Vitals:    03/17/22 0851   BP: 130/90   Pulse: 96   Resp: 20   Temp: 99 °F (37.2 °C)   SpO2: 98%   Weight: 127 kg (281 lb)   Height: 167.6 cm (66\")   PainSc: 0-No pain     Body mass index is 45.35 kg/m².     Physical Exam  Vitals and nursing note reviewed.   Constitutional:       General: She is not in acute distress.     Appearance: Normal appearance. She is well-developed. She is morbidly obese. She is not ill-appearing, toxic-appearing or diaphoretic.   HENT:      Head: Normocephalic and atraumatic.      Right " Ear: Tympanic membrane normal.      Left Ear: Tympanic membrane normal.      Nose: Congestion present.      Mouth/Throat:      Mouth: Mucous membranes are moist.      Pharynx: Oropharynx is clear.   Neck:      Vascular: No carotid bruit.   Cardiovascular:      Rate and Rhythm: Normal rate and regular rhythm.      Heart sounds: Normal heart sounds, S1 normal and S2 normal. No murmur heard.  Pulmonary:      Effort: Pulmonary effort is normal. No respiratory distress.      Breath sounds: Normal breath sounds. No stridor. No wheezing.   Musculoskeletal:      Right hand: Normal.      Left hand: Normal.      Cervical back: Normal range of motion and neck supple.      Right lower leg: No edema.      Left lower leg: No edema.   Lymphadenopathy:      Cervical: No cervical adenopathy.   Skin:     General: Skin is warm and dry.   Neurological:      General: No focal deficit present.      Mental Status: She is alert and oriented to person, place, and time.   Psychiatric:         Mood and Affect: Mood normal.         Behavior: Behavior normal. Behavior is cooperative.         Thought Content: Thought content normal.         Judgment: Judgment normal.         Assessment / Plan      Assessment/Plan:   Diagnoses and all orders for this visit:    1. Chronic fatigue (Primary)  -     Comprehensive Metabolic Panel  -     CBC Auto Differential  -     Iron Profile  -     TSH  -     Vitamin B12  -     Vitamin D 25 Hydroxy    2. Intermittent palpitations  -     ECG 12 Lead  -     Ambulatory Referral to [facility] [place] [facility]    3. Suspected sleep apnea  -     Ambulatory Referral to Sleep Medicine    4. Snoring  -     Ambulatory Referral to Sleep Medicine    5. Allergic rhinitis, unspecified seasonality, unspecified trigger  -     fluticasone (FLONASE) 50 MCG/ACT nasal spray; 1 spray by Each Nare route Daily.  Dispense: 16 g; Refill: 3    6. Family history of ischemic heart disease (IHD)  -     ECG 12 Lead           ECG 12  Lead    Date/Time: 3/17/2022 9:55 AM  Performed by: Christiane Aguilar APRN  Authorized by: Christiane Aguilar APRN   Comparison: compared with previous ECG from 9/18/2020  Similar to previous ECG  Rhythm: sinus rhythm  Rate: normal  BPM: 84  Conduction: conduction normal  QRS axis: normal  Other: no other findings    Clinical impression: normal ECG        Laboratory studies per orders, further recommendation after laboratory evaluation.    Follow Up:   PRN and at next scheduled appointment(s) with PCP.    Discussed the nature of the medical condition(s) risks, complications, implications, management, safe and proper use of medications. Encouraged medication compliance, and keeping scheduled follow up appointments with me and any other providers.      RTC if symptoms fail to improve, to ER if symptoms worsen.      DUANE Sawyer  [facility] [place]

## 2022-03-18 DIAGNOSIS — E55.9 VITAMIN D DEFICIENCY: Primary | ICD-10-CM

## 2022-03-18 RX ORDER — ERGOCALCIFEROL 1.25 MG/1
50000 CAPSULE ORAL WEEKLY
Qty: 5 CAPSULE | Refills: 3 | Status: SHIPPED | OUTPATIENT
Start: 2022-03-18 | End: 2022-08-04

## 2022-03-23 NOTE — PROGRESS NOTES
"[facility]  [facility]    Chief Complaint  Hypertension, Palpitations, Rapid Heart Rate, and Establish Care    Subjective    History of Present Illness {CC  Problem List  Visit  Diagnosis   Encounters  Notes  Medications  Labs  Result Review Imaging  Media :23}     Laura Weathers is a 42 y.o. female with Anxiety, hyperlipidemia, hypertension who presents today for evaluation of palpitations at the request of Christiane ZEPEDA.  Patient reports a several month history of worsening fatigue.  She reports episodes of waking up at night feeling short of breath heart racing.  She was started on metoprolol. She is scheduled for a sleep study. She reports that her hands feel swollen all the time. She takes extra  HCTZ as needed.  She notes some recent infrequent palpitations. She is concerned because she had a sister who passed away at age 28 from SCD. She reports that she passed away in her sleep and autopsy report said she had a heart attack. She reports her mother also has an \"enarged heart\". She reports that she developed HTN with her first child and postpartum preeclampsia after her second child.     She reports some exertional dyspnea that improves some with inhaler. She reports she has gained about 20lbs in the past 6months. She notes occasional \"ache\" when she walks on the treadmill, but goes away if she stretches her chest    She reports that she a stress test and echo in 2014 that was reportedly normal     Cardiac risks: Obesity, hypertension, hyperlipidemia, family history    Objective     Vital Signs:   Vitals:    03/24/22 0915 03/24/22 0916 03/24/22 0918   BP: 139/99 143/93 132/91   BP Location: Right arm Left arm Left arm   Patient Position: Sitting Standing Sitting   Cuff Size: Adult Adult Adult   Pulse: 88 91 87   Resp:   20   Temp:   98.3 °F (36.8 °C)   TempSrc:   Temporal   SpO2: 98% 97% 98%   Weight:   127 kg (280 lb 6 oz)   Height:   167.6 cm (66\") "     Body mass index is 45.25 kg/m².  Physical Exam  Vitals reviewed.   Constitutional:       Appearance: Normal appearance.   HENT:      Head: Normocephalic.   Neck:      Vascular: No carotid bruit.   Cardiovascular:      Rate and Rhythm: Normal rate and regular rhythm.      Pulses: Normal pulses.      Heart sounds: Normal heart sounds, S1 normal and S2 normal. No murmur heard.  Pulmonary:      Effort: Pulmonary effort is normal. No respiratory distress.      Breath sounds: Normal breath sounds.   Chest:      Chest wall: No tenderness.   Abdominal:      General: Abdomen is flat.      Palpations: Abdomen is soft.   Musculoskeletal:      Cervical back: Neck supple.      Right lower leg: No edema.      Left lower leg: No edema.   Skin:     General: Skin is warm and dry.   Neurological:      General: No focal deficit present.      Mental Status: She is alert and oriented to person, place, and time. Mental status is at baseline.   Psychiatric:         Mood and Affect: Mood normal.         Behavior: Behavior normal.         Thought Content: Thought content normal.              Result Review  Data Reviewed:{ Labs  Result Review  Imaging  Med Tab  Media :23}   Vitamin D 25 Hydroxy (03/17/2022 10:21)  Vitamin B12 (03/17/2022 10:21)  TSH (03/17/2022 10:21)  CBC Auto Differential (03/17/2022 10:21)  Iron Profile (03/17/2022 10:21)  Comprehensive Metabolic Panel (03/17/2022 10:21)  ECG 12 Lead (03/17/2022 09:55)  Lipid Panel (10/20/2021 12:06)    Consultant notes Christiane ZEPEDA            Assessment and Plan {CC Problem List  Visit Diagnosis  ROS  Review (Popup)  Health Maintenance  Quality  BestPractice  Medications  SmartSets  SnapShot Encounters  Media :23}   1. SIEGEL (dyspnea on exertion)  Possibly secondary to asthma, but will rule out structural heart abnormality in the setting of family hx  - Adult Transthoracic Echo Complete W/ Cont if Necessary Per Protocol; Future  - Treadmill Stress Test;  Future    2. Chest pain, unspecified type  She has some atypical chest pain but due to worsening fatigue and family history we will do GXT  - Adult Transthoracic Echo Complete W/ Cont if Necessary Per Protocol; Future  - Treadmill Stress Test; Future    3. Hypertension, unspecified type  Appears to be mostly controlled.  Continue close monitoring  Has upcoming sleep evaluation  - Adult Transthoracic Echo Complete W/ Cont if Necessary Per Protocol; Future  - Treadmill Stress Test; Future    4. Family history of sudden cardiac death  Data deficit. Possible arrhythmia  - Adult Transthoracic Echo Complete W/ Cont if Necessary Per Protocol; Future  - Treadmill Stress Test; Future    5. Palpitations    - Holter Monitor - 72 Hour Up To 15 Days; Future  - Adult Transthoracic Echo Complete W/ Cont if Necessary Per Protocol; Future  - Treadmill Stress Test; Future        Follow Up {Instructions Charge Capture  Follow-up Communications :23}   Return in about 5 weeks (around 4/28/2022) for Monitor results, Video Visit.    Patient was given instructions and counseling regarding her condition or for health maintenance advice. Please see specific information pulled into the AVS if appropriate.  Advised to call the Heart and Valve Center with any questions, concerns, or worsening symptoms.

## 2022-03-24 ENCOUNTER — HOSPITAL ENCOUNTER (OUTPATIENT)
Dept: CARDIOLOGY | Facility: HOSPITAL | Age: 42
Discharge: HOME | End: 2022-03-24

## 2022-03-24 ENCOUNTER — OFFICE VISIT (OUTPATIENT)
Dept: CARDIOLOGY | Facility: HOSPITAL | Age: 42
End: 2022-03-24

## 2022-03-24 VITALS
WEIGHT: 280.38 LBS | HEART RATE: 87 BPM | DIASTOLIC BLOOD PRESSURE: 91 MMHG | OXYGEN SATURATION: 98 % | TEMPERATURE: 98.3 F | SYSTOLIC BLOOD PRESSURE: 132 MMHG | BODY MASS INDEX: 45.06 KG/M2 | HEIGHT: 66 IN | RESPIRATION RATE: 20 BRPM

## 2022-03-24 DIAGNOSIS — R00.2 PALPITATIONS: ICD-10-CM

## 2022-03-24 DIAGNOSIS — R06.09 DOE (DYSPNEA ON EXERTION): Primary | ICD-10-CM

## 2022-03-24 DIAGNOSIS — I10 HYPERTENSION, UNSPECIFIED TYPE: ICD-10-CM

## 2022-03-24 DIAGNOSIS — Z82.41 FAMILY HISTORY OF SUDDEN CARDIAC DEATH: ICD-10-CM

## 2022-03-24 DIAGNOSIS — R07.9 CHEST PAIN, UNSPECIFIED TYPE: ICD-10-CM

## 2022-03-24 PROCEDURE — 99214 OFFICE O/P EST MOD 30 MIN: CPT | Performed by: NURSE PRACTITIONER

## 2022-03-24 PROCEDURE — 93246 EXT ECG>7D<15D RECORDING: CPT

## 2022-03-24 RX ORDER — ALBUTEROL SULFATE 90 UG/1
2 AEROSOL, METERED RESPIRATORY (INHALATION) EVERY 6 HOURS PRN
COMMUNITY

## 2022-03-24 RX ORDER — EPINEPHRINE 0.3 MG/.3ML
0.3 INJECTION SUBCUTANEOUS AS NEEDED
COMMUNITY

## 2022-03-24 NOTE — PROGRESS NOTES
[facility] Heart Monitor Documentation    Laura Weathers  1980  [id number]  03/24/22      [] ZIO XT Patch  Model [id number] Prescribed for 14 Days    · Serial Number: (N + 9 Digits) [id number]   · Apply-By Date on Box: [id number]  · USPS Tracking Number: 9202 [id number] [id number]  · USPS Tracking        [] [employer]ian MINI PLUS Mobile Cardiac Telemetry  Model BGMINIPLUS Prescribed for N/A Days    · Serial Number: (BGM + 7 Digits) BGM  · Shipped-By Date on Box:   · UPS Tracking Number: [id number]  · UPS Tracking      [] [employer] MINI Holter Monitor  Model BGMINIEL Prescribed for N/A Days    · Serial Number: (7 Digits)   · Shipped-By Date on Box:   · UPS Tracking Number: [id number]  · UPS Tracking        This monitor was applied to the patient's chest and checked for proper functioning.  Ms. Laura Weathers was instructed in the proper use of this monitor.  She was given the opportunity to ask questions and left the office with the device 's instruction manual.    Molly German MA, 10:22 EDT, 03/24/22                  [facility]

## 2022-04-07 ENCOUNTER — TELEPHONE (OUTPATIENT)
Dept: CARDIOLOGY | Facility: HOSPITAL | Age: 42
End: 2022-04-07

## 2022-04-07 NOTE — TELEPHONE ENCOUNTER
----- Message from Kym Floyd CMA sent at 4/7/2022  8:39 AM EDT -----  Patient called and states that she has additional information regarding her family hx of her sister who passed from a heart attack. She states that she got the autopsy report that stated that her sister had plaque buildup. Patient was wondering if knowing this information would would change any testing. Patient was advised that a stress and echo was ordered so this information would show valves and arteries.

## 2022-04-08 ENCOUNTER — APPOINTMENT (OUTPATIENT)
Dept: CARDIOLOGY | Facility: HOSPITAL | Age: 42
End: 2022-04-08

## 2022-04-08 ENCOUNTER — HOSPITAL ENCOUNTER (OUTPATIENT)
Dept: CARDIOLOGY | Facility: HOSPITAL | Age: 42
End: 2022-04-08

## 2022-04-14 ENCOUNTER — HOSPITAL ENCOUNTER (OUTPATIENT)
Dept: CARDIOLOGY | Facility: HOSPITAL | Age: 42
Discharge: HOME | End: 2022-04-14

## 2022-04-14 VITALS
WEIGHT: 280 LBS | HEIGHT: 66 IN | DIASTOLIC BLOOD PRESSURE: 104 MMHG | SYSTOLIC BLOOD PRESSURE: 143 MMHG | BODY MASS INDEX: 45 KG/M2

## 2022-04-14 VITALS — BODY MASS INDEX: 45 KG/M2 | HEIGHT: 66 IN | WEIGHT: 279.98 LBS | HEART RATE: 101 BPM

## 2022-04-14 DIAGNOSIS — R00.2 PALPITATIONS: ICD-10-CM

## 2022-04-14 DIAGNOSIS — R07.9 CHEST PAIN, UNSPECIFIED TYPE: ICD-10-CM

## 2022-04-14 DIAGNOSIS — Z82.41 FAMILY HISTORY OF SUDDEN CARDIAC DEATH: ICD-10-CM

## 2022-04-14 DIAGNOSIS — I10 HYPERTENSION, UNSPECIFIED TYPE: ICD-10-CM

## 2022-04-14 DIAGNOSIS — R06.09 DOE (DYSPNEA ON EXERTION): ICD-10-CM

## 2022-04-14 LAB
BH CV ECHO MEAS - AO MAX PG (FULL): 0.37 MMHG
BH CV ECHO MEAS - AO MAX PG: 8.2 MMHG
BH CV ECHO MEAS - AO MEAN PG (FULL): 0.52 MMHG
BH CV ECHO MEAS - AO MEAN PG: 4.2 MMHG
BH CV ECHO MEAS - AO ROOT AREA (BSA CORRECTED): 1.3
BH CV ECHO MEAS - AO ROOT AREA: 6.6 CM^2
BH CV ECHO MEAS - AO ROOT DIAM: 2.9 CM
BH CV ECHO MEAS - AO V2 MAX: 143.3 CM/SEC
BH CV ECHO MEAS - AO V2 MEAN: 95.5 CM/SEC
BH CV ECHO MEAS - AO V2 VTI: 32.9 CM
BH CV ECHO MEAS - ASC AORTA: 3.4 CM
BH CV ECHO MEAS - AVA(I,A): 3.3 CM^2
BH CV ECHO MEAS - AVA(I,D): 3.3 CM^2
BH CV ECHO MEAS - AVA(V,A): 3.2 CM^2
BH CV ECHO MEAS - AVA(V,D): 3.2 CM^2
BH CV ECHO MEAS - BSA(HAYCOCK): 2.5 M^2
BH CV ECHO MEAS - BSA: 2.3 M^2
BH CV ECHO MEAS - BZI_BMI: 45.2 KILOGRAMS/M^2
BH CV ECHO MEAS - BZI_METRIC_HEIGHT: 167.6 CM
BH CV ECHO MEAS - BZI_METRIC_WEIGHT: 127 KG
BH CV ECHO MEAS - EDV(CUBED): 82 ML
BH CV ECHO MEAS - EDV(MOD-SP2): 36 ML
BH CV ECHO MEAS - EDV(MOD-SP4): 75 ML
BH CV ECHO MEAS - EDV(TEICH): 85.1 ML
BH CV ECHO MEAS - EF(CUBED): 64 %
BH CV ECHO MEAS - EF(MOD-BP): 55 %
BH CV ECHO MEAS - EF(MOD-SP2): 38.9 %
BH CV ECHO MEAS - EF(MOD-SP4): 69.3 %
BH CV ECHO MEAS - EF(TEICH): 55.8 %
BH CV ECHO MEAS - ESV(CUBED): 29.5 ML
BH CV ECHO MEAS - ESV(MOD-SP2): 22 ML
BH CV ECHO MEAS - ESV(MOD-SP4): 23 ML
BH CV ECHO MEAS - ESV(TEICH): 37.6 ML
BH CV ECHO MEAS - FS: 28.9 %
BH CV ECHO MEAS - IVS/LVPW: 1
BH CV ECHO MEAS - IVSD: 1.2 CM
BH CV ECHO MEAS - LA DIMENSION: 3.4 CM
BH CV ECHO MEAS - LA/AO: 1.2
BH CV ECHO MEAS - LAD MAJOR: 5.1 CM
BH CV ECHO MEAS - LAT PEAK E' VEL: 13 CM/SEC
BH CV ECHO MEAS - LATERAL E/E' RATIO: 6.1
BH CV ECHO MEAS - LV DIASTOLIC VOL/BSA (35-75): 32.5 ML/M^2
BH CV ECHO MEAS - LV IVRT: 0.1 SEC
BH CV ECHO MEAS - LV MASS(C)D: 182.2 GRAMS
BH CV ECHO MEAS - LV MASS(C)DI: 78.9 GRAMS/M^2
BH CV ECHO MEAS - LV MAX PG: 7.8 MMHG
BH CV ECHO MEAS - LV MEAN PG: 3.7 MMHG
BH CV ECHO MEAS - LV SYSTOLIC VOL/BSA (12-30): 10 ML/M^2
BH CV ECHO MEAS - LV V1 MAX: 140.1 CM/SEC
BH CV ECHO MEAS - LV V1 MEAN: 86.2 CM/SEC
BH CV ECHO MEAS - LV V1 VTI: 33.4 CM
BH CV ECHO MEAS - LVIDD: 4.3 CM
BH CV ECHO MEAS - LVIDS: 3.1 CM
BH CV ECHO MEAS - LVLD AP2: 7.1 CM
BH CV ECHO MEAS - LVLD AP4: 7.4 CM
BH CV ECHO MEAS - LVLS AP2: 5.7 CM
BH CV ECHO MEAS - LVLS AP4: 6.6 CM
BH CV ECHO MEAS - LVOT AREA (M): 3.1 CM^2
BH CV ECHO MEAS - LVOT AREA: 3.3 CM^2
BH CV ECHO MEAS - LVOT DIAM: 2 CM
BH CV ECHO MEAS - LVPWD: 1.2 CM
BH CV ECHO MEAS - MED PEAK E' VEL: 6.8 CM/SEC
BH CV ECHO MEAS - MEDIAL E/E' RATIO: 11.7
BH CV ECHO MEAS - MV A MAX VEL: 80 CM/SEC
BH CV ECHO MEAS - MV DEC TIME: 0.09 SEC
BH CV ECHO MEAS - MV E MAX VEL: 81.4 CM/SEC
BH CV ECHO MEAS - MV E/A: 1
BH CV ECHO MEAS - MV MAX PG: 3.3 MMHG
BH CV ECHO MEAS - MV MEAN PG: 1.7 MMHG
BH CV ECHO MEAS - MV V2 MAX: 91.1 CM/SEC
BH CV ECHO MEAS - MV V2 MEAN: 62 CM/SEC
BH CV ECHO MEAS - MV V2 VTI: 25.1 CM
BH CV ECHO MEAS - MVA(VTI): 4.4 CM^2
BH CV ECHO MEAS - PA ACC SLOPE: 361.1 CM/SEC^2
BH CV ECHO MEAS - PA ACC TIME: 0.21 SEC
BH CV ECHO MEAS - PA MAX PG: 3.4 MMHG
BH CV ECHO MEAS - PA PR(ACCEL): -14.5 MMHG
BH CV ECHO MEAS - PA V2 MAX: 89.9 CM/SEC
BH CV ECHO MEAS - RVDD: 1.9 CM
BH CV ECHO MEAS - SI(AO): 94.5 ML/M^2
BH CV ECHO MEAS - SI(CUBED): 22.7 ML/M^2
BH CV ECHO MEAS - SI(LVOT): 47.7 ML/M^2
BH CV ECHO MEAS - SI(MOD-SP2): 6.1 ML/M^2
BH CV ECHO MEAS - SI(MOD-SP4): 22.5 ML/M^2
BH CV ECHO MEAS - SI(TEICH): 20.6 ML/M^2
BH CV ECHO MEAS - SV(AO): 218.2 ML
BH CV ECHO MEAS - SV(CUBED): 52.5 ML
BH CV ECHO MEAS - SV(LVOT): 110.1 ML
BH CV ECHO MEAS - SV(MOD-SP2): 14 ML
BH CV ECHO MEAS - SV(MOD-SP4): 52 ML
BH CV ECHO MEAS - SV(TEICH): 47.5 ML
BH CV ECHO MEAS - TAPSE (>1.6): 2.1 CM
BH CV ECHO MEASUREMENTS AVERAGE E/E' RATIO: 8.22
BH CV VAS BP RIGHT ARM: NORMAL MMHG
BH CV XLRA - RV BASE: 2.8 CM
BH CV XLRA - RV LENGTH: 6 CM
BH CV XLRA - RV MID: 2.9 CM
BH CV XLRA - TDI S': 10.7 CM/SEC
LEFT ATRIUM VOLUME INDEX: 14.7 ML/M^2
LEFT ATRIUM VOLUME: 34 ML
MAXIMAL PREDICTED HEART RATE: 176 BPM
STRESS TARGET HR: 150 BPM

## 2022-04-14 PROCEDURE — 93306 TTE W/DOPPLER COMPLETE: CPT | Performed by: INTERNAL MEDICINE

## 2022-04-14 PROCEDURE — 93306 TTE W/DOPPLER COMPLETE: CPT

## 2022-04-14 PROCEDURE — 93018 CV STRESS TEST I&R ONLY: CPT | Performed by: INTERNAL MEDICINE

## 2022-04-14 PROCEDURE — 93017 CV STRESS TEST TRACING ONLY: CPT

## 2022-04-15 ENCOUNTER — TELEPHONE (OUTPATIENT)
Dept: CARDIOLOGY | Facility: HOSPITAL | Age: 42
End: 2022-04-15

## 2022-04-15 LAB
BH CV STRESS BP STAGE 1: NORMAL
BH CV STRESS BP STAGE 2: NORMAL
BH CV STRESS DURATION MIN STAGE 1: 3
BH CV STRESS DURATION MIN STAGE 2: 2
BH CV STRESS DURATION SEC STAGE 1: 0
BH CV STRESS DURATION SEC STAGE 2: 19
BH CV STRESS GRADE STAGE 1: 10
BH CV STRESS GRADE STAGE 2: 12
BH CV STRESS HR STAGE 1: 142
BH CV STRESS HR STAGE 2: 166
BH CV STRESS METS STAGE 1: 5
BH CV STRESS METS STAGE 2: 7.5
BH CV STRESS O2 STAGE 1: 97
BH CV STRESS O2 STAGE 2: 98
BH CV STRESS PROTOCOL 1: NORMAL
BH CV STRESS RECOVERY BP: NORMAL MMHG
BH CV STRESS RECOVERY HR: 115 BPM
BH CV STRESS RECOVERY O2: 98 %
BH CV STRESS SPEED STAGE 1: 1.7
BH CV STRESS SPEED STAGE 2: 2.5
BH CV STRESS STAGE 1: 1
BH CV STRESS STAGE 2: 2
MAXIMAL PREDICTED HEART RATE: 176 BPM
PERCENT MAX PREDICTED HR: 94.32 %
STRESS BASELINE BP: NORMAL MMHG
STRESS BASELINE HR: 100 BPM
STRESS O2 SAT REST: 98 %
STRESS PERCENT HR: 111 %
STRESS POST ESTIMATED WORKLOAD: 7 METS
STRESS POST EXERCISE DUR MIN: 5 MIN
STRESS POST EXERCISE DUR SEC: 19 SEC
STRESS POST O2 SAT PEAK: 98 %
STRESS POST PEAK BP: NORMAL MMHG
STRESS POST PEAK HR: 166 BPM
STRESS TARGET HR: 150 BPM

## 2022-04-21 DIAGNOSIS — R00.2 PALPITATION: Chronic | ICD-10-CM

## 2022-04-21 DIAGNOSIS — F33.0 MILD EPISODE OF RECURRENT DEPRESSIVE DISORDER: Chronic | ICD-10-CM

## 2022-04-21 DIAGNOSIS — I10 ESSENTIAL HYPERTENSION: Chronic | ICD-10-CM

## 2022-04-21 PROCEDURE — 93248 EXT ECG>7D<15D REV&INTERPJ: CPT | Performed by: INTERNAL MEDICINE

## 2022-04-21 RX ORDER — CITALOPRAM 20 MG/1
20 TABLET ORAL DAILY
Qty: 30 TABLET | Refills: 5 | Status: CANCELLED | OUTPATIENT
Start: 2022-04-21

## 2022-04-21 RX ORDER — LOSARTAN POTASSIUM AND HYDROCHLOROTHIAZIDE 12.5; 5 MG/1; MG/1
1 TABLET ORAL DAILY
Qty: 30 TABLET | Refills: 5 | Status: CANCELLED | OUTPATIENT
Start: 2022-04-21

## 2022-04-21 RX ORDER — METOPROLOL SUCCINATE 25 MG/1
25 TABLET, EXTENDED RELEASE ORAL DAILY
Qty: 30 TABLET | Refills: 5 | Status: CANCELLED | OUTPATIENT
Start: 2022-04-21

## 2022-04-25 ENCOUNTER — TELEPHONE (OUTPATIENT)
Dept: FAMILY MEDICINE CLINIC | Facility: CLINIC | Age: 42
End: 2022-04-25

## 2022-04-25 NOTE — PROGRESS NOTES
"[facility]  Heart and Valve Center      Mode of Visit: Video  Location of patient: home  You have chosen to receive care through a telehealth visit.  Does the patient consent to use a video/audio connection for your medical care today? Yes  The visit included audio and video interaction. No technical issues occurred during this visit.     Chief Complaint  Follow-up and Palpitations    History of Present Illness    Laura Weathers is a 42 y.o. female with anxiety, hyperlipidemia and hypertension who presents today as a telemedicine follow-up on palpitations. She was placed in an extended Holter monitor which was negative for arrhythmias.  She did have some symptomatic PACs that were rare.  Stress test was negative for ischemia, poor exercise capacity noted.  Echo was also unremarkable except for some borderline LVH. Palpitations have seemed to improve. Denies chest pain. She does have exertional dyspnea but reports that she is less afraid of the treadmill now and is exercising again. She got her sister's autopsy report which is described below. She reports that SBP is usually controlled, yesterday it was 115     Cardiac risks: Obesity, hypertension, hyperlipidemia, family history (sister had SCD at age 28-autopsy report showed 100% left main thrombus, 50% LAD and RCA stenosis)    Objective     Vital Signs:   Vitals:    04/26/22 1315   BP: 135/89   Pulse: 81   Weight: 125 kg (275 lb)   Height: 167.6 cm (66\")     Body mass index is 44.39 kg/m².    Virtual Visit Physical Exam  Physical Exam  Constitutional:       Appearance: Normal appearance.   HENT:      Head: Normocephalic.   Pulmonary:      Effort: Pulmonary effort is normal. No respiratory distress.   Neurological:      Mental Status: She is alert and oriented to person, place, and time.   Psychiatric:         Mood and Affect: Mood normal.         Behavior: Behavior normal.         Thought Content: Thought content normal.              Result " Review  Data Reviewed:{ Labs  Result Review  Imaging  Med Tab  Media :23}   Adult Transthoracic Echo Complete W/ Cont if Necessary Per Protocol (2022 15:00)  Treadmill Stress Test (2022 14:13)  Holter Monitor - 72 Hour Up To 15 Days (2022 10:59)           Assessment and Plan {CC Problem List  Visit Diagnosis  ROS  Review (Popup)  Health Maintenance  Quality  BestPractice  Medications  SmartSets  SnapShot Encounters  Media :23}   1. Family history of premature CAD  Sister  of thrombus to the left main at age 28. She also had some atheroma/plaque in the LAD and RCA. Will do CCS. If elevated, she will need statin and ASA  Cardiac prevention encouraged including healthy diet, weight loss, exercise  - CT Cardiac Calcium Score Without Dye; Future    2. Hypertension, unspecified type  Continue close monitoring, especially with borderline LVH  Call if SBP consistently 130 or greater    3. Palpitations  No arrhythmias on monitor. Symptoms have improved  Continue metoprolol     4. SIEGEL (dyspnea on exertion)  GXT and echo unremarkable  Continue regular aerobic exercise and call if symptoms persist        Follow Up {Instructions Charge Capture  Follow-up Communications :23}   Return if symptoms worsen or fail to improve.    Patient was given instructions and counseling regarding her condition or for health maintenance advice. Please see specific information pulled into the AVS if appropriate.  Advised to call the Heart and Valve Center with any questions, concerns, or worsening symptoms.    Dictated Utilizing Dragon Dictation

## 2022-04-25 NOTE — TELEPHONE ENCOUNTER
PT STATED THAT SHE HAS BEEN TRYING TO GET THE FOLLOWING MEDS REFILLED SINCE 4/21/2022:    CELEXA  LOSARTAN  TOPROL    PT STATED THAT SHE IS OUT OF MEDS

## 2022-04-26 ENCOUNTER — TELEMEDICINE (OUTPATIENT)
Dept: CARDIOLOGY | Facility: HOSPITAL | Age: 42
End: 2022-04-26

## 2022-04-26 VITALS
DIASTOLIC BLOOD PRESSURE: 89 MMHG | BODY MASS INDEX: 44.2 KG/M2 | WEIGHT: 275 LBS | SYSTOLIC BLOOD PRESSURE: 135 MMHG | HEIGHT: 66 IN | HEART RATE: 81 BPM

## 2022-04-26 DIAGNOSIS — Z82.49 FAMILY HISTORY OF PREMATURE CAD: Primary | ICD-10-CM

## 2022-04-26 DIAGNOSIS — I10 ESSENTIAL HYPERTENSION: Chronic | ICD-10-CM

## 2022-04-26 DIAGNOSIS — R00.2 PALPITATIONS: ICD-10-CM

## 2022-04-26 DIAGNOSIS — R00.2 PALPITATION: Chronic | ICD-10-CM

## 2022-04-26 DIAGNOSIS — R06.09 DOE (DYSPNEA ON EXERTION): ICD-10-CM

## 2022-04-26 DIAGNOSIS — I10 HYPERTENSION, UNSPECIFIED TYPE: ICD-10-CM

## 2022-04-26 DIAGNOSIS — F33.0 MILD EPISODE OF RECURRENT DEPRESSIVE DISORDER: Chronic | ICD-10-CM

## 2022-04-26 PROCEDURE — 99213 OFFICE O/P EST LOW 20 MIN: CPT | Performed by: NURSE PRACTITIONER

## 2022-04-26 RX ORDER — LOSARTAN POTASSIUM AND HYDROCHLOROTHIAZIDE 12.5; 5 MG/1; MG/1
1 TABLET ORAL DAILY
Qty: 30 TABLET | Refills: 5 | Status: SHIPPED | OUTPATIENT
Start: 2022-04-26 | End: 2022-04-27 | Stop reason: SDUPTHER

## 2022-04-26 RX ORDER — CITALOPRAM 20 MG/1
20 TABLET ORAL DAILY
Qty: 30 TABLET | Refills: 5 | Status: SHIPPED | OUTPATIENT
Start: 2022-04-26 | End: 2022-10-05

## 2022-04-26 RX ORDER — METOPROLOL SUCCINATE 25 MG/1
25 TABLET, EXTENDED RELEASE ORAL DAILY
Qty: 30 TABLET | Refills: 5 | Status: SHIPPED | OUTPATIENT
Start: 2022-04-26 | End: 2022-10-05

## 2022-04-27 DIAGNOSIS — I10 ESSENTIAL HYPERTENSION: Chronic | ICD-10-CM

## 2022-04-27 RX ORDER — LOSARTAN POTASSIUM AND HYDROCHLOROTHIAZIDE 12.5; 5 MG/1; MG/1
1 TABLET ORAL DAILY
Qty: 30 TABLET | Refills: 5 | Status: SHIPPED | OUTPATIENT
Start: 2022-04-27

## 2022-05-03 ENCOUNTER — TELEMEDICINE (OUTPATIENT)
Dept: SLEEP MEDICINE | Facility: HOSPITAL | Age: 42
End: 2022-05-03

## 2022-05-03 DIAGNOSIS — E66.01 MORBID (SEVERE) OBESITY DUE TO EXCESS CALORIES: ICD-10-CM

## 2022-05-03 DIAGNOSIS — R06.83 SNORING: Primary | ICD-10-CM

## 2022-05-03 DIAGNOSIS — G47.33 OBSTRUCTIVE SLEEP APNEA, ADULT: ICD-10-CM

## 2022-05-03 PROCEDURE — 99203 OFFICE O/P NEW LOW 30 MIN: CPT | Performed by: INTERNAL MEDICINE

## 2022-05-22 ENCOUNTER — HOSPITAL ENCOUNTER (OUTPATIENT)
Dept: CT IMAGING | Facility: HOSPITAL | Age: 42
Discharge: HOME | End: 2022-05-22
Admitting: NURSE PRACTITIONER

## 2022-05-22 DIAGNOSIS — Z82.49 FAMILY HISTORY OF PREMATURE CAD: ICD-10-CM

## 2022-05-22 PROCEDURE — 75571 CT HRT W/O DYE W/CA TEST: CPT

## 2022-05-23 NOTE — PROGRESS NOTES
Chief Complaint  Snoring and nonrestorative sleep    Subjective         Laura Weathers presents to [facility]EP [facility]IC[facility] for the evaluation of snoring and nonrestorative sleep.  Her primary care provider is Christiane ZEPEDA.  You have chosen to receive care through a telehealth visit.  Do you consent to use a video/audio connection for your medical care today? Yes  History of Present Illness  Patient states she has had snoring noted for 3 years.  She feels very exhausted during the day.  She has some chronic sinus problems and allergies and has had surgery twice.  She has occasional palpitations and sometimes awakens with a fast heart rate.  She has a family history of early coronary artery disease.    She denies any noted apneas.  She has awaken gasping for breath.  She is sometimes rested on awakening in the morning although often not.  She denies having morning headaches.    She denies awakening with a dry mouth.  She has broken her nose previously and had a deviated septum.  She has proper getting air through her nose.  Denies falling asleep if sitting quietly during the day.  She denies problems driving.  She denies kicking or jerking her legs at night.  She does have occasional back pain that bothers her.    She goes to bed between 10:11 PM.  She will fall asleep in 30 to 40 minutes.  She awakens 3-4 times during the night.  She gets 6 hours of sleep and is only occasionally rested.  She has had hypertension known for 14 years.  She has a history of tachycardia.  She denies any history of diabetes.    Past medical history:    Allergies: She is allergic to trees, grass, feathers, dust, mold, shrimp    Habits: Smoking: She smoked a pack per day for 4 years but quit 20 years ago    Ethanol: Without    Caffeine: She has 1 to 2 cups of coffee in the occasionally has a caffeine free cola.    Medical illnesses: She has a history of thyroid cancer, hypertension, allergic  rhinitis.    Medications:  albuterol sulfate  (90 Base) MCG/ACT inhaler    Azelastine HCl 137 MCG/SPRAY solution    citalopram (CeleXA) 20 MG tablet    Cyanocobalamin (VITAMIN B-12 PO)    EPINEPHrine (EPIPEN) 0.3 MG/0.3ML solution auto-injector injection    fexofenadine (ALLEGRA) 180 MG tablet    fluticasone (FLONASE) 50 MCG/ACT nasal spray    Levoxyl 150 MCG tablet    losartan-hydrochlorothiazide (HYZAAR) 50-12.5 MG per tablet    metoprolol succinate XL (Toprol XL) 25 MG 24 hr tablet    norethindrone (Giovanna) 0.35 MG tablet    potassium chloride (KLOR-CON) 20 MEQ packet    traZODone (DESYREL) 50 MG tablet    vitamin D (ERGOCALCIFEROL) 1.25 MG (61344 UT) capsule capsule    Surgeries: Had sinus surgery 2 occasions, thyroid surgery and     Family history: Positives include arrhythmias, hypertension, heart disease, stroke, celiac disease    Review of systems: Positives in include fatigue, 25 pounds weight gain over 6 months, nasal congestion, sinus drainage, postnasal drip, palpitations, nausea, hypothyroidism, back pain, joint pain, allergies, dizziness, headaches, dysphoria.  Other systems reviewed and reported as negative.    Varina score is 5/24  Objective   Vital Signs:  There were no vitals taken for this visit.        Physical Exam patient appears to be awake and alert.  She does not appear to be in acute respiratory distress.  Her stated weight is 275 pounds.  Her height 66 inches body mass index is 44.  Result Review :         Assessment and Plan   Diagnoses and all orders for this visit:    1. Snoring (Primary)  -     Home Sleep Study; Future    2. Obstructive sleep apnea, adult  -     Home Sleep Study; Future    3. Morbid (severe) obesity due to excess calories (HCC)  -     Ambulatory Referral to Nutrition Services    Patient has a history of snoring and nonrestorative sleep.  She gives an excellent story for obstructive sleep apnea.  We will plan to proceed to home sleep testing.  We have  discussed potential therapies including CPAP, weight control, oral appliance, and surgery.  We have discussed the long-term consequences of untreated obstructive apnea.  These include hypertension, diabetes, heart disease, stroke, and dementia.  She is encouraged to lose weight.  She is interested in having discussion with the dietitian.  She is encouraged to avoid alcohol and sedatives close to bedtime.  She is encouraged to practice lateral position sleep.  We will see her back after her study.           I spent 35 minutes caring for Laura on this date of service. This time includes time spent by me in the following activities:obtaining and/or reviewing a separately obtained history, performing a medically appropriate examination and/or evaluation , counseling and educating the patient/family/caregiver, ordering medications, tests, or procedures and documenting information in the medical record  Follow Up   Return for Follow up after study, Next scheduled follow-up, Video visit.  Patient was given instructions and counseling regarding her condition or for health maintenance advice. Please see specific information pulled into the AVS if appropriate.   Yogesh Guo MD [facility]  Sleep Medicine  Pulmonary and Critical Care Medicine

## 2022-06-02 NOTE — TELEPHONE ENCOUNTER
Writer spoke to pt to let him know recommendations for Lasix. Again let pt know MD Stephens feels Torsemide is a better fit for blood pressure control and swelling issues. Pt still wants to try a different water pill. Advised pt to continue checking weights, blood pressures, and swelling at home. Lab check on June 15th. Pt needs to call writer sooner if issues with bp and weights.    Yes I agree. We can talk about further testing if needed after we see these results

## 2022-06-09 ENCOUNTER — APPOINTMENT (OUTPATIENT)
Dept: NUTRITION | Facility: HOSPITAL | Age: 42
End: 2022-06-09

## 2022-07-11 ENCOUNTER — LAB (OUTPATIENT)
Dept: LAB | Facility: HOSPITAL | Age: 42
End: 2022-07-11

## 2022-07-11 ENCOUNTER — OFFICE VISIT (OUTPATIENT)
Dept: FAMILY MEDICINE CLINIC | Facility: CLINIC | Age: 42
End: 2022-07-11

## 2022-07-11 VITALS
SYSTOLIC BLOOD PRESSURE: 112 MMHG | HEIGHT: 66 IN | DIASTOLIC BLOOD PRESSURE: 78 MMHG | WEIGHT: 279 LBS | OXYGEN SATURATION: 98 % | BODY MASS INDEX: 44.84 KG/M2 | RESPIRATION RATE: 14 BRPM | HEART RATE: 98 BPM | TEMPERATURE: 98.4 F

## 2022-07-11 DIAGNOSIS — Z11.59 NEED FOR HEPATITIS C SCREENING TEST: ICD-10-CM

## 2022-07-11 DIAGNOSIS — E78.00 PURE HYPERCHOLESTEROLEMIA: ICD-10-CM

## 2022-07-11 DIAGNOSIS — L98.9 SKIN LESION: ICD-10-CM

## 2022-07-11 DIAGNOSIS — Z76.0 MEDICATION REFILL: ICD-10-CM

## 2022-07-11 DIAGNOSIS — E78.49 OTHER HYPERLIPIDEMIA: Chronic | ICD-10-CM

## 2022-07-11 DIAGNOSIS — Z00.00 ANNUAL PHYSICAL EXAM: Primary | ICD-10-CM

## 2022-07-11 DIAGNOSIS — J30.9 ALLERGIC RHINITIS, UNSPECIFIED SEASONALITY, UNSPECIFIED TRIGGER: ICD-10-CM

## 2022-07-11 DIAGNOSIS — Z12.31 ENCOUNTER FOR SCREENING MAMMOGRAM FOR MALIGNANT NEOPLASM OF BREAST: ICD-10-CM

## 2022-07-11 DIAGNOSIS — I10 PRIMARY HYPERTENSION: Chronic | ICD-10-CM

## 2022-07-11 PROBLEM — U07.1 COVID-19: Status: ACTIVE | Noted: 2022-07-11

## 2022-07-11 LAB
CHOLEST SERPL-MCNC: 184 MG/DL (ref 0–200)
HCV AB SER DONR QL: NORMAL
HDLC SERPL-MCNC: 31 MG/DL (ref 40–60)
LDLC SERPL CALC-MCNC: 130 MG/DL (ref 0–100)
LDLC/HDLC SERPL: 4.12 {RATIO}
TRIGL SERPL-MCNC: 127 MG/DL (ref 0–150)
VLDLC SERPL-MCNC: 23 MG/DL (ref 5–40)

## 2022-07-11 PROCEDURE — 86803 HEPATITIS C AB TEST: CPT

## 2022-07-11 PROCEDURE — 80061 LIPID PANEL: CPT

## 2022-07-11 PROCEDURE — 99396 PREV VISIT EST AGE 40-64: CPT | Performed by: NURSE PRACTITIONER

## 2022-07-11 RX ORDER — AZELASTINE HYDROCHLORIDE 137 UG/1
1 SPRAY, METERED NASAL DAILY PRN
Qty: 30 ML | Refills: 3 | Status: SHIPPED | OUTPATIENT
Start: 2022-07-11 | End: 2022-11-02

## 2022-07-11 NOTE — PROGRESS NOTES
Follow Up Office Note     Patient Name: Laura Weathers  : 1980   MRN: [id number]     Chief Complaint:    Chief Complaint   Patient presents with   • Annual Exam       History of Present Illness: Laura Weathers is a 42 y.o. female who presents today for annual physical exam. Patient states that she feels well overall.  Patient has chronic hypertension which has been stable and controlled on current medications.    Comprehensive Metabolic Panel (2022 10:21)  CBC Auto Differential (2022 10:21)  TSH (2022 10:21)    Subjective      Review of Systems:   Review of Systems   Constitutional: Negative.    HENT: Negative.         Dental exam up to date.   Eyes: Negative.         Eye exam up to date.   Respiratory: Negative.    Cardiovascular: Negative.    Gastrointestinal: Negative.    Genitourinary: Negative.    Musculoskeletal: Negative.    Skin: Positive for color change.   Allergic/Immunologic: Positive for environmental allergies.   Neurological: Negative.    Psychiatric/Behavioral: Negative.         Past Medical History:   Past Medical History:   Diagnosis Date   • Allergic    • Follicular thyroid cancer (HCC) 2017    T3N0M0 follicular thyroid cancer s/p two stage thyroidectomy and I -131 remnant ablation   • Hypertension    • Postoperative hypothyroidism 2013   • Preeclampsia     delivered, with a postpartum complication     LMP: 6/10/22  Last mammogram 2019  Last pap 2016    Medications:     Current Outpatient Medications:   •  albuterol sulfate  (90 Base) MCG/ACT inhaler, Inhale 2 puffs Every 6 (Six) Hours As Needed for Wheezing., Disp: , Rfl:   •  Azelastine HCl 137 MCG/SPRAY solution, 1 spray into the nostril(s) as directed by provider Daily As Needed (allergies)., Disp: 30 mL, Rfl: 3  •  citalopram (CeleXA) 20 MG tablet, Take 1 tablet by mouth Daily., Disp: 30 tablet, Rfl: 5  •  Cyanocobalamin (VITAMIN B-12 PO), Take 1,000 mg by mouth Every Other Day.,  "Disp: , Rfl:   •  EPINEPHrine (EPIPEN) 0.3 MG/0.3ML solution auto-injector injection, 0.3 mg As Needed., Disp: , Rfl:   •  fexofenadine (ALLEGRA) 180 MG tablet, Take 180 mg by mouth Daily., Disp: , Rfl:   •  fluticasone (FLONASE) 50 MCG/ACT nasal spray, 1 spray by Each Nare route Daily., Disp: 16 g, Rfl: 3  •  Levoxyl 150 MCG tablet, Take 1 tablet by mouth Daily., Disp: 90 tablet, Rfl: 3  •  losartan-hydrochlorothiazide (HYZAAR) 50-12.5 MG per tablet, Take 1 tablet by mouth Daily., Disp: 30 tablet, Rfl: 5  •  metoprolol succinate XL (Toprol XL) 25 MG 24 hr tablet, Take 1 tablet by mouth Daily., Disp: 30 tablet, Rfl: 5  •  norethindrone (Giovanna) 0.35 MG tablet, Take 1 tablet by mouth Daily., Disp: 28 tablet, Rfl: 11  •  potassium chloride (KLOR-CON) 20 MEQ packet, Take 20 mEq by mouth Daily., Disp: , Rfl:   •  traZODone (DESYREL) 50 MG tablet, 1-2 to 1 tab qhs prn insomnia, Disp: 30 tablet, Rfl: 3  •  vitamin D (ERGOCALCIFEROL) 1.25 MG (76769 UT) capsule capsule, Take 1 capsule by mouth 1 (One) Time Per Week., Disp: 5 capsule, Rfl: 3    Allergies:   Allergies   Allergen Reactions   • Prednisone Hives   • Adhesive Tape Rash       PHQ-2/PHQ-9 Depression Screening 3/17/2022   Retired PHQ-9 Total Score -   Retired Total Score -   Little Interest or Pleasure in Doing Things 0-->not at all   Feeling Down, Depressed or Hopeless 0-->not at all   PHQ-9: Brief Depression Severity Measure Score 0       Objective     Physical Exam:  Vital Signs:   Vitals:    07/11/22 1022   BP: 112/78   BP Location: Left arm   Patient Position: Sitting   Cuff Size: Adult   Pulse: 98   Resp: 14   Temp: 98.4 °F (36.9 °C)   TempSrc: Temporal   SpO2: 98%   Weight: 127 kg (279 lb)   Height: 167.6 cm (65.98\")     Body mass index is 45.05 kg/m².     Physical Exam  Vitals and nursing note reviewed. Exam conducted with a chaperone present.   Constitutional:       General: She is not in acute distress.     Appearance: Normal appearance. She is " well-developed and well-groomed. She is not ill-appearing, toxic-appearing or diaphoretic.   HENT:      Head: Normocephalic and atraumatic.      Right Ear: Tympanic membrane, ear canal and external ear normal.      Left Ear: Tympanic membrane, ear canal and external ear normal.      Nose: Nose normal.      Mouth/Throat:      Lips: Pink.      Mouth: Mucous membranes are moist.      Pharynx: Oropharynx is clear. Uvula midline. No posterior oropharyngeal erythema.   Neck:      Thyroid: No thyroid mass, thyromegaly or thyroid tenderness.   Cardiovascular:      Rate and Rhythm: Normal rate and regular rhythm.      Pulses: Normal pulses.      Heart sounds: Normal heart sounds, S1 normal and S2 normal. No murmur heard.  Pulmonary:      Effort: Pulmonary effort is normal. No respiratory distress.      Breath sounds: Normal breath sounds.   Abdominal:      General: Bowel sounds are normal. There is no distension.      Palpations: Abdomen is soft.      Tenderness: There is no abdominal tenderness.   Musculoskeletal:         General: Normal range of motion.      Cervical back: Normal range of motion and neck supple.      Right lower leg: No edema.      Left lower leg: No edema.   Lymphadenopathy:      Cervical: No cervical adenopathy.   Skin:     General: Skin is warm and dry.      Capillary Refill: Capillary refill takes less than 2 seconds.      Findings: No rash.          Neurological:      General: No focal deficit present.      Mental Status: She is alert and oriented to person, place, and time.   Psychiatric:         Attention and Perception: Attention and perception normal.         Mood and Affect: Mood and affect normal.         Speech: Speech normal.         Behavior: Behavior normal. Behavior is cooperative.         Thought Content: Thought content normal.         Cognition and Memory: Cognition and memory normal.         Judgment: Judgment normal.         Assessment / Plan      Assessment/Plan:   Diagnoses and all  orders for this visit:    1. Annual physical exam (Primary)  -     Ambulatory Referral to Gynecology    2. Primary hypertension  Assessment & Plan:  Hypertension is improving with treatment.  Continue current treatment regimen.  Dietary sodium restriction.  Weight loss.  Regular aerobic exercise.  Continue current medications.  Ambulatory blood pressure monitoring.  Blood pressure will be reassessed at the next regular appointment.      3. Pure hypercholesterolemia  -     Lipid Panel; Future    4. Skin lesion  -     Ambulatory Referral to Dermatology    5. Allergic rhinitis, unspecified seasonality, unspecified trigger  -     Azelastine HCl 137 MCG/SPRAY solution; 1 spray into the nostril(s) as directed by provider Daily As Needed (allergies).  Dispense: 30 mL; Refill: 3    6. Medication refill  -     Azelastine HCl 137 MCG/SPRAY solution; 1 spray into the nostril(s) as directed by provider Daily As Needed (allergies).  Dispense: 30 mL; Refill: 3    7. Encounter for screening mammogram for malignant neoplasm of breast  -     Mammo Screening Digital Tomosynthesis Bilateral With CAD; Future    8. Need for hepatitis C screening test  -     Hepatitis C Antibody; Future       Follow Up:   PRN and at next scheduled appointment(s) with PCP.    Discussed the nature of the medical condition(s) risks, complications, implications, management, safe and proper use of medications. Encouraged medication compliance, and keeping scheduled follow up appointments with me and any other providers.      The patient is here for a health maintenance visit.  Currently, the patient consumes a regular diet and has no routine exercise regimen. Screening lab work is ordered.  Immunizations are declined today and vaccine education is provided.  Advice and education is given regarding nutrition, aerobic exercise, routine dental evaluations, routine eye exams, reproductive health, cardiovascular risk reduction, sunscreen use, self skin examination  (annual dermatology evaluations) and seat belt use (general overall safety).  Further recommendations after lab evaluation.  Annual wellness evaluations recommended.      DUANE Sawyer  [facility] [place]

## 2022-07-13 ENCOUNTER — HOSPITAL ENCOUNTER (OUTPATIENT)
Dept: MAMMOGRAPHY | Facility: HOSPITAL | Age: 42
Discharge: HOME | End: 2022-07-13
Admitting: NURSE PRACTITIONER

## 2022-07-13 DIAGNOSIS — Z12.31 ENCOUNTER FOR SCREENING MAMMOGRAM FOR MALIGNANT NEOPLASM OF BREAST: ICD-10-CM

## 2022-07-13 PROCEDURE — 77063 BREAST TOMOSYNTHESIS BI: CPT

## 2022-07-13 PROCEDURE — 77067 SCR MAMMO BI INCL CAD: CPT | Performed by: RADIOLOGY

## 2022-07-13 PROCEDURE — 77063 BREAST TOMOSYNTHESIS BI: CPT | Performed by: RADIOLOGY

## 2022-07-13 PROCEDURE — 77067 SCR MAMMO BI INCL CAD: CPT

## 2022-07-18 DIAGNOSIS — J30.9 ALLERGIC RHINITIS, UNSPECIFIED SEASONALITY, UNSPECIFIED TRIGGER: ICD-10-CM

## 2022-07-18 RX ORDER — FLUTICASONE PROPIONATE 50 MCG
SPRAY, SUSPENSION (ML) NASAL
Qty: 16 G | Refills: 2 | Status: SHIPPED | OUTPATIENT
Start: 2022-07-18 | End: 2022-10-05

## 2022-08-04 DIAGNOSIS — E55.9 VITAMIN D DEFICIENCY: ICD-10-CM

## 2022-08-04 RX ORDER — ERGOCALCIFEROL 1.25 MG/1
CAPSULE ORAL
Qty: 5 CAPSULE | Refills: 2 | Status: SHIPPED | OUTPATIENT
Start: 2022-08-04 | End: 2022-11-02

## 2022-08-04 NOTE — TELEPHONE ENCOUNTER
Rx Refill Note  Requested Prescriptions     Pending Prescriptions Disp Refills   • vitamin D (ERGOCALCIFEROL) 1.25 MG (56351 UT) capsule capsule [Pharmacy Med Name: VITAMIN D 1.25MG] 5 capsule 2     Sig: TAKE 1 CAPSULE BY MOUTH 1 TIME PER WEEK.      Last office visit with prescribing clinician: 7/11/2022      Next office visit with prescribing clinician: Visit date not found            JOAQUIN MONIQUE MA  08/04/22, 14:00 EDT

## 2022-10-05 DIAGNOSIS — R00.2 PALPITATION: Chronic | ICD-10-CM

## 2022-10-05 DIAGNOSIS — J30.9 ALLERGIC RHINITIS, UNSPECIFIED SEASONALITY, UNSPECIFIED TRIGGER: ICD-10-CM

## 2022-10-05 DIAGNOSIS — F33.0 MILD EPISODE OF RECURRENT DEPRESSIVE DISORDER: Chronic | ICD-10-CM

## 2022-10-05 RX ORDER — METOPROLOL SUCCINATE 25 MG/1
TABLET, EXTENDED RELEASE ORAL
Qty: 30 TABLET | Refills: 4 | Status: SHIPPED | OUTPATIENT
Start: 2022-10-05

## 2022-10-05 RX ORDER — CITALOPRAM 20 MG/1
TABLET ORAL
Qty: 30 TABLET | Refills: 4 | Status: SHIPPED | OUTPATIENT
Start: 2022-10-05 | End: 2023-03-29

## 2022-10-05 RX ORDER — FLUTICASONE PROPIONATE 50 MCG
SPRAY, SUSPENSION (ML) NASAL
Qty: 16 G | Refills: 1 | Status: SHIPPED | OUTPATIENT
Start: 2022-10-05 | End: 2022-12-27

## 2022-11-02 DIAGNOSIS — J30.9 ALLERGIC RHINITIS, UNSPECIFIED SEASONALITY, UNSPECIFIED TRIGGER: ICD-10-CM

## 2022-11-02 DIAGNOSIS — Z76.0 MEDICATION REFILL: ICD-10-CM

## 2022-11-02 DIAGNOSIS — E55.9 VITAMIN D DEFICIENCY: ICD-10-CM

## 2022-11-02 RX ORDER — AZELASTINE HYDROCHLORIDE 137 UG/1
SPRAY, METERED NASAL
Qty: 30 ML | Refills: 2 | Status: SHIPPED | OUTPATIENT
Start: 2022-11-02

## 2022-11-02 RX ORDER — ERGOCALCIFEROL 1.25 MG/1
CAPSULE ORAL
Qty: 5 CAPSULE | Refills: 1 | Status: SHIPPED | OUTPATIENT
Start: 2022-11-02 | End: 2023-01-26

## 2022-11-02 NOTE — TELEPHONE ENCOUNTER
Rx Refill Note  Requested Prescriptions     Pending Prescriptions Disp Refills   • vitamin D (ERGOCALCIFEROL) 1.25 MG (46719 UT) capsule capsule [Pharmacy Med Name: VITAMIN D [id number]] 5 capsule 1     Sig: TAKE 1 CAPSULE BY MOUTH ONCE WEEKLY   • Azelastine HCl 137 MCG/SPRAY solution [Pharmacy Med Name: AZELASTINE SPR 0.1%] 30 mL 2     Sig: INSTILL 1 SPRAY INTO THE NOSTRILS EVERY DAY AS NEEDED AS DIRECTED BY PROVIDER      Last office visit with prescribing clinician: 7/11/2022      Next office visit with prescribing clinician: Visit date not found            Meenu Edwards MA  11/02/22, 09:50 EDT

## 2022-11-05 ENCOUNTER — TELEPHONE (OUTPATIENT)
Dept: FAMILY MEDICINE CLINIC | Facility: CLINIC | Age: 42
End: 2022-11-05

## 2022-11-05 NOTE — TELEPHONE ENCOUNTER
PT IS REQUESTING A CALL BACK REGARDING BIRTH CONTROL PRESCRIPTION. SHE STATES THAT SHE HAS BEEN GETTING NORETHINDRONE THROUGH A PRESCRIPTION FROM DR. CARSON, BUT THE PRESCRIPTION HAS RUN OUT OF REFILLS. SHE WOULD LIKE TO SEE IF GRAEME WOULD BE WILLING TO PRESCRIBE THIS FOR HER.

## 2022-11-09 DIAGNOSIS — Z30.9 ENCOUNTER FOR CONTRACEPTIVE MANAGEMENT, UNSPECIFIED TYPE: Primary | ICD-10-CM

## 2022-11-09 RX ORDER — ACETAMINOPHEN AND CODEINE PHOSPHATE 120; 12 MG/5ML; MG/5ML
1 SOLUTION ORAL DAILY
Qty: 28 TABLET | Refills: 2 | Status: SHIPPED | OUTPATIENT
Start: 2022-11-09 | End: 2023-02-07

## 2022-11-30 ENCOUNTER — LAB (OUTPATIENT)
Dept: LAB | Facility: HOSPITAL | Age: 42
End: 2022-11-30

## 2022-11-30 ENCOUNTER — OFFICE VISIT (OUTPATIENT)
Dept: ENDOCRINOLOGY | Facility: CLINIC | Age: 42
End: 2022-11-30

## 2022-11-30 VITALS
WEIGHT: 286 LBS | DIASTOLIC BLOOD PRESSURE: 80 MMHG | OXYGEN SATURATION: 98 % | HEIGHT: 66 IN | BODY MASS INDEX: 45.96 KG/M2 | HEART RATE: 82 BPM | SYSTOLIC BLOOD PRESSURE: 126 MMHG

## 2022-11-30 DIAGNOSIS — Z00.00 HEALTHCARE MAINTENANCE: ICD-10-CM

## 2022-11-30 DIAGNOSIS — C73 FOLLICULAR THYROID CANCER: Primary | ICD-10-CM

## 2022-11-30 DIAGNOSIS — E55.9 VITAMIN D DEFICIENCY: ICD-10-CM

## 2022-11-30 DIAGNOSIS — E89.0 POSTOPERATIVE HYPOTHYROIDISM: ICD-10-CM

## 2022-11-30 DIAGNOSIS — E66.01 CLASS 3 SEVERE OBESITY WITHOUT SERIOUS COMORBIDITY WITH BODY MASS INDEX (BMI) OF 45.0 TO 49.9 IN ADULT, UNSPECIFIED OBESITY TYPE: ICD-10-CM

## 2022-11-30 PROCEDURE — 82306 VITAMIN D 25 HYDROXY: CPT | Performed by: INTERNAL MEDICINE

## 2022-11-30 PROCEDURE — 84439 ASSAY OF FREE THYROXINE: CPT | Performed by: INTERNAL MEDICINE

## 2022-11-30 PROCEDURE — 83036 HEMOGLOBIN GLYCOSYLATED A1C: CPT | Performed by: INTERNAL MEDICINE

## 2022-11-30 PROCEDURE — 99214 OFFICE O/P EST MOD 30 MIN: CPT | Performed by: INTERNAL MEDICINE

## 2022-11-30 PROCEDURE — 84443 ASSAY THYROID STIM HORMONE: CPT | Performed by: INTERNAL MEDICINE

## 2022-11-30 PROCEDURE — 84432 ASSAY OF THYROGLOBULIN: CPT | Performed by: INTERNAL MEDICINE

## 2022-11-30 PROCEDURE — 86800 THYROGLOBULIN ANTIBODY: CPT | Performed by: INTERNAL MEDICINE

## 2022-11-30 RX ORDER — SEMAGLUTIDE 0.5 MG/.5ML
0.5 INJECTION, SOLUTION SUBCUTANEOUS WEEKLY
Qty: 2 ML | Refills: 3 | Status: SHIPPED | OUTPATIENT
Start: 2022-11-30 | End: 2023-03-31 | Stop reason: DRUGHIGH

## 2022-12-01 LAB
25(OH)D3 SERPL-MCNC: 44.2 NG/ML (ref 30–100)
HBA1C MFR BLD: 4.7 % (ref 4.8–5.6)
T4 FREE SERPL-MCNC: 1.63 NG/DL (ref 0.93–1.7)
TSH SERPL DL<=0.05 MIU/L-ACNC: 1.22 UIU/ML (ref 0.27–4.2)

## 2022-12-02 ENCOUNTER — PRIOR AUTHORIZATION (OUTPATIENT)
Dept: ENDOCRINOLOGY | Facility: CLINIC | Age: 42
End: 2022-12-02

## 2022-12-02 LAB
THYROGLOB AB SERPL-ACNC: <1 IU/ML (ref 0–0.9)
THYROGLOB SERPL-MCNC: 0.4 NG/ML (ref 1.5–38.5)

## 2022-12-02 NOTE — TELEPHONE ENCOUNTER
LINDA NUÑEZ (Se[place]: [id number])  Rx #: [id number]  Wegovy 0.5MG/0.5ML auto-injectors     Form  [employer] Electronic PA Form (2017 [employer])  Created  1 day ago  Sent to Plan  4 hours ago  Plan Response  4 hours ago  Submit Clinical Questions  4 hours ago  Determination  Favorable  41 minutes ago  Your prior authorization for Wegovy has been approved!

## 2022-12-12 RX ORDER — LEVOTHYROXINE SODIUM 150 UG/1
150 TABLET ORAL DAILY
Qty: 90 TABLET | Refills: 3 | Status: SHIPPED | OUTPATIENT
Start: 2022-12-12

## 2022-12-27 DIAGNOSIS — J30.9 ALLERGIC RHINITIS, UNSPECIFIED SEASONALITY, UNSPECIFIED TRIGGER: ICD-10-CM

## 2022-12-27 RX ORDER — FLUTICASONE PROPIONATE 50 MCG
SPRAY, SUSPENSION (ML) NASAL
Qty: 16 G | Refills: 0 | Status: SHIPPED | OUTPATIENT
Start: 2022-12-27 | End: 2023-01-26

## 2022-12-27 NOTE — TELEPHONE ENCOUNTER
Rx Refill Note  Requested Prescriptions     Pending Prescriptions Disp Refills   • fluticasone (FLONASE) 50 MCG/ACT nasal spray [Pharmacy Med Name: FLUTICASONE SPR 50MCG] 16 g 0     Sig: USE 1 SPRAY IN EACH NOSTRIL EVERY DAY      Last office visit with prescribing clinician: 7/11/2022   Last telemedicine visit with prescribing clinician: Visit date not found   Next office visit with prescribing clinician: Visit date not found                         Would you like a call back once the refill request has been completed: [] Yes [] No    If the office needs to give you a call back, can they leave a voicemail: [] Yes [] No    Hortencia Islas MA  12/27/22, 12:13 EST

## 2023-01-26 DIAGNOSIS — J30.9 ALLERGIC RHINITIS, UNSPECIFIED SEASONALITY, UNSPECIFIED TRIGGER: ICD-10-CM

## 2023-01-26 DIAGNOSIS — E55.9 VITAMIN D DEFICIENCY: ICD-10-CM

## 2023-01-26 RX ORDER — ERGOCALCIFEROL 1.25 MG/1
CAPSULE ORAL
Qty: 5 CAPSULE | Refills: 0 | Status: SHIPPED | OUTPATIENT
Start: 2023-01-26 | End: 2023-03-08

## 2023-01-26 RX ORDER — FLUTICASONE PROPIONATE 50 MCG
SPRAY, SUSPENSION (ML) NASAL
Qty: 16 G | Refills: 0 | Status: SHIPPED | OUTPATIENT
Start: 2023-01-26

## 2023-01-26 NOTE — TELEPHONE ENCOUNTER
Rx Refill Note  Requested Prescriptions     Pending Prescriptions Disp Refills   • fluticasone (FLONASE) 50 MCG/ACT nasal spray [Pharmacy Med Name: FLUTICASONE SPR 50MCG] 16 g 0     Sig: INSTILL 1 SPRAY IN EACH NOSTRIL EVERY DAY   • vitamin D (ERGOCALCIFEROL) 1.25 MG (18257 UT) capsule capsule [Pharmacy Med Name: VITAMIN D [id number]] 5 capsule 0     Sig: TAKE 1 CAPSULE BY MOUTH ONCE WEEKLY      Last office visit with prescribing clinician: 7/11/2022   Last telemedicine visit with prescribing clinician: Visit date not found   Next office visit with prescribing clinician: Visit date not found                         Would you like a call back once the refill request has been completed: [] Yes [] No    If the office needs to give you a call back, can they leave a voicemail: [] Yes [] No    Leanne Wynn MA  01/26/23, 11:46 EST

## 2023-02-02 ENCOUNTER — TELEPHONE (OUTPATIENT)
Dept: FAMILY MEDICINE CLINIC | Facility: CLINIC | Age: 43
End: 2023-02-02
Payer: COMMERCIAL

## 2023-02-02 DIAGNOSIS — E89.0 POSTOPERATIVE HYPOTHYROIDISM: ICD-10-CM

## 2023-02-02 NOTE — TELEPHONE ENCOUNTER
Caller: Laura Weathers    Relationship: Self    Best call back number: [phone]    What medications are you currently taking:   Current Outpatient Medications on File Prior to Visit   Medication Sig Dispense Refill   • albuterol sulfate  (90 Base) MCG/ACT inhaler Inhale 2 puffs Every 6 (Six) Hours As Needed for Wheezing.     • Azelastine HCl 137 MCG/SPRAY solution INSTILL 1 SPRAY INTO THE NOSTRILS EVERY DAY AS NEEDED AS DIRECTED BY PROVIDER 30 mL 2   • citalopram (CeleXA) 20 MG tablet TAKE 1 TABLET BY MOUTH EVERY DAY 30 tablet 4   • Cyanocobalamin (VITAMIN B-12 PO) Take 1,000 mg by mouth Every Other Day.     • EPINEPHrine (EPIPEN) 0.3 MG/0.3ML solution auto-injector injection 0.3 mg As Needed.     • fexofenadine (ALLEGRA) 180 MG tablet Take 180 mg by mouth Daily.     • fluticasone (FLONASE) 50 MCG/ACT nasal spray INSTILL 1 SPRAY IN EACH NOSTRIL EVERY DAY 16 g 0   • Levoxyl 150 MCG tablet Take 1 tablet by mouth Daily. 90 tablet 3   • losartan-hydrochlorothiazide (HYZAAR) 50-12.5 MG per tablet Take 1 tablet by mouth Daily. 30 tablet 5   • metoprolol succinate XL (TOPROL-XL) 25 MG 24 hr tablet TAKE 1 TABLET BY MOUTH EVERY DAY 30 tablet 4   • norethindrone (Giovanna) 0.35 MG tablet Take 1 tablet by mouth Daily. 28 tablet 2   • potassium chloride (KLOR-CON) 20 MEQ packet Take 20 mEq by mouth Daily.     • Semaglutide-Weight Management (Wegovy) 0.5 MG/0.5ML solution auto-injector Inject 0.5 mg under the skin into the appropriate area as directed 1 (One) Time Per Week. 2 mL 3   • traZODone (DESYREL) 50 MG tablet 1-2 to 1 tab qhs prn insomnia 30 tablet 3   • vitamin D (ERGOCALCIFEROL) 1.25 MG (23561 UT) capsule capsule TAKE 1 CAPSULE BY MOUTH ONCE WEEKLY 5 capsule 0     No current facility-administered medications on file prior to visit.      When did you start taking these medications: 12/27/22    Which medication are you concerned about: LEVOXYL 150 MCG     Who prescribed you this medication: VIRA  JONAS    What are your concerns: THE PHARMACY TOLD PATIENT THAT SHE DO NOT HAVE A SCRIPT FOR THE LEVOXYL 150 MCG. THE PATIENT HAD LAST REFILL THIS SCRIPT ON 12/27/22 AND WAS DISPENSED IN A 30 DAY SUPPLY INSTEAD OF THE 90 DAY SUPPLY.      HOWEVER THE PHARMACY  HAD  DISPENSED THE LEVOTHYROXINE 150 MCG ON 01/26/23 IN A 30 DAY SUPPLY INSTEAD OF A 90 DAY SUPPLY.     THE PATIENT IS NOW RUNNING LOW ON LEVOXYYL 150 MCG. WITH 2 PILLS LEFT ON HAND.     PLEASE CALL PATIENT TO DISCUSS THIS MATTER AS WELL AS RESEND  THE SCRIPT WITH THE REAMANING OF THE   90 DAY THE WAS DISPENSED IN A 30 DAY SUPPLY.    How long have you had these concerns: 01/26/23

## 2023-02-06 ENCOUNTER — TELEPHONE (OUTPATIENT)
Dept: ENDOCRINOLOGY | Facility: CLINIC | Age: 43
End: 2023-02-06
Payer: COMMERCIAL

## 2023-02-06 DIAGNOSIS — Z30.9 ENCOUNTER FOR CONTRACEPTIVE MANAGEMENT, UNSPECIFIED TYPE: ICD-10-CM

## 2023-02-06 RX ORDER — LEVOTHYROXINE SODIUM 150 MCG
150 TABLET ORAL DAILY
Qty: 90 TABLET | Refills: 3 | Status: SHIPPED | OUTPATIENT
Start: 2023-02-06

## 2023-02-06 NOTE — TELEPHONE ENCOUNTER
Rx Refill Note  Requested Prescriptions     Pending Prescriptions Disp Refills   • Giovanna 0.35 MG tablet [Pharmacy Med Name: GIOVANNA 0.35MG] 28 tablet 1     Sig: TAKE 1 TABLET BY MOUTH EVERY DAY      Last office visit with prescribing clinician: 7/11/2022   Last telemedicine visit with prescribing clinician: Visit date not found   Next office visit with prescribing clinician: Visit date not found                         Would you like a call back once the refill request has been completed: [] Yes [] No    If the office needs to give you a call back, can they leave a voicemail: [] Yes [] No    Meenu Edwards MA  02/06/23, 09:09 EST

## 2023-02-06 NOTE — TELEPHONE ENCOUNTER
PT CALLED STATING SHE NORMALLY TAKES LEVOXYL. SHE STATED HER PHARM HAS BEEN DISPENSING SYNTHROID OR LEVOTHYROXINE. SHE STATED HER PHARM DOES NOT HAVE A LEVOXYL RX ON FILE. SHE REQUESTED WE LOOK INTO THIS AND REACH OUT TO HER. SHE CALLED LAST WEEK ON 02/02/23 BUT THE MESSAGE WENT TO A ARIE BOWLING.

## 2023-02-06 NOTE — TELEPHONE ENCOUNTER
Spoke with pharmacy and they stated they do't have levoxyl in stock right now. Stated they have Synthroid. Pt okay with changing. Script sent to pharmacy.

## 2023-02-07 RX ORDER — ACETAMINOPHEN AND CODEINE PHOSPHATE 120; 12 MG/5ML; MG/5ML
1 SOLUTION ORAL DAILY
Qty: 28 TABLET | Refills: 1 | Status: SHIPPED | OUTPATIENT
Start: 2023-02-07 | End: 2023-04-07

## 2023-03-01 ENCOUNTER — TELEPHONE (OUTPATIENT)
Dept: ENDOCRINOLOGY | Facility: CLINIC | Age: 43
End: 2023-03-01
Payer: COMMERCIAL

## 2023-03-01 NOTE — TELEPHONE ENCOUNTER
PT CALLED REQUESTING WE REACH BACK OUT TO HER. SHE STATED SHE HAS ADDITIONAL Q's IN REGARDS TO HER WEGOVY DENIAL

## 2023-03-01 NOTE — TELEPHONE ENCOUNTER
PA is not needed. Pharmacy messed up and PA was sent. Spoke with pt. Her PA is good until 7/2023.

## 2023-03-08 DIAGNOSIS — E55.9 VITAMIN D DEFICIENCY: ICD-10-CM

## 2023-03-08 RX ORDER — ERGOCALCIFEROL 1.25 MG/1
CAPSULE ORAL
Qty: 5 CAPSULE | Refills: 0 | Status: SHIPPED | OUTPATIENT
Start: 2023-03-08

## 2023-03-29 DIAGNOSIS — F33.0 MILD EPISODE OF RECURRENT DEPRESSIVE DISORDER: Chronic | ICD-10-CM

## 2023-03-29 RX ORDER — CITALOPRAM 20 MG/1
TABLET ORAL
Qty: 30 TABLET | Refills: 3 | Status: SHIPPED | OUTPATIENT
Start: 2023-03-29

## 2023-03-31 DIAGNOSIS — E66.01 CLASS 3 SEVERE OBESITY WITHOUT SERIOUS COMORBIDITY WITH BODY MASS INDEX (BMI) OF 45.0 TO 49.9 IN ADULT, UNSPECIFIED OBESITY TYPE: Primary | ICD-10-CM

## 2023-03-31 RX ORDER — SEMAGLUTIDE 1 MG/.5ML
1 INJECTION, SOLUTION SUBCUTANEOUS WEEKLY
Qty: 2 ML | Refills: 3 | Status: SHIPPED | OUTPATIENT
Start: 2023-03-31

## 2023-03-31 NOTE — TELEPHONE ENCOUNTER
PT CALLED STATING SHE HAS BEEN TAKING WEGOVY 0.5. SHE WANTED TO KNOW WHEN OR IF SHE IS TO INCREASE HER DOSE. SHE REQUESTED A CALL BACK TO CONSULT.

## 2023-04-05 DIAGNOSIS — R00.2 PALPITATION: Chronic | ICD-10-CM

## 2023-04-05 DIAGNOSIS — Z30.9 ENCOUNTER FOR CONTRACEPTIVE MANAGEMENT, UNSPECIFIED TYPE: ICD-10-CM

## 2023-04-05 NOTE — TELEPHONE ENCOUNTER
Rx Refill Note  Requested Prescriptions     Pending Prescriptions Disp Refills   • Giovanna 0.35 MG tablet [Pharmacy Med Name: GIOVANNA 0.35MG] 28 tablet 0     Sig: TAKE 1 TABLET BY MOUTH DAILY. NEEDS APPOINTMENT FOR FURTHER REFILLS.   • metoprolol succinate XL (TOPROL-XL) 25 MG 24 hr tablet [Pharmacy Med Name: METOPROL SUC 25MG ER] 30 tablet 3     Sig: TAKE 1 TABLET BY MOUTH EVERY DAY      Last office visit with prescribing clinician: 7/11/2022   Last telemedicine visit with prescribing clinician: Visit date not found   Next office visit with prescribing clinician: Visit date not found                         Would you like a call back once the refill request has been completed: [] Yes [] No    If the office needs to give you a call back, can they leave a voicemail: [] Yes [] No    Ramona Doll MA  04/05/23, 10:49 EDT

## 2023-04-07 RX ORDER — NORETHINDRONE 0.35 MG/1
TABLET ORAL
Qty: 28 TABLET | Refills: 0 | Status: SHIPPED | OUTPATIENT
Start: 2023-04-07

## 2023-04-07 RX ORDER — METOPROLOL SUCCINATE 25 MG/1
TABLET, EXTENDED RELEASE ORAL
Qty: 30 TABLET | Refills: 3 | Status: SHIPPED | OUTPATIENT
Start: 2023-04-07

## 2023-04-12 ENCOUNTER — SPECIALTY PHARMACY (OUTPATIENT)
Dept: ENDOCRINOLOGY | Facility: CLINIC | Age: 43
End: 2023-04-12

## 2023-04-12 NOTE — PROGRESS NOTES
Specialty Pharmacy Patient Management Program  Endocrinology Benefits Investigation      Laura is seen by a [facility] Endocrinology provider and is currently taking a target specialty medication.  The patient IS ELIGIBLE for enrollment in the Endocrinology Patient Management Program offered by [facility].     Insurance: [facility]  Medication(s) and Costs:   WeGovy 28 Days, $25

## 2023-04-17 DIAGNOSIS — E55.9 VITAMIN D DEFICIENCY: ICD-10-CM

## 2023-04-18 RX ORDER — ERGOCALCIFEROL 1.25 MG/1
CAPSULE ORAL
Qty: 5 CAPSULE | Refills: 0 | Status: SHIPPED | OUTPATIENT
Start: 2023-04-18

## 2023-04-18 NOTE — TELEPHONE ENCOUNTER
Rx Refill Note  Requested Prescriptions     Pending Prescriptions Disp Refills   • vitamin D (ERGOCALCIFEROL) 1.25 MG (86118 UT) capsule capsule [Pharmacy Med Name: VITAMIN D [id number]] 5 capsule 0     Sig: TAKE 1 CAPSULE BY MOUTH ONCE WEEKLY      Last office visit with prescribing clinician: 7/11/2022   Last telemedicine visit with prescribing clinician: Visit date not found   Next office visit with prescribing clinician: Visit date not found                         Would you like a call back once the refill request has been completed: [] Yes [] No    If the office needs to give you a call back, can they leave a voicemail: [] Yes [] No    Ramona Doll MA  04/18/23, 07:42 EDT

## 2023-04-27 DIAGNOSIS — I10 ESSENTIAL HYPERTENSION: Chronic | ICD-10-CM

## 2023-04-27 RX ORDER — LOSARTAN POTASSIUM AND HYDROCHLOROTHIAZIDE 12.5; 5 MG/1; MG/1
TABLET ORAL
Qty: 30 TABLET | Refills: 4 | Status: SHIPPED | OUTPATIENT
Start: 2023-04-27

## 2023-04-27 NOTE — TELEPHONE ENCOUNTER
Rx Refill Note  Requested Prescriptions     Pending Prescriptions Disp Refills   • losartan-hydrochlorothiazide (HYZAAR) 50-12.5 MG per tablet [Pharmacy Med Name: LOSARTAN/HCT 50-12.5] 30 tablet 4     Sig: TAKE 1 TABLET BY MOUTH EVERY DAY      Last office visit with prescribing clinician: 7/11/2022   Last telemedicine visit with prescribing clinician: Visit date not found   Next office visit with prescribing clinician: Visit date not found                         Would you like a call back once the refill request has been completed: [] Yes [] No    If the office needs to give you a call back, can they leave a voicemail: [] Yes [] No    Meenu Edwards MA  04/27/23, 09:12 EDT

## 2023-05-03 DIAGNOSIS — Z30.9 ENCOUNTER FOR CONTRACEPTIVE MANAGEMENT, UNSPECIFIED TYPE: ICD-10-CM

## 2023-05-03 RX ORDER — NORETHINDRONE 0.35 MG/1
TABLET ORAL
Qty: 28 TABLET | Refills: 0 | Status: SHIPPED | OUTPATIENT
Start: 2023-05-03

## 2023-05-18 DIAGNOSIS — E55.9 VITAMIN D DEFICIENCY: ICD-10-CM

## 2023-05-18 RX ORDER — ERGOCALCIFEROL 1.25 MG/1
CAPSULE ORAL
Qty: 5 CAPSULE | Refills: 0 | Status: SHIPPED | OUTPATIENT
Start: 2023-05-18

## 2023-05-30 ENCOUNTER — SPECIALTY PHARMACY (OUTPATIENT)
Dept: ENDOCRINOLOGY | Facility: CLINIC | Age: 43
End: 2023-05-30

## 2023-05-30 NOTE — PROGRESS NOTES
Specialty Pharmacy Patient Management Program  Endocrinology Benefits Investigation      Laura is seen by a [facility] Endocrinology provider and is currently taking a target specialty medication.  The patient IS ELIGIBLE for enrollment in the Endocrinology Patient Management Program offered by [facility].     Insurance: [place] ([facility])   Medication(s) and Costs:     Wegovy 28 Days, $0

## 2023-06-05 PROCEDURE — 84443 ASSAY THYROID STIM HORMONE: CPT | Performed by: INTERNAL MEDICINE

## 2023-06-05 PROCEDURE — 84439 ASSAY OF FREE THYROXINE: CPT | Performed by: INTERNAL MEDICINE

## 2023-06-05 PROCEDURE — 80053 COMPREHEN METABOLIC PANEL: CPT | Performed by: INTERNAL MEDICINE

## 2023-06-06 DIAGNOSIS — Z30.9 ENCOUNTER FOR CONTRACEPTIVE MANAGEMENT, UNSPECIFIED TYPE: ICD-10-CM

## 2023-06-07 RX ORDER — NORETHINDRONE 0.35 MG/1
TABLET ORAL
Qty: 28 TABLET | Refills: 0 | Status: SHIPPED | OUTPATIENT
Start: 2023-06-07

## 2023-06-07 NOTE — TELEPHONE ENCOUNTER
Rx Refill Note  Requested Prescriptions     Pending Prescriptions Disp Refills    Giovanna 0.35 MG tablet [Pharmacy Med Name: GIOVANNA 0.35MG] 28 tablet 0     Sig: TAKE 1 TABLET BY MOUTH EVERY DAY      Last office visit with prescribing clinician: 7/11/2022   Last telemedicine visit with prescribing clinician: Visit date not found   Next office visit with prescribing clinician: Visit date not found                         Would you like a call back once the refill request has been completed: [] Yes [] No    If the office needs to give you a call back, can they leave a voicemail: [] Yes [] No    Leanne Wynn MA  06/07/23, 09:16 EDT

## 2023-06-23 PROBLEM — E66.01 CLASS 3 SEVERE OBESITY WITH SERIOUS COMORBIDITY AND BODY MASS INDEX (BMI) OF 40.0 TO 44.9 IN ADULT: Status: ACTIVE | Noted: 2023-06-23

## 2023-06-23 PROBLEM — E66.813 CLASS 3 SEVERE OBESITY WITH SERIOUS COMORBIDITY AND BODY MASS INDEX (BMI) OF 40.0 TO 44.9 IN ADULT: Status: ACTIVE | Noted: 2023-06-23

## 2023-06-26 ENCOUNTER — TELEPHONE (OUTPATIENT)
Dept: FAMILY MEDICINE CLINIC | Facility: CLINIC | Age: 43
End: 2023-06-26

## 2023-06-26 DIAGNOSIS — I10 ESSENTIAL HYPERTENSION: Chronic | ICD-10-CM

## 2023-06-26 DIAGNOSIS — E55.9 VITAMIN D DEFICIENCY: ICD-10-CM

## 2023-06-26 RX ORDER — LOSARTAN POTASSIUM AND HYDROCHLOROTHIAZIDE 12.5; 5 MG/1; MG/1
1 TABLET ORAL DAILY
Qty: 30 TABLET | Refills: 4 | Status: CANCELLED | OUTPATIENT
Start: 2023-06-26

## 2023-06-26 RX ORDER — ERGOCALCIFEROL 1.25 MG/1
50000 CAPSULE ORAL WEEKLY
Qty: 5 CAPSULE | Refills: 0 | Status: CANCELLED | OUTPATIENT
Start: 2023-06-26

## 2023-06-26 NOTE — TELEPHONE ENCOUNTER
Caller: Jasiel Laurajaye Adrian    Relationship: Self    Best call back number: [phone]    Requested Prescriptions:   Requested Prescriptions     Pending Prescriptions Disp Refills    losartan-hydrochlorothiazide (HYZAAR) 50-12.5 MG per tablet 30 tablet 4     Sig: Take 1 tablet by mouth Daily.    vitamin D (ERGOCALCIFEROL) 1.25 MG (06765 UT) capsule capsule 5 capsule 0     Sig: Take 1 capsule by mouth 1 (One) Time Per Week.        Pharmacy where request should be sent: [facility] [place] [address] [address] [facility] [address] - [phone]  - [phone]     Last office visit with prescribing clinician: 7/11/2022   Last telemedicine visit with prescribing clinician: Visit date not found   Next office visit with prescribing clinician: Visit date not found     Additional details provided by patient:     Does the patient have less than a 3 day supply:  [x] Yes  [] No    Would you like a call back once the refill request has been completed: [x] Yes [] No    If the office needs to give you a call back, can they leave a voicemail: [] Yes [x] No    Carlo Blunt, PCT   06/26/23 14:07 EDT

## 2023-08-02 ENCOUNTER — HOSPITAL ENCOUNTER (OUTPATIENT)
Dept: MAMMOGRAPHY | Facility: HOSPITAL | Age: 43
Discharge: HOME | End: 2023-08-02
Admitting: OBSTETRICS & GYNECOLOGY
Payer: COMMERCIAL

## 2023-08-02 DIAGNOSIS — Z12.31 SCREENING MAMMOGRAM FOR BREAST CANCER: ICD-10-CM

## 2023-08-02 PROCEDURE — 77067 SCR MAMMO BI INCL CAD: CPT

## 2023-08-02 PROCEDURE — 77063 BREAST TOMOSYNTHESIS BI: CPT

## 2023-08-15 DIAGNOSIS — F33.0 MILD EPISODE OF RECURRENT DEPRESSIVE DISORDER: Chronic | ICD-10-CM

## 2023-08-15 RX ORDER — CITALOPRAM 20 MG/1
20 TABLET ORAL DAILY
Qty: 30 TABLET | Refills: 0 | Status: SHIPPED | OUTPATIENT
Start: 2023-08-15

## 2023-08-15 NOTE — TELEPHONE ENCOUNTER
Rx Refill Note  Requested Prescriptions     Pending Prescriptions Disp Refills    citalopram (CeleXA) 20 MG tablet [Pharmacy Med Name: Citalopram Hydrobromide Oral Tablet 20 MG] 30 tablet 0     Sig: TAKE 1 TABLET BY MOUTH EVERY DAY      Last office visit with prescribing clinician: 7/11/2022   Last telemedicine visit with prescribing clinician: Visit date not found   Next office visit with prescribing clinician: Visit date not found                         Would you like a call back once the refill request has been completed: [] Yes [] No    If the office needs to give you a call back, can they leave a voicemail: [] Yes [] No    Meenu Edwards MA  08/15/23, 11:17 EDT

## 2023-08-31 ENCOUNTER — OFFICE VISIT (OUTPATIENT)
Dept: FAMILY MEDICINE CLINIC | Facility: CLINIC | Age: 43
End: 2023-08-31
Payer: COMMERCIAL

## 2023-08-31 VITALS
TEMPERATURE: 98 F | BODY MASS INDEX: 40.76 KG/M2 | OXYGEN SATURATION: 97 % | SYSTOLIC BLOOD PRESSURE: 136 MMHG | HEART RATE: 92 BPM | HEIGHT: 66 IN | WEIGHT: 253.6 LBS | DIASTOLIC BLOOD PRESSURE: 84 MMHG

## 2023-08-31 DIAGNOSIS — I10 PRIMARY HYPERTENSION: Chronic | ICD-10-CM

## 2023-08-31 DIAGNOSIS — I25.10 CORONARY ARTERY DISEASE INVOLVING LEFT MAIN CORONARY ARTERY: Primary | ICD-10-CM

## 2023-08-31 PROBLEM — I21.4 NSTEMI (NON-ST ELEVATED MYOCARDIAL INFARCTION): Status: ACTIVE | Noted: 2023-08-31

## 2023-08-31 PROBLEM — R06.00 DYSPNEA: Status: ACTIVE | Noted: 2023-08-31

## 2023-08-31 PROCEDURE — 99214 OFFICE O/P EST MOD 30 MIN: CPT | Performed by: NURSE PRACTITIONER

## 2023-08-31 RX ORDER — CLOPIDOGREL BISULFATE 75 MG/1
TABLET ORAL
COMMUNITY

## 2023-08-31 RX ORDER — METOPROLOL SUCCINATE 50 MG/1
1 TABLET, EXTENDED RELEASE ORAL DAILY
COMMUNITY
Start: 2023-07-11

## 2023-08-31 RX ORDER — HYDROCHLOROTHIAZIDE 12.5 MG/1
TABLET ORAL
COMMUNITY
Start: 2023-07-11

## 2023-08-31 RX ORDER — LOSARTAN POTASSIUM 25 MG/1
TABLET ORAL
COMMUNITY
Start: 2023-07-11

## 2023-08-31 NOTE — PROGRESS NOTES
Follow Up Office Note     Patient Name: Laura Weathers  : 1980   MRN: [id number]     Chief Complaint:    Chief Complaint   Patient presents with    Follow-up     Per patient she had a heart attack in May and has had 3 stents placed. States she just doesn't feel herself and still fatigued.     Hypertension     Per patient since starting cardiac rehab her blood pressure has been running high.     Chest Pain       History of Present Illness: Laura Weathers is a 43 y.o. female who presents today with c/o left shoulder pain and pain in back. Patient s/p MI with stent placement in May of this year. She denies chest pain, palpitations or shortness of breath. She does c/o fatigue.    Patient presents for re-evaluation/management HTN. Patient reports BP elevated since she has been going to cardiac rehab.    Pain  The current episode started in the past 7 days. The problem occurs intermittently. The problem has been waxing and waning. Associated symptoms include arthralgias (left shoulder). Pertinent negatives include no chest pain, chills, coughing, diaphoresis, headaches, nausea, neck pain, vomiting or weakness.   Hypertension  This is a chronic problem. The current episode started more than 1 year ago. The problem has been waxing and waning since onset. Associated symptoms include malaise/fatigue. Pertinent negatives include no chest pain, headaches, neck pain, palpitations or shortness of breath. Agents associated with hypertension include thyroid hormones and oral contraceptives. Risk factors for coronary artery disease include obesity and dyslipidemia. Current antihypertension treatment includes beta blockers, angiotensin blockers and diuretics. The current treatment provides moderate improvement. Hypertensive end-organ damage includes CAD/MI.      Subjective      I have reviewed and the following portions of the patient's history were updated as appropriate: past family history, past medical  history, past social history, past surgical history and problem list.    Review of Systems:   Review of Systems   Constitutional:  Positive for malaise/fatigue. Negative for chills and diaphoresis.   Respiratory:  Negative for cough, chest tightness and shortness of breath.    Cardiovascular:  Negative for chest pain, palpitations and leg swelling.   Gastrointestinal:  Negative for nausea and vomiting.   Musculoskeletal:  Positive for arthralgias (left shoulder) and back pain. Negative for neck pain.   Neurological: Negative.  Negative for dizziness, tremors, seizures, syncope, facial asymmetry, speech difficulty, weakness, light-headedness and headaches.      Past Medical History:   Past Medical History:   Diagnosis Date    Allergic     Depression     Follicular thyroid cancer 06/23/2017    T3N0M0 follicular thyroid cancer s/p two stage thyroidectomy and I -131 remnant ablation    Heart attack     Hypertension     Postoperative hypothyroidism 06/2013    Preeclampsia     delivered, with a postpartum complication         Medications:     Current Outpatient Medications:     albuterol sulfate  (90 Base) MCG/ACT inhaler, Inhale 2 puffs Every 6 (Six) Hours As Needed for Wheezing., Disp: , Rfl:     aspirin 81 MG EC tablet, 1 tablet., Disp: , Rfl:     Azelastine HCl 137 MCG/SPRAY solution, INSTILL 1 SPRAY INTO THE NOSTRILS EVERY DAY AS NEEDED AS DIRECTED BY PROVIDER, Disp: 30 mL, Rfl: 2    citalopram (CeleXA) 20 MG tablet, Take 1 tablet by mouth Daily. Must be seen for further refills., Disp: 30 tablet, Rfl: 0    clopidogrel (PLAVIX) 75 MG tablet, , Disp: , Rfl:     EPINEPHrine (EPIPEN) 0.3 MG/0.3ML solution auto-injector injection, 0.3 mL As Needed., Disp: , Rfl:     fexofenadine (ALLEGRA) 180 MG tablet, Take 1 tablet by mouth Daily., Disp: , Rfl:     fluticasone (FLONASE) 50 MCG/ACT nasal spray, INSTILL 1 SPRAY IN EACH NOSTRIL EVERY DAY, Disp: 16 g, Rfl: 0    hydroCHLOROthiazide (HYDRODIURIL) 12.5 MG tablet, ,  "Disp: , Rfl:     Lipitor 80 MG tablet, , Disp: , Rfl:     losartan (COZAAR) 25 MG tablet, , Disp: , Rfl:     metoprolol succinate XL (TOPROL-XL) 50 MG 24 hr tablet, Take 1 tablet by mouth Daily., Disp: , Rfl:     norethindrone (Giovanna) 0.35 MG tablet, Take 1 tablet by mouth Daily for 84 days., Disp: 84 tablet, Rfl: 3    potassium chloride (KLOR-CON) 20 MEQ packet, Take 20 mEq by mouth Daily., Disp: , Rfl:     Semaglutide-Weight Management 1.7 MG/0.75ML solution auto-injector, Inject 0.75 mL under the skin into the appropriate area as directed 1 (One) Time Per Week., Disp: 3 mL, Rfl: 5    Synthroid 150 MCG tablet, Take 1 tablet by mouth Daily., Disp: 90 tablet, Rfl: 3    traZODone (DESYREL) 50 MG tablet, 1-2 to 1 tab qhs prn insomnia, Disp: 30 tablet, Rfl: 3    vitamin D (ERGOCALCIFEROL) 1.25 MG (54257 UT) capsule capsule, Take 1 capsule by mouth 1 (One) Time Per Week., Disp: 5 capsule, Rfl: 2    Allergies:   Allergies   Allergen Reactions    Prednisone Hives    Coreg [Carvedilol] Other (See Comments)     Liver side effect    Adhesive Tape Rash         Objective     Physical Exam:  Vital Signs:   Vitals:    08/31/23 1052   BP: 136/84   Pulse: 92   Temp: 98 øF (36.7 øC)   TempSrc: Infrared   SpO2: 97%   Weight: 115 kg (253 lb 9.6 oz)   Height: 167.6 cm (65.98\")   PainSc: 0-No pain     Body mass index is 40.95 kg/mý.     Physical Exam  Vitals and nursing note reviewed.   Constitutional:       General: She is not in acute distress.     Appearance: Normal appearance. She is well-developed. She is not ill-appearing, toxic-appearing or diaphoretic.   HENT:      Head: Normocephalic and atraumatic.   Cardiovascular:      Rate and Rhythm: Normal rate and regular rhythm.      Heart sounds: Normal heart sounds, S1 normal and S2 normal. No murmur heard.  Pulmonary:      Effort: Pulmonary effort is normal. No respiratory distress.      Breath sounds: Normal breath sounds. No stridor. No wheezing.   Musculoskeletal:      Right " lower leg: No edema.      Left lower leg: No edema.   Skin:     General: Skin is warm and dry.   Neurological:      General: No focal deficit present.      Mental Status: She is alert and oriented to person, place, and time.   Psychiatric:         Mood and Affect: Mood normal.         Behavior: Behavior normal. Behavior is cooperative.         Thought Content: Thought content normal.         Judgment: Judgment normal.       Assessment / Plan      Assessment/Plan:   Diagnoses and all orders for this visit:    1. Coronary artery disease involving left main coronary artery (Primary)  Assessment & Plan:  Due to patient left shoulder and back pain along with recent MI in May of this year I advised patient that she should go to ER now for further evaluation and management. She states that she sensation in her shoulder and back is to how she felt when she had her MI. Patient verbalized understanding and agreement with plan of care.   Patient reports compliance with her medication and cardiac rehab.  Continue ASA and Plavix.  Patient would like referral to [facility].    Orders:  -     Ambulatory Referral to Cardiology    2. Primary hypertension  Assessment & Plan:  Hypertension is  stable at this time . BP only slightly above goal of less than 130/80.  Continue current treatment regimen.  Dietary sodium restriction.  Weight loss.  Regular aerobic exercise.  Continue current medications.  Ambulatory blood pressure monitoring.  Blood pressure will be reassessed in 4 weeks.         Follow Up:   PRN and at next scheduled appointment(s) with PCP.    Discussed the nature of the medical condition(s) risks, complications, implications, management, safe and proper use of medications. Encouraged medication compliance, and keeping scheduled follow up appointments with me and any other providers.      RTC if symptoms fail to improve, to ER if symptoms worsen.        *Dictated Utilizing Dragon Dictation   Please note that  portions of this note were completed with a voice recognition program.   Part of this note may be an electronic transcription/translation of spoken language to printed text using the Dragon Dictation System. Spelling and/or grammatical errors may exist despite efforts at proofreading.      NOTE TO PATIENT: The 21st Century Cures Act makes medical notes like these available to patients in the interest of transparency. However, be advised this is a medical document. It is intended as peer to peer communication. It is written in medical language and may contain abbreviations or verbiage that are unfamiliar. It may appear blunt or direct. Medical documents are intended to carry relevant information, facts as evident, and the clinical opinion of the practitioner.      DUANE Sawyer  [facility] [place]

## 2023-09-01 ENCOUNTER — TELEPHONE (OUTPATIENT)
Dept: ENDOCRINOLOGY | Facility: CLINIC | Age: 43
End: 2023-09-01
Payer: COMMERCIAL

## 2023-09-01 NOTE — TELEPHONE ENCOUNTER
Patient called requesting a call back from Dr. Lara. She said she was in the ER yesterday and her T4 is elevated and TSH is low. She was in the ER in [place]. They are thinking she needs to have medication dose changes. Please advise.

## 2023-09-01 NOTE — TELEPHONE ENCOUNTER
PT CALLED BACK. SHE STATED THE RECORDS OFFICE THAT HAS THE LABS IS CLOSED FOR THE DAY. SHE STATED SHE CAN EMAIL THE RESULTS. SHE REQUESTED A CALL BACK TO CONSULT.

## 2023-09-01 NOTE — TELEPHONE ENCOUNTER
Patient is going to fax the lab results to  the office  Advised Dr. Lara will review early next week and we will contact her

## 2023-09-03 PROBLEM — I21.4 NSTEMI (NON-ST ELEVATED MYOCARDIAL INFARCTION): Chronic | Status: ACTIVE | Noted: 2023-08-31

## 2023-09-03 PROBLEM — I25.10 CORONARY ARTERY DISEASE INVOLVING LEFT MAIN CORONARY ARTERY: Status: ACTIVE | Noted: 2023-09-03

## 2023-09-03 PROBLEM — U07.1 COVID-19: Status: RESOLVED | Noted: 2022-07-11 | Resolved: 2023-09-03

## 2023-09-03 NOTE — ASSESSMENT & PLAN NOTE
Hypertension is  stable at this time . BP only slightly above goal of less than 130/80.  Continue current treatment regimen.  Dietary sodium restriction.  Weight loss.  Regular aerobic exercise.  Continue current medications.  Ambulatory blood pressure monitoring.  Blood pressure will be reassessed in 4 weeks.

## 2023-09-03 NOTE — ASSESSMENT & PLAN NOTE
Due to patient left shoulder and back pain along with recent MI in May of this year I advised patient that she should go to ER now for further evaluation and management. She states that she sensation in her shoulder and back is to how she felt when she had her MI. Patient verbalized understanding and agreement with plan of care.   Patient reports compliance with her medication and cardiac rehab.  Continue ASA and Plavix.  Patient would like referral to [facility].

## 2023-09-05 DIAGNOSIS — E89.0 POSTOPERATIVE HYPOTHYROIDISM: Primary | ICD-10-CM

## 2023-09-05 RX ORDER — LEVOTHYROXINE SODIUM 137 MCG
137 TABLET ORAL EVERY MORNING
Qty: 90 TABLET | Refills: 1 | Status: SHIPPED | OUTPATIENT
Start: 2023-09-05

## 2023-09-05 NOTE — TELEPHONE ENCOUNTER
Spoke with patient. Had evaluation at [facility] for increased fatigue (r/o cardiac cause)   (08/31/2023) TSH 0.10 (0.36 - 3.74), free T4 1.79 (0.76 - 1.46). Pt has had suppressive dosing for years for her h/o thyroid cancer in the past and experienced palpitations at that time. I did not think these labs would explain her fatigue, but will decrease her Synthroid to 137 mcg qAM to see if she notes any benefit.   Pt is on wegovy 1.7 mg weekly and states she is tolerating. Pt did not attribute her fatigue to the Wegovy.   Signed: Valeria Lara MD

## 2023-09-20 DIAGNOSIS — F33.0 MILD EPISODE OF RECURRENT DEPRESSIVE DISORDER: Chronic | ICD-10-CM

## 2023-09-20 RX ORDER — CITALOPRAM 20 MG/1
TABLET ORAL
Qty: 30 TABLET | Refills: 0 | Status: SHIPPED | OUTPATIENT
Start: 2023-09-20 | End: 2023-09-22

## 2023-09-22 DIAGNOSIS — F33.0 MILD EPISODE OF RECURRENT DEPRESSIVE DISORDER: Chronic | ICD-10-CM

## 2023-09-22 RX ORDER — CITALOPRAM 20 MG/1
TABLET ORAL
Qty: 30 TABLET | Refills: 0 | Status: SHIPPED | OUTPATIENT
Start: 2023-09-22

## 2023-10-03 DIAGNOSIS — I10 ESSENTIAL HYPERTENSION: Chronic | ICD-10-CM

## 2023-10-04 DIAGNOSIS — I10 ESSENTIAL HYPERTENSION: Primary | ICD-10-CM

## 2023-10-05 DIAGNOSIS — I10 ESSENTIAL HYPERTENSION: Primary | ICD-10-CM

## 2023-10-05 RX ORDER — HYDROCHLOROTHIAZIDE 12.5 MG/1
12.5 TABLET ORAL DAILY
Qty: 30 TABLET | Refills: 3 | Status: SHIPPED | OUTPATIENT
Start: 2023-10-05

## 2023-10-05 RX ORDER — LOSARTAN POTASSIUM AND HYDROCHLOROTHIAZIDE 12.5; 5 MG/1; MG/1
TABLET ORAL
Qty: 90 TABLET | Refills: 0 | OUTPATIENT
Start: 2023-10-05

## 2023-10-05 RX ORDER — LOSARTAN POTASSIUM 25 MG/1
75 TABLET ORAL DAILY
Qty: 90 TABLET | Refills: 3 | Status: SHIPPED | OUTPATIENT
Start: 2023-10-05

## 2023-10-06 DIAGNOSIS — E55.9 VITAMIN D DEFICIENCY: ICD-10-CM

## 2023-10-06 RX ORDER — ERGOCALCIFEROL 1.25 MG/1
CAPSULE ORAL
Qty: 5 CAPSULE | Refills: 0 | Status: SHIPPED | OUTPATIENT
Start: 2023-10-06

## 2023-10-12 ENCOUNTER — TELEPHONE (OUTPATIENT)
Dept: OBSTETRICS AND GYNECOLOGY | Facility: CLINIC | Age: 43
End: 2023-10-12
Payer: COMMERCIAL

## 2023-10-12 NOTE — TELEPHONE ENCOUNTER
Patient called stating she was seen in June by [facility] and placed on Giovanna birth control. In the beginning with this birth control she was having periods and now she is not. She was need reassurance to see if this is okay. I have advised the patient that this is Okay. Patient also stated she had to switch pharmacies due to her insurance changing and she is on a birth control called sharobel and wanted to know if this is okay. I have advised this is fine that it is the same thing as the Giovanna just made by a different . Patient v/u.

## 2023-10-28 DIAGNOSIS — F33.0 MILD EPISODE OF RECURRENT DEPRESSIVE DISORDER: Chronic | ICD-10-CM

## 2023-10-28 RX ORDER — CITALOPRAM 20 MG/1
TABLET ORAL
Qty: 30 TABLET | Refills: 0 | Status: SHIPPED | OUTPATIENT
Start: 2023-10-28

## 2023-11-07 ENCOUNTER — OFFICE VISIT (OUTPATIENT)
Dept: ENDOCRINOLOGY | Facility: CLINIC | Age: 43
End: 2023-11-07
Payer: COMMERCIAL

## 2023-11-07 VITALS
OXYGEN SATURATION: 98 % | HEART RATE: 96 BPM | BODY MASS INDEX: 39.86 KG/M2 | HEIGHT: 66 IN | DIASTOLIC BLOOD PRESSURE: 86 MMHG | SYSTOLIC BLOOD PRESSURE: 120 MMHG | WEIGHT: 248 LBS

## 2023-11-07 DIAGNOSIS — E66.01 CLASS 3 SEVERE OBESITY WITH SERIOUS COMORBIDITY AND BODY MASS INDEX (BMI) OF 40.0 TO 44.9 IN ADULT, UNSPECIFIED OBESITY TYPE: ICD-10-CM

## 2023-11-07 DIAGNOSIS — E55.9 VITAMIN D DEFICIENCY: ICD-10-CM

## 2023-11-07 DIAGNOSIS — E89.0 POSTOPERATIVE HYPOTHYROIDISM: ICD-10-CM

## 2023-11-07 DIAGNOSIS — C73 FOLLICULAR THYROID CANCER: Primary | ICD-10-CM

## 2023-11-07 LAB
ALBUMIN SERPL-MCNC: 4.2 G/DL (ref 3.5–5.2)
ALBUMIN/GLOB SERPL: 1.6 G/DL
ALP SERPL-CCNC: 103 U/L (ref 39–117)
ALT SERPL W P-5'-P-CCNC: 28 U/L (ref 1–33)
ANION GAP SERPL CALCULATED.3IONS-SCNC: 11.2 MMOL/L (ref 5–15)
AST SERPL-CCNC: 19 U/L (ref 1–32)
BILIRUB SERPL-MCNC: 0.7 MG/DL (ref 0–1.2)
BUN SERPL-MCNC: 6 MG/DL (ref 6–20)
BUN/CREAT SERPL: 7.5 (ref 7–25)
CALCIUM SPEC-SCNC: 9.4 MG/DL (ref 8.6–10.5)
CHLORIDE SERPL-SCNC: 102 MMOL/L (ref 98–107)
CO2 SERPL-SCNC: 24.8 MMOL/L (ref 22–29)
CREAT SERPL-MCNC: 0.8 MG/DL (ref 0.57–1)
EGFRCR SERPLBLD CKD-EPI 2021: 92.7 ML/MIN/1.73
GLOBULIN UR ELPH-MCNC: 2.6 GM/DL
GLUCOSE SERPL-MCNC: 77 MG/DL (ref 65–99)
POTASSIUM SERPL-SCNC: 4 MMOL/L (ref 3.5–5.2)
PROT SERPL-MCNC: 6.8 G/DL (ref 6–8.5)
SODIUM SERPL-SCNC: 138 MMOL/L (ref 136–145)
T4 FREE SERPL-MCNC: 1.27 NG/DL (ref 0.93–1.7)
TSH SERPL DL<=0.05 MIU/L-ACNC: 1.68 UIU/ML (ref 0.27–4.2)

## 2023-11-07 PROCEDURE — 80053 COMPREHEN METABOLIC PANEL: CPT | Performed by: INTERNAL MEDICINE

## 2023-11-07 PROCEDURE — 86800 THYROGLOBULIN ANTIBODY: CPT | Performed by: INTERNAL MEDICINE

## 2023-11-07 PROCEDURE — 84439 ASSAY OF FREE THYROXINE: CPT | Performed by: INTERNAL MEDICINE

## 2023-11-07 PROCEDURE — 84443 ASSAY THYROID STIM HORMONE: CPT | Performed by: INTERNAL MEDICINE

## 2023-11-07 PROCEDURE — 99214 OFFICE O/P EST MOD 30 MIN: CPT | Performed by: INTERNAL MEDICINE

## 2023-11-07 PROCEDURE — 84432 ASSAY OF THYROGLOBULIN: CPT | Performed by: INTERNAL MEDICINE

## 2023-11-07 RX ORDER — SEMAGLUTIDE 2.4 MG/.75ML
2.4 INJECTION, SOLUTION SUBCUTANEOUS WEEKLY
Qty: 3 ML | Refills: 5 | Status: SHIPPED | OUTPATIENT
Start: 2023-11-07

## 2023-11-07 RX ORDER — ERGOCALCIFEROL 1.25 MG/1
CAPSULE ORAL
Qty: 5 CAPSULE | Refills: 0 | Status: SHIPPED | OUTPATIENT
Start: 2023-11-07

## 2023-11-07 NOTE — PROGRESS NOTES
Chief Complaint   Patient presents with    Follicular thyroid cancer     Follow up        HPI:   Laura Weathers is a 43 y.o.female who returns to Endocrine Clinic for f/u evaluation of her hypothyroidism and follicular thyroid cancer, and obesity. Last visit 06/05/2023. Her history is as follows:    Interim Events:   - Pt with URI currently  - metoprolol was recently d/c'd due to fatigue. Pt reports fatigue improved after stopping the metoprolol  - Had stress test last week  - Is tolerating Wegovy 1.7 mg weekly: occasional acid reflux, constipation     1) T3N0M0 follicular thyroid carcinoma:  - found to have a palpable left lobe nodule/goiter on physical exam during pregnancy in 2013. Post partum she had FNA of a 1.5 cm left lobe nodule by Dr. Phoenix Hanna. Cytology showed follicular neoplasm and excision was recommended.  - pt underwent a two stage thyroidectomy in 06/26/2013 and 07/01/2013    - (06/26/2013) s/p left thyroid lobectomy and isthmusectomy at [facility]   Surgical Pathology: T3N0Mx Follicular carcinoma  - Left lobe tumor, 1.8 cm in greatest dimension.  * Tumor capsule present  *Tumor capsule invasion present  *Lymph-vascular invasion present  *Focal extrathyroidal extension identified.  *Focal angioinvasion by neoplasm identified.  *Margins free of neoplasm.  * (0/4) Four peripheral lymph nodes negative for metastatic carcinoma.     - (07/01/2013) s/p completion right lobectomy.  Surgical Pathology: variably sized colloid filled follicles without atypia or tumor involvement. No parathyroid glands or lymph nodes are seen .     Postoperative Course:  - (07/24/2013): Received 105.3 mCi of I-131 at [facility] for remnant ablation ordered by Dr. Phoenix Hanna. Pt was off thyroid hormone and prepped with 1-2 weeks of a low iodine diet. TSH was 75, TG was not collected prior to treatment  - (08/13/2014) Thyrogen Stimulated I-131 WBS ([facility]) - negative scan, stimulated TG was 1.9  - (12/09/2015)   In office neck US by Dr. Hanna was negative  - (08/15/2016) Thyrogen Stimulated I-131 WBS ([facility]) - negative scan, stimulated TG was 1.7    LAB Summary:  (11/07/2023) TG 0.4, TG Abs <1.0 ([facility]), TSH 1.680 (0.270 - 4.200), FT4 1.27 (0.93 - 1.70) - on 137 mcg  (11/30/2022) TG 0.4, TG Abs <1.0 ([facility]), TSH 1.220 (0.270 - 4.200), FT4 1.63 (0.93 - 1.70) - on 150 mcg  (11/23/2020) TG 0.3, TG Abs <1.0 ([facility]), TSH 0.034 (0.270 - 4.200), FT4 1.71 (0.93 - 1.70) - on 175 mcg  (05/29/2019) TG 0.3, TG Abs <1.0 ([facility]), TSH 0.478 (0.270 - 4.200), FT4 1.53 (0.93 - 1.70)  (11/28/2019) TG 0.3, TG Abs <1.0 ([facility]), TSH 0.038 (0.350 - 5.350), FT4 1.49 (0.89 - 1.76)  (08/17/2016) stimulated TG 1.7  (12/08/2015) TG 0.4, TSH 0.13  (05/09/2015) TG 0.3, TSH 0.02  (08/13/2014) stimulated TG 1.9    Imaging:   (05/2018) Neck US in clinic - negative     2) postoperative hypothyroidism:  - was on suppressive thyroid hormone therapy with levothyroxine 175 mcg daily but pt could not tolerate. Alternating doses of 175 and 150 which is approx 164 mcg daily kept her TSH at 0.477,  Doses have fluctuated related to GI issues from food allergies    (08/31/2023) TSH 0.10 (0.36 - 3.74), free T4 1.79 (0.76 - 1.46). Decreased Synthroid form 150 to 137 due to concerns of symptoms at that time     Current Dose: Brand Synthroid 137 mcg qAM  Tolerating this regimen  Takes in AM on an empty stomach.  No food or hot liquids for at least 30 minutes  Not on a high-dose biotin supplement  No missed doses    3) Obesity, Class 3:  - (11/30/2022) Wt 286 lbs, BMI 46.2 - Started Wegovy in 12/2022.     Current Dose: Wegovy 1.7 mg weekly.   Tolerating. Occasional constipation, acid reflux.   Has lost 38 lbs, current BMI 40.03    Other History:   - Patient followed by allergy immunology. Has multiple food allergies that were causing GI symptoms. Allergy injections and dietary changes have helped symptoms.   - Had sinus polyps removed in 03/2021  - s/p  "MI in 05/2023. S/P 3 stents in LAD at [facility]    Review of Systems   Constitutional:  Positive for fatigue (intermittent).        Wt loss   HENT: Negative.     Eyes: Negative.  Negative for itching.   Respiratory: Negative.     Cardiovascular: Negative.  Negative for palpitations.   Gastrointestinal:  Positive for constipation (occasional). Negative for nausea and vomiting.   Endocrine: Negative.    Genitourinary: Negative.    Musculoskeletal: Negative.    Skin: Negative.    Allergic/Immunologic: Positive for environmental allergies and food allergies.   Neurological: Negative.    Hematological: Negative.    Psychiatric/Behavioral: Negative.         The following portions of the patient's history were reviewed and updated as appropriate: allergies, current medications, past family history, past medical history, past social history, past surgical history and problem list.      /86   Pulse 96   Ht 167.6 cm (66\")   Wt 112 kg (248 lb)   SpO2 98%   BMI 40.03 kg/m²   Physical Exam   Constitutional: She is oriented to person, place, and time. She appears well-developed. No distress.   HENT:   Head: Normocephalic.   Eyes: Pupils are equal, round, and reactive to light. Conjunctivae are normal.   Neck: No tracheal deviation present.   No palpable thyroid tissue     Cardiovascular: Normal rate, regular rhythm and normal heart sounds.   No murmur heard.  Pulmonary/Chest: Effort normal and breath sounds normal. No respiratory distress.   Abdominal: Normal appearance.   Lymphadenopathy:     She has no cervical adenopathy.   Neurological: She is alert and oriented to person, place, and time. No cranial nerve deficit.   Skin: Skin is warm and dry. She is not diaphoretic. No erythema.   Psychiatric: Her behavior is normal.   Vitals reviewed.      LABS/IMAGING: outside records reviewed and summarized in   Office Visit on 11/07/2023   Component Date Value Ref Range Status    TSH 11/07/2023 1.680  0.270 - 4.200 " uIU/mL Final    Free T4 11/07/2023 1.27  0.93 - 1.70 ng/dL Final    Glucose 11/07/2023 77  65 - 99 mg/dL Final    BUN 11/07/2023 6  6 - 20 mg/dL Final    Creatinine 11/07/2023 0.80  0.57 - 1.00 mg/dL Final    Sodium 11/07/2023 138  136 - 145 mmol/L Final    Potassium 11/07/2023 4.0  3.5 - 5.2 mmol/L Final    Chloride 11/07/2023 102  98 - 107 mmol/L Final    CO2 11/07/2023 24.8  22.0 - 29.0 mmol/L Final    Calcium 11/07/2023 9.4  8.6 - 10.5 mg/dL Final    Total Protein 11/07/2023 6.8  6.0 - 8.5 g/dL Final    Albumin 11/07/2023 4.2  3.5 - 5.2 g/dL Final    ALT (SGPT) 11/07/2023 28  1 - 33 U/L Final    AST (SGOT) 11/07/2023 19  1 - 32 U/L Final    Alkaline Phosphatase 11/07/2023 103  39 - 117 U/L Final    Total Bilirubin 11/07/2023 0.7  0.0 - 1.2 mg/dL Final    Globulin 11/07/2023 2.6  gm/dL Final    A/G Ratio 11/07/2023 1.6  g/dL Final    BUN/Creatinine Ratio 11/07/2023 7.5  7.0 - 25.0 Final    Anion Gap 11/07/2023 11.2  5.0 - 15.0 mmol/L Final    eGFR 11/07/2023 92.7  >60.0 mL/min/1.73 Final    Thyroglobulin Ab 11/07/2023 <1.0  0.0 - 0.9 IU/mL Final    Thyroglobulin Antibody measured by [facility] [employer] Methodology    THYROGLOBULIN BY [facility] 11/07/2023 0.4 (L)  1.5 - 38.5 ng/mL Final    According to the National Academy of Clinical Biochemistry, the  reference interval for Thyroglobulin (TG) should be related to  euthyroid patients and not for patients who underwent thyroidectomy.  TG reference intervals for these patients depend on the residual  mass of the thyroid tissue left after surgery. Establishing a  post-operative baseline is recommended.  The assay limit of quantitation is 0.1 ng/mL  Thyroglobulin measured by [facility] Immunometric Assay       ASSESSMENT/PLAN:  1) T3N0M0 follicular carcinoma: no evidence of disease recurrence at this time    - diagnosed 06/23/2013, s/p two stage thyroidectomy, and 105.3 mCi of I-131 for remnant ablation by her former endocrinologist   - have discussed the current American  Thyroid Association current guidelines for differentiated thyroid cancer management.  - using the REUBEN risk stratification system to estimate risk of persistent/recurrent disease, Ms. Weathers is in the intermediate risk category for persistent/recurrent disease    - discussed with patient that it is reassuring that her TG has remained stable at 0.3 - 0.5 for many years.    The overall plan will be to monitor for recurrent disease with TG levels every 12 months. Neck US to be performed as indicated. Her goal TSH will be between at the lower end of the normal range as tolerated    (11/07/2023) TG 0.4, TG Abs <1.0 ([facility]), TSH 1.680 (0.270 - 4.200), FT4 1.27 (0.93 - 1.70) - on 137 mcg    2) postoperative hypothyroidism:  - was on suppressive thyroid hormone therapy with levothyroxine 175 mcg daily but pt could not tolerate.  - Her TSH levels had fluctuated in 2020.  This may possibly be related to GI symptoms she has had related to food allergies.   - TSH today was WNL at 1.680  - Will continue Brand Synthroid 137 mcg qAM    3) Obesity, Class 3  Has lost 38 lbs, current BMI 40.03  - Increasing Wegovy to 2.4 mg weekly.  Reviewed potential side effects. Pt to notify me if medication not tolerated    RTC 5 months    Signed: Valeria Lara MD

## 2023-11-09 LAB
THYROGLOB AB SERPL-ACNC: <1 IU/ML (ref 0–0.9)
THYROGLOB SERPL-MCNC: 0.4 NG/ML (ref 1.5–38.5)

## 2023-11-30 DIAGNOSIS — F33.0 MILD EPISODE OF RECURRENT DEPRESSIVE DISORDER: Chronic | ICD-10-CM

## 2023-11-30 RX ORDER — CITALOPRAM 20 MG/1
TABLET ORAL
Qty: 30 TABLET | Refills: 0 | Status: SHIPPED | OUTPATIENT
Start: 2023-11-30

## 2023-12-11 DIAGNOSIS — Z76.0 MEDICATION REFILL: ICD-10-CM

## 2023-12-11 DIAGNOSIS — J30.9 ALLERGIC RHINITIS, UNSPECIFIED SEASONALITY, UNSPECIFIED TRIGGER: ICD-10-CM

## 2023-12-11 RX ORDER — FLUTICASONE PROPIONATE 50 MCG
2 SPRAY, SUSPENSION (ML) NASAL DAILY
Qty: 16 G | Refills: 2 | Status: SHIPPED | OUTPATIENT
Start: 2023-12-11

## 2023-12-11 RX ORDER — AZELASTINE HYDROCHLORIDE 137 UG/1
1 SPRAY, METERED NASAL DAILY
Qty: 30 ML | Refills: 2 | Status: SHIPPED | OUTPATIENT
Start: 2023-12-11

## 2023-12-11 NOTE — TELEPHONE ENCOUNTER
Caller: Laura Weathers    Relationship: Self    Best call back number: [phone]     Requested Prescriptions:   Requested Prescriptions     Pending Prescriptions Disp Refills    fluticasone (FLONASE) 50 MCG/ACT nasal spray 16 g 0    Azelastine HCl 137 MCG/SPRAY solution 30 mL 2        Pharmacy where request should be sent: [id number] - [place], [place] - [address] [facility] [address] - [phone]  - [phone] FX     Last office visit with prescribing clinician: 8/31/2023   Last telemedicine visit with prescribing clinician: Visit date not found   Next office visit with prescribing clinician: Visit date not found     Additional details provided by patient: LESS THAN 3 DAYS LEFT     Does the patient have less than a 3 day supply:  [x] Yes  [] No    Would you like a call back once the refill request has been completed: [] Yes [x] No    If the office needs to give you a call back, can they leave a voicemail: [] Yes [x] No    Doreen Alvarez Rep   12/11/23 13:52 EST

## 2023-12-13 ENCOUNTER — TRANSCRIBE ORDERS (OUTPATIENT)
Dept: LAB | Facility: HOSPITAL | Age: 43
End: 2023-12-13
Payer: COMMERCIAL

## 2023-12-13 DIAGNOSIS — E66.9 OBESITY, UNSPECIFIED CLASSIFICATION, UNSPECIFIED OBESITY TYPE, UNSPECIFIED WHETHER SERIOUS COMORBIDITY PRESENT: Primary | ICD-10-CM

## 2023-12-19 ENCOUNTER — LAB (OUTPATIENT)
Dept: LAB | Facility: HOSPITAL | Age: 43
End: 2023-12-19
Payer: COMMERCIAL

## 2023-12-19 DIAGNOSIS — E66.9 OBESITY, UNSPECIFIED CLASSIFICATION, UNSPECIFIED OBESITY TYPE, UNSPECIFIED WHETHER SERIOUS COMORBIDITY PRESENT: ICD-10-CM

## 2023-12-19 LAB
CHOLEST SERPL-MCNC: 87 MG/DL (ref 0–200)
HDLC SERPL-MCNC: 30 MG/DL (ref 40–60)
LDLC SERPL CALC-MCNC: 44 MG/DL (ref 0–100)
LDLC/HDLC SERPL: 1.53 {RATIO}
TRIGL SERPL-MCNC: 55 MG/DL (ref 0–150)
VLDLC SERPL-MCNC: 13 MG/DL (ref 5–40)

## 2023-12-19 PROCEDURE — 36415 COLL VENOUS BLD VENIPUNCTURE: CPT

## 2023-12-19 PROCEDURE — 80061 LIPID PANEL: CPT

## 2023-12-19 PROCEDURE — 83036 HEMOGLOBIN GLYCOSYLATED A1C: CPT

## 2023-12-20 LAB — HBA1C MFR BLD: 4.7 % (ref 4.8–5.6)

## 2023-12-25 DIAGNOSIS — E55.9 VITAMIN D DEFICIENCY: ICD-10-CM

## 2023-12-26 RX ORDER — ERGOCALCIFEROL 1.25 MG/1
CAPSULE ORAL
Qty: 5 CAPSULE | Refills: 0 | Status: SHIPPED | OUTPATIENT
Start: 2023-12-26

## 2024-01-24 ENCOUNTER — TELEPHONE (OUTPATIENT)
Dept: FAMILY MEDICINE CLINIC | Facility: CLINIC | Age: 44
End: 2024-01-24

## 2024-01-24 DIAGNOSIS — F33.0 MILD EPISODE OF RECURRENT DEPRESSIVE DISORDER: Chronic | ICD-10-CM

## 2024-01-24 RX ORDER — CITALOPRAM 20 MG/1
20 TABLET ORAL DAILY
Qty: 30 TABLET | Refills: 2 | Status: SHIPPED | OUTPATIENT
Start: 2024-01-24

## 2024-01-24 NOTE — TELEPHONE ENCOUNTER
Caller: Laura Weathers    Relationship: Self    Best call back number:       [phone] (Mobile)     Which medication are you concerned about:     citalopram (CeleXA) 20 MG tablet     PATIENT STATED SHE HAS A (4) DAY SUPPLY OF MEDICATION LEFT    Who prescribed you this medication:     GRAEME BOWLING    What are your concerns:     PATIENT REQUESTED PRESCRIPTION LISTED ABOVE TO BE FORWARDED TO PHARMACY:    [facility] - [place], [place]    TELEPHONE CONTACT:    [phone]    PATIENT ALSO REQUESTED ALL FUTURE NEW PRESCRIPTIONS OR REFILLS BE FORWARDED TO [facility] INSTEAD OF THE [facility]

## 2024-01-24 NOTE — TELEPHONE ENCOUNTER
I called patient and made her aware this had been sent.   Left a message for patient to call back to set up scan with david

## 2024-02-01 ENCOUNTER — LAB (OUTPATIENT)
Dept: LAB | Facility: HOSPITAL | Age: 44
End: 2024-02-01
Payer: COMMERCIAL

## 2024-02-01 ENCOUNTER — OFFICE VISIT (OUTPATIENT)
Dept: FAMILY MEDICINE CLINIC | Facility: CLINIC | Age: 44
End: 2024-02-01
Payer: COMMERCIAL

## 2024-02-01 VITALS
TEMPERATURE: 99.3 F | HEART RATE: 90 BPM | BODY MASS INDEX: 39.02 KG/M2 | OXYGEN SATURATION: 99 % | WEIGHT: 242.8 LBS | HEIGHT: 66 IN | SYSTOLIC BLOOD PRESSURE: 148 MMHG | DIASTOLIC BLOOD PRESSURE: 82 MMHG

## 2024-02-01 DIAGNOSIS — I10 PRIMARY HYPERTENSION: ICD-10-CM

## 2024-02-01 DIAGNOSIS — E78.00 PURE HYPERCHOLESTEROLEMIA: ICD-10-CM

## 2024-02-01 DIAGNOSIS — R53.82 CHRONIC FATIGUE: ICD-10-CM

## 2024-02-01 DIAGNOSIS — N91.2 AMENORRHEA: ICD-10-CM

## 2024-02-01 DIAGNOSIS — E55.9 VITAMIN D DEFICIENCY: ICD-10-CM

## 2024-02-01 DIAGNOSIS — G56.03 BILATERAL CARPAL TUNNEL SYNDROME: ICD-10-CM

## 2024-02-01 DIAGNOSIS — R25.2 LEG CRAMPING: Primary | ICD-10-CM

## 2024-02-01 DIAGNOSIS — E89.0 POSTOPERATIVE HYPOTHYROIDISM: ICD-10-CM

## 2024-02-01 PROBLEM — I25.10 CORONARY ARTERY DISEASE INVOLVING LEFT MAIN CORONARY ARTERY: Chronic | Status: ACTIVE | Noted: 2023-09-03

## 2024-02-01 PROBLEM — R00.2 PALPITATIONS: Status: ACTIVE | Noted: 2023-10-13

## 2024-02-01 PROBLEM — R06.00 DYSPNEA: Status: RESOLVED | Noted: 2023-08-31 | Resolved: 2024-02-01

## 2024-02-01 PROBLEM — R00.2 PALPITATIONS: Status: RESOLVED | Noted: 2023-10-13 | Resolved: 2024-02-01

## 2024-02-01 PROBLEM — R07.9 CHEST PAIN: Status: RESOLVED | Noted: 2023-10-13 | Resolved: 2024-02-01

## 2024-02-01 PROBLEM — R07.9 CHEST PAIN: Status: ACTIVE | Noted: 2023-10-13

## 2024-02-01 LAB
25(OH)D3 SERPL-MCNC: 80.7 NG/ML (ref 30–100)
ALBUMIN SERPL-MCNC: 4.4 G/DL (ref 3.5–5.2)
ALBUMIN/GLOB SERPL: 1.8 G/DL
ALP SERPL-CCNC: 102 U/L (ref 39–117)
ALT SERPL W P-5'-P-CCNC: 25 U/L (ref 1–33)
ANION GAP SERPL CALCULATED.3IONS-SCNC: 13.1 MMOL/L (ref 5–15)
AST SERPL-CCNC: 19 U/L (ref 1–32)
BACTERIA UR QL AUTO: ABNORMAL /HPF
BILIRUB SERPL-MCNC: 0.9 MG/DL (ref 0–1.2)
BILIRUB UR QL STRIP: NEGATIVE
BUN SERPL-MCNC: 8 MG/DL (ref 6–20)
BUN/CREAT SERPL: 8.8 (ref 7–25)
CALCIUM SPEC-SCNC: 9 MG/DL (ref 8.6–10.5)
CHLORIDE SERPL-SCNC: 104 MMOL/L (ref 98–107)
CHOLEST SERPL-MCNC: 88 MG/DL (ref 0–200)
CK SERPL-CCNC: 54 U/L (ref 20–180)
CLARITY UR: ABNORMAL
CO2 SERPL-SCNC: 24.9 MMOL/L (ref 22–29)
COLOR UR: ABNORMAL
CREAT SERPL-MCNC: 0.91 MG/DL (ref 0.57–1)
DEPRECATED RDW RBC AUTO: 41.1 FL (ref 37–54)
EGFRCR SERPLBLD CKD-EPI 2021: 79.4 ML/MIN/1.73
ERYTHROCYTE [DISTWIDTH] IN BLOOD BY AUTOMATED COUNT: 12.4 % (ref 12.3–15.4)
ESTRADIOL SERPL HS-MCNC: 18.7 PG/ML
FSH SERPL-ACNC: 58.1 MIU/ML
GLOBULIN UR ELPH-MCNC: 2.5 GM/DL
GLUCOSE SERPL-MCNC: 82 MG/DL (ref 65–99)
GLUCOSE UR STRIP-MCNC: NEGATIVE MG/DL
HCG SERPL QL: NEGATIVE
HCT VFR BLD AUTO: 45.7 % (ref 34–46.6)
HDLC SERPL-MCNC: 31 MG/DL (ref 40–60)
HGB BLD-MCNC: 15.9 G/DL (ref 12–15.9)
HGB UR QL STRIP.AUTO: NEGATIVE
HOLD SPECIMEN: NORMAL
HYALINE CASTS UR QL AUTO: ABNORMAL /LPF
KETONES UR QL STRIP: NEGATIVE
LDLC SERPL CALC-MCNC: 46 MG/DL (ref 0–100)
LDLC/HDLC SERPL: 1.57 {RATIO}
LEUKOCYTE ESTERASE UR QL STRIP.AUTO: ABNORMAL
MAGNESIUM SERPL-MCNC: 2.2 MG/DL (ref 1.6–2.6)
MCH RBC QN AUTO: 31.7 PG (ref 26.6–33)
MCHC RBC AUTO-ENTMCNC: 34.8 G/DL (ref 31.5–35.7)
MCV RBC AUTO: 91 FL (ref 79–97)
NITRITE UR QL STRIP: NEGATIVE
PH UR STRIP.AUTO: 7 [PH] (ref 5–8)
PLATELET # BLD AUTO: 264 10*3/MM3 (ref 140–450)
PMV BLD AUTO: 10.6 FL (ref 6–12)
POTASSIUM SERPL-SCNC: 4.1 MMOL/L (ref 3.5–5.2)
PROLACTIN SERPL-MCNC: 13.5 NG/ML (ref 4.79–23.3)
PROT SERPL-MCNC: 6.9 G/DL (ref 6–8.5)
PROT UR QL STRIP: ABNORMAL
RBC # BLD AUTO: 5.02 10*6/MM3 (ref 3.77–5.28)
RBC # UR STRIP: ABNORMAL /HPF
REF LAB TEST METHOD: ABNORMAL
SODIUM SERPL-SCNC: 142 MMOL/L (ref 136–145)
SP GR UR STRIP: 1.02 (ref 1–1.03)
SQUAMOUS #/AREA URNS HPF: ABNORMAL /HPF
TESTOST SERPL-MCNC: 14.3 NG/DL (ref 8.4–48.1)
TRIGL SERPL-MCNC: 42 MG/DL (ref 0–150)
TSH SERPL DL<=0.05 MIU/L-ACNC: 0.25 UIU/ML (ref 0.27–4.2)
UROBILINOGEN UR QL STRIP: ABNORMAL
VIT B12 BLD-MCNC: 534 PG/ML (ref 211–946)
VLDLC SERPL-MCNC: 11 MG/DL (ref 5–40)
WBC # UR STRIP: ABNORMAL /HPF
WBC NRBC COR # BLD AUTO: 5.23 10*3/MM3 (ref 3.4–10.8)

## 2024-02-01 PROCEDURE — 80050 GENERAL HEALTH PANEL: CPT | Performed by: NURSE PRACTITIONER

## 2024-02-01 PROCEDURE — 84403 ASSAY OF TOTAL TESTOSTERONE: CPT | Performed by: NURSE PRACTITIONER

## 2024-02-01 PROCEDURE — 82670 ASSAY OF TOTAL ESTRADIOL: CPT | Performed by: NURSE PRACTITIONER

## 2024-02-01 PROCEDURE — 99214 OFFICE O/P EST MOD 30 MIN: CPT | Performed by: NURSE PRACTITIONER

## 2024-02-01 PROCEDURE — 84146 ASSAY OF PROLACTIN: CPT | Performed by: NURSE PRACTITIONER

## 2024-02-01 PROCEDURE — 83735 ASSAY OF MAGNESIUM: CPT | Performed by: NURSE PRACTITIONER

## 2024-02-01 PROCEDURE — 81001 URINALYSIS AUTO W/SCOPE: CPT | Performed by: NURSE PRACTITIONER

## 2024-02-01 PROCEDURE — 82550 ASSAY OF CK (CPK): CPT | Performed by: NURSE PRACTITIONER

## 2024-02-01 PROCEDURE — 82306 VITAMIN D 25 HYDROXY: CPT | Performed by: NURSE PRACTITIONER

## 2024-02-01 PROCEDURE — 80061 LIPID PANEL: CPT | Performed by: NURSE PRACTITIONER

## 2024-02-01 PROCEDURE — 84703 CHORIONIC GONADOTROPIN ASSAY: CPT | Performed by: NURSE PRACTITIONER

## 2024-02-01 PROCEDURE — 36415 COLL VENOUS BLD VENIPUNCTURE: CPT | Performed by: NURSE PRACTITIONER

## 2024-02-01 PROCEDURE — 82607 VITAMIN B-12: CPT | Performed by: NURSE PRACTITIONER

## 2024-02-01 PROCEDURE — 83001 ASSAY OF GONADOTROPIN (FSH): CPT | Performed by: NURSE PRACTITIONER

## 2024-02-01 PROCEDURE — 87086 URINE CULTURE/COLONY COUNT: CPT | Performed by: NURSE PRACTITIONER

## 2024-02-01 RX ORDER — DILTIAZEM HYDROCHLORIDE 120 MG/1
120 CAPSULE, COATED, EXTENDED RELEASE ORAL DAILY
COMMUNITY

## 2024-02-01 RX ORDER — ERGOCALCIFEROL 1.25 MG/1
50000 CAPSULE ORAL WEEKLY
Qty: 5 CAPSULE | Refills: 2 | Status: SHIPPED | OUTPATIENT
Start: 2024-02-01

## 2024-02-01 NOTE — ASSESSMENT & PLAN NOTE
Chronic problem which has been worsening.  No response to conservative treatments with bracing and OTC anti-inflammatories such as ibuprofen.  Plan to refer to hand specialist for further evaluation and management.  Patient may benefit from cortisone injections.  Continue bracing at night.

## 2024-02-01 NOTE — ASSESSMENT & PLAN NOTE
Newly identified problem of uncertain etiology.  Laboratory studies per orders, further recommendation after results evaluation.

## 2024-02-01 NOTE — ASSESSMENT & PLAN NOTE
Problem which requires further workup.  Laboratory studies per orders, further recommendation after results evaluation.

## 2024-02-01 NOTE — PROGRESS NOTES
Follow Up Office Note     Patient Name: Laura Weathers  : 1980   MRN: [id number]     Chief Complaint:    Chief Complaint   Patient presents with    review medication      Started new bp medication and tightness in muscle     Wrist Pain    Spasms    Menstrual Problem       History of Present Illness: Laura Weathers is a 43 y.o. female who presents today with several complaints/health concerns.    Patient c/o muscle cramping and spasms at night in her legs. She is wondering if it could be related to her new blood pressure medication-diltiazem or possibly an electrolyte imbalance from HCTZ. Patient denies claudication.      Patient c/o bilateral wrist pain along with numbness and tingling of her fingers which has been progressively worsening over the past year. Patient states that her left wrist is worse. She has tried bracing but has only had minimal benefit. Patient is concerned because she is having decreased grasping strength.     Patient c/o amenorrhea x several months along with loss of libido. She requesting hormone levels be checked today.       Subjective      I have reviewed and the following portions of the patient's history were updated as appropriate: past family history, past medical history, past social history, past surgical history and problem list.    Review of Systems:   Review of Systems   Constitutional:  Positive for fatigue. Negative for appetite change, chills and diaphoresis.   Respiratory: Negative.     Cardiovascular: Negative.    Genitourinary:  Positive for menstrual problem.        Loss of libido   Musculoskeletal:  Positive for myalgias.        Past Medical History:   Past Medical History:   Diagnosis Date    Allergic     Depression     Follicular thyroid cancer 2017    T3N0M0 follicular thyroid cancer s/p two stage thyroidectomy and I -131 remnant ablation    Headache     Sinus    Heart attack     Hypertension     Postoperative hypothyroidism 2013     Preeclampsia     delivered, with a postpartum complication     LMP: 2-3 months ago    Medications:     Current Outpatient Medications:     albuterol sulfate  (90 Base) MCG/ACT inhaler, Inhale 2 puffs Every 6 (Six) Hours As Needed for Wheezing., Disp: , Rfl:     aspirin 81 MG EC tablet, 1 tablet Daily., Disp: , Rfl:     Azelastine HCl 137 MCG/SPRAY solution, 1 spray by Each Nare route Daily., Disp: 30 mL, Rfl: 2    citalopram (CeleXA) 20 MG tablet, Take 1 tablet by mouth Daily., Disp: 30 tablet, Rfl: 2    clopidogrel (PLAVIX) 75 MG tablet, Take 1 tablet by mouth Daily., Disp: , Rfl:     dilTIAZem CD (CARDIZEM CD) 120 MG 24 hr capsule, Take 1 capsule by mouth Daily., Disp: , Rfl:     EPINEPHrine (EPIPEN) 0.3 MG/0.3ML solution auto-injector injection, 0.3 mL As Needed., Disp: , Rfl:     fexofenadine (ALLEGRA) 180 MG tablet, Take 1 tablet by mouth Daily., Disp: , Rfl:     fluticasone (FLONASE) 50 MCG/ACT nasal spray, 2 sprays into the nostril(s) as directed by provider Daily., Disp: 16 g, Rfl: 2    hydroCHLOROthiazide (HYDRODIURIL) 12.5 MG tablet, Take 1 tablet by mouth Daily., Disp: 30 tablet, Rfl: 3    Lipitor 80 MG tablet, Take 1 tablet by mouth Daily., Disp: , Rfl:     potassium chloride (KLOR-CON) 20 MEQ packet, Take 20 mEq by mouth Daily., Disp: , Rfl:     Semaglutide-Weight Management (Wegovy) 2.4 MG/0.75ML solution auto-injector, Inject 2.4 mg under the skin into the appropriate area as directed 1 (One) Time Per Week., Disp: 3 mL, Rfl: 5    Synthroid 137 MCG tablet, Take 1 tablet by mouth Every Morning., Disp: 90 tablet, Rfl: 1    traZODone (DESYREL) 50 MG tablet, 1-2 to 1 tab qhs prn insomnia, Disp: 30 tablet, Rfl: 3    vitamin D (ERGOCALCIFEROL) 1.25 MG (97918 UT) capsule capsule, Take 1 capsule by mouth 1 (One) Time Per Week., Disp: 5 capsule, Rfl: 2    norethindrone (Giovanna) 0.35 MG tablet, Take 1 tablet by mouth Daily for 84 days., Disp: 84 tablet, Rfl: 3    Allergies:   Allergies   Allergen Reactions  "   Prednisone Hives    Coreg [Carvedilol] Other (See Comments)     Liver side effect    Adhesive Tape Rash         Objective     Physical Exam:  Vital Signs:   Vitals:    02/01/24 0948   BP: 148/82   BP Location: Right arm   Patient Position: Sitting   Cuff Size: Large Adult   Pulse: 90   Temp: 99.3 °F (37.4 °C)   TempSrc: Infrared   SpO2: 99%   Weight: 110 kg (242 lb 12.8 oz)   Height: 167.6 cm (66\")   PainSc: 0-No pain     Body mass index is 39.19 kg/m².     Physical Exam  Vitals and nursing note reviewed.   Constitutional:       General: She is not in acute distress.     Appearance: Normal appearance. She is well-developed. She is not ill-appearing, toxic-appearing or diaphoretic.   HENT:      Head: Normocephalic and atraumatic.   Cardiovascular:      Rate and Rhythm: Normal rate and regular rhythm.      Heart sounds: Normal heart sounds.   Pulmonary:      Effort: Pulmonary effort is normal. No respiratory distress.      Breath sounds: Normal breath sounds. No stridor. No wheezing.   Musculoskeletal:      Comments: Positive Tinel's bilateral wrists   Skin:     General: Skin is warm and dry.   Neurological:      General: No focal deficit present.      Mental Status: She is alert and oriented to person, place, and time. Mental status is at baseline.   Psychiatric:         Mood and Affect: Mood normal.         Behavior: Behavior normal. Behavior is cooperative.         Thought Content: Thought content normal.         Judgment: Judgment normal.         Assessment / Plan      Assessment/Plan:   Diagnoses and all orders for this visit:    1. Leg cramping (Primary)  Assessment & Plan:  Newly identified problem of uncertain etiology.  Laboratory studies per orders, further recommendation after results evaluation.    Orders:  -     Comprehensive Metabolic Panel; Future  -     CK; Future  -     Magnesium; Future    2. Bilateral carpal tunnel syndrome  Assessment & Plan:  Chronic problem which has been worsening.  No " response to conservative treatments with bracing and OTC anti-inflammatories such as ibuprofen.  Plan to refer to hand specialist for further evaluation and management.  Patient may benefit from cortisone injections.  Continue bracing at night.    Orders:  -     Ambulatory Referral to Hand Surgery    3. Amenorrhea  Assessment & Plan:  Problem which requires further workup.  Laboratory studies per orders, further recommendation after results evaluation.    Orders:  -     TSH; Future  -     hCG, Serum, Qualitative; Future  -     Estradiol; Future  -     Follicle Stimulating Hormone; Future  -     Prolactin; Future  -     Testosterone; Future      4. Primary hypertension  -     Comprehensive Metabolic Panel; Future  -     CBC (No Diff); Future  -     Urinalysis With Culture If Indicated - Urine, Clean Catch; Future    5. Postoperative hypothyroidism  -     TSH; Future    6. Pure hypercholesterolemia  -     Lipid Panel; Future    7. Vitamin D deficiency  -     vitamin D (ERGOCALCIFEROL) 1.25 MG (34919 UT) capsule capsule; Take 1 capsule by mouth 1 (One) Time Per Week.  Dispense: 5 capsule; Refill: 2  -     Vitamin D,25-Hydroxy; Future      8. Chronic fatigue  -     TSH; Future  -     Vitamin D,25-Hydroxy; Future  -     Vitamin B12; Future         Follow Up:   PRN and at next scheduled appointment(s) with PCP.    Discussed the nature of the medical condition(s) risks, complications, implications, management, safe and proper use of medications. Encouraged medication compliance, and keeping scheduled follow up appointments with me and any other providers.      RTC if symptoms fail to improve, to ER if symptoms worsen.        *Dictated Utilizing Dragon Dictation   Please note that portions of this note were completed with a voice recognition program.   Part of this note may be an electronic transcription/translation of spoken language to printed text using the Dragon Dictation System. Spelling and/or grammatical errors may  exist despite efforts at proofreading.      NOTE TO PATIENT: The 21st Century Cures Act makes medical notes like these available to patients in the interest of transparency. However, be advised this is a medical document. It is intended as peer to peer communication. It is written in medical language and may contain abbreviations or verbiage that are unfamiliar. It may appear blunt or direct. Medical documents are intended to carry relevant information, facts as evident, and the clinical opinion of the practitioner.      DUANE Sawyer  [facility]

## 2024-02-03 LAB — BACTERIA SPEC AEROBE CULT: NORMAL

## 2024-02-08 ENCOUNTER — OFFICE VISIT (OUTPATIENT)
Dept: ORTHOPEDIC SURGERY | Facility: CLINIC | Age: 44
End: 2024-02-08
Payer: COMMERCIAL

## 2024-02-08 ENCOUNTER — HOSPITAL ENCOUNTER (OUTPATIENT)
Dept: GENERAL RADIOLOGY | Facility: HOSPITAL | Age: 44
Discharge: HOME | End: 2024-02-08
Admitting: PHYSICIAN ASSISTANT
Payer: COMMERCIAL

## 2024-02-08 ENCOUNTER — OFFICE VISIT (OUTPATIENT)
Age: 44
End: 2024-02-08
Payer: COMMERCIAL

## 2024-02-08 VITALS
BODY MASS INDEX: 38.97 KG/M2 | DIASTOLIC BLOOD PRESSURE: 88 MMHG | WEIGHT: 242.51 LBS | HEIGHT: 66 IN | SYSTOLIC BLOOD PRESSURE: 143 MMHG

## 2024-02-08 VITALS — HEIGHT: 66 IN | WEIGHT: 242.51 LBS | BODY MASS INDEX: 38.97 KG/M2

## 2024-02-08 DIAGNOSIS — M25.832 ULNAR ABUTMENT SYNDROME OF BOTH WRISTS: ICD-10-CM

## 2024-02-08 DIAGNOSIS — M79.671 RIGHT FOOT PAIN: Primary | ICD-10-CM

## 2024-02-08 DIAGNOSIS — M25.831 ULNAR ABUTMENT SYNDROME OF BOTH WRISTS: ICD-10-CM

## 2024-02-08 DIAGNOSIS — M25.531 PAIN IN BOTH WRISTS: ICD-10-CM

## 2024-02-08 DIAGNOSIS — M79.671 RIGHT FOOT PAIN: ICD-10-CM

## 2024-02-08 DIAGNOSIS — M25.532 PAIN IN BOTH WRISTS: ICD-10-CM

## 2024-02-08 DIAGNOSIS — M20.21 ACQUIRED HALLUX RIGIDUS, RIGHT: ICD-10-CM

## 2024-02-08 DIAGNOSIS — G56.22 CUBITAL TUNNEL SYNDROME ON LEFT: Primary | ICD-10-CM

## 2024-02-08 PROCEDURE — 73630 X-RAY EXAM OF FOOT: CPT

## 2024-02-08 NOTE — PROGRESS NOTES
[facility] Orthopedic     Office Visit       Date: 02/08/2024   Patient Name: Laura Weathers  MRN: [id number]  YOB: 1980    Referring Physician: Christiane Aguilar APRN     Chief Complaint:   Chief Complaint   Patient presents with    Left Wrist - Pain    Right Wrist - Pain     History of Present Illness:   Laura Weathers is a 43 y.o. female right-hand-dominant presented clinic with complaints of right greater than left hand and wrist pain.  She reports this has been present intermittently since 2018.  She reports her symptoms were worse during pregnancy and that improved afterwards. Symptoms have become more bothersome recently. She endorses mostly pain to the small and ring fingers of the hand.  She has been wearing carpal tunnel braces at night and when going to the gym with some improvements.  She denies any specific numbness or tingling to the thumb index or long fingers. Numbness is not her main complaint to the small or ring fingers, as it is mostly pain.  She has not attempted any prior injections, therapy, or elbow bracing.  She been using ibuprofen with some improvements.  No other complaints or concerns.    Subjective   Review of Systems:   Review of Systems   Pertinent review of systems per HPI    I reviewed the patient's chief complaint, history of present illness, review of systems, past medical history, surgical history, family history, social history, medications and allergy list in the EMR on 02/08/2024 and agree with the findings above.    Objective    Quality Measures:   ACP:   ACP discussion was declined by the patient.      Tobacco:   Laura Weathers  reports that she quit smoking about 22 years ago. Her smoking use included cigarettes. She has a 2.00 pack-year smoking history. She has never been exposed to tobacco smoke. She has never used smokeless tobacco..   Vital Signs:   Vitals:    02/08/24  "0922   Weight: 110 kg (242 lb 8.1 oz)   Height: 167.6 cm (65.98\")     BMI:      General: No acute distress. Alert and oriented.     Ortho Exam:  Examination of bilateral upper extremities demonstrates no deformity.  No skin lesions or abrasions.  No thenar or hypothenar atrophy.  She is able make composite fist and oppose the thumb to small finger.  The A1 pulleys are nontender to palpation.  At the right wrist she is nontender to the TFCC, ulnar styloid, ulnar fovea, ECU and DRUJ.  Negative TFCC ballottement on the right.  No pain with pronation or supination.  On the left wrist she is tender to the ECU and ulnar fovea.  Nontender along the ulnar styloid and DRUJ.  Negative TFCC ballottement on the left.  Some discomfort noted with maximal supination.  Sensation is intact light touch throughout bilateral hands.  The right long finger to point discrimination is 6 mm and 5 mm on the small finger.  Left long finger to point discrimination is 5 mm and 6 mm on the small finger.  Negative Tinel, Durkan's, Phalen's at bilateral wrist.  Negative Tinel's at the elbow.  Warm and well-perfused distally.    CTS-6 Questionnaire         Left Hand  Symptoms and History  Numbness in the median nerve territory  0 (3.5)   Nocturnal numbness     4 (4)   Physical Examination  Thenar atrophy and/or weakness   0 (5)    Positive Phalen's test     0 (5)   Loss of 2-point Discrimination >5 mm  0 (4.5)  Positive Tinel sign     0 (4)                Total    9 (26)           Right Hand  Symptoms and History  Numbness in the median nerve territory  0 (3.5)   Nocturnal numbness     4 (4)   Physical Examination  Thenar atrophy and/or weakness   0 (5)    Positive Phalen's test     0 (5)   Loss of 2-point Discrimination >5 mm  4.5 (4.5)  Positive Tinel sign     0 (4)     Total    8.5 (26)    A patient with a score of > 12 has an 80% probability of electrodiagnostically positive carpal tunnel syndrome.     A patient with a score of > 5 has an 25% " probability of electrodiagnostically positive carpal tunnel syndrome.                Imaging / Studies:    Imaging Results (Last 24 Hours)       Procedure Component Value Units Date/Time    XR Wrist 3+ View Bilateral [[id number]] Resulted: 02/08/24 0937     Updated: 02/08/24 1003    Narrative:      Bilateral Wrist X-Ray    Indication: Pain    Views:  PA, Lateral, and Oblique     Comparison: None    Findings:  No fractures or dislocation.  There are cyst sand sclerosis noted at   bilateral lunate's and the patient is ulnar positive bilaterally, 2-3 mm.    No other significant findings noted.    Impression:   Evidence of bilateral ulnar abutment syndrome with changes noted in the   lunate and ulnar positivity.            Assessment / Plan    Assessment/Plan:   Laura Weathers is a 43 y.o. 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    I discussed with the patient their clinical and radiographic findings demonstrate bilateral ulnar abutment syndrome and possibly cubital tunnel syndrome.  We had a lengthy discussion regarding the pathophysiology of their diagnosis.  Given the patient's radiographic findings, of ulnar positivity with abutment of the distal ulna into the lunate, I feel that most of her ulnar-sided pain is likely due to her ulnar abutment syndrome.  She however does endorse pain in the small and ring fingers but without specific numbness or tingling.  I think it is worth obtaining an EMG study to discern if cubital tunnel may be contributing to the pain she is feeling in her small and ring fingers.  She has been seen by other providers and given a diagnosis of carpal tunnel syndrome, however this seems less likely based on my exam today.  Further electrodiagnostic studies will also provide clarity to this prior diagnosis.  I instructed her to continue bracing at night.  I instructed her to continue to monitor her symptoms specifically at the ulnar and dorsa; aspect of the  wrist with activities.  After expressing understanding of all options, the patient elects to proceed with continued wrist splinting and obtaining electrodiagnostic studies.  They were agreeable with the plan.  All questions and concerns were addressed.        ICD-10-CM ICD-9-CM   1. Cubital tunnel syndrome on left  G56.22 354.2   2. Ulnar abutment syndrome of both wrists  M25.831 719.83    M25.832    3. Pain in both wrists  M25.531 719.43    M25.532      Follow Up:   Return for Follow Up- After Testing.      Keri Falcon MD  [facility] Orthopedic & Hand Surgeon    Albendazole Counseling:  I discussed with the patient the risks of albendazole including but not limited to cytopenia, kidney damage, nausea/vomiting and severe allergy.  The patient understands that this medication is being used in an off-label manner.

## 2024-02-08 NOTE — PROGRESS NOTES
[facility] Orthopaedic Surgery Clinic Note        Subjective     Pain of the Right Foot      HPI    Laura Weathers is a 43 y.o. female.  This is a very pleasant patient here to discuss her right first MTP joint pain.  She reports that she had an injury a couple of years ago where she stubbed her toe and it got somewhat pulled medial.  She has had some intermittent pain since that time with weightbearing and walking in the first MTP joint.  She describes the pain as sharp and throbbing.  She does not have any rest pain or night pain.  She has treated with ibuprofen and over-the-counter orthotics.  Pain 5/10.  Here for further evaluation and treatment recommendations.    Past Medical History:   Diagnosis Date    Allergic     Depression     Follicular thyroid cancer 2017    T3N0M0 follicular thyroid cancer s/p two stage thyroidectomy and I -131 remnant ablation    Headache     Sinus    Heart attack     Hypertension     Postoperative hypothyroidism 2013    Preeclampsia     delivered, with a postpartum complication      Past Surgical History:   Procedure Laterality Date    CARDIAC CATHETERIZATION      CARDIAC SURGERY      stints     SECTION      SINUS SURGERY  10/2019    another in 3/2021    SINUS SURGERY Left 2021    THYROIDECTOMY  2013    two stage thyroidectomy      Family History   Problem Relation Age of Onset    Arrhythmia Mother     Hypertension Mother     Heart disease Mother     Arthritis Mother     Stroke Mother     COPD Mother     Celiac disease Father     Heart attack Sister 28        SCD    Heart disease Sister         Heart attack   (age 28)    Heart disease Brother         Heart attack in  (age 52)    Hypertension Brother     Breast cancer Neg Hx     Ovarian cancer Neg Hx      Social History     Socioeconomic History    Marital status:    Tobacco Use    Smoking status: Former     Packs/day: 0.50     Years: 4.00     Additional pack years: 0.00     Total  pack years: 2.00     Types: Cigarettes     Quit date: 2002     Years since quittin.1     Passive exposure: Never    Smokeless tobacco: Never    Tobacco comments:     Quit 20+ yrs ago   Vaping Use    Vaping Use: Never used   Substance and Sexual Activity    Alcohol use: No    Drug use: No    Sexual activity: Yes     Partners: Male     Birth control/protection: Condom, Pill      Current Outpatient Medications on File Prior to Visit   Medication Sig Dispense Refill    albuterol sulfate  (90 Base) MCG/ACT inhaler Inhale 2 puffs Every 6 (Six) Hours As Needed for Wheezing.      aspirin 81 MG EC tablet 1 tablet Daily.      Azelastine HCl 137 MCG/SPRAY solution 1 spray by Each Nare route Daily. 30 mL 2    citalopram (CeleXA) 20 MG tablet Take 1 tablet by mouth Daily. 30 tablet 2    clopidogrel (PLAVIX) 75 MG tablet Take 1 tablet by mouth Daily.      dilTIAZem CD (CARDIZEM CD) 120 MG 24 hr capsule Take 1 capsule by mouth Daily.      EPINEPHrine (EPIPEN) 0.3 MG/0.3ML solution auto-injector injection 0.3 mL As Needed.      fexofenadine (ALLEGRA) 180 MG tablet Take 1 tablet by mouth Daily.      fluticasone (FLONASE) 50 MCG/ACT nasal spray 2 sprays into the nostril(s) as directed by provider Daily. 16 g 2    hydroCHLOROthiazide (HYDRODIURIL) 12.5 MG tablet Take 1 tablet by mouth Daily. 30 tablet 3    Lipitor 80 MG tablet Take 1 tablet by mouth Daily.      norethindrone (Giovanna) 0.35 MG tablet Take 1 tablet by mouth Daily for 84 days. 84 tablet 3    potassium chloride (KLOR-CON) 20 MEQ packet Take 20 mEq by mouth Daily.      Semaglutide-Weight Management (Wegovy) 2.4 MG/0.75ML solution auto-injector Inject 2.4 mg under the skin into the appropriate area as directed 1 (One) Time Per Week. 3 mL 5    Synthroid 137 MCG tablet Take 1 tablet by mouth Every Morning. 90 tablet 1    traZODone (DESYREL) 50 MG tablet 1-2 to 1 tab qhs prn insomnia 30 tablet 3    vitamin D (ERGOCALCIFEROL) 1.25 MG (18806 UT) capsule capsule  "Take 1 capsule by mouth 1 (One) Time Per Week. 5 capsule 2     No current facility-administered medications on file prior to visit.      Allergies   Allergen Reactions    Prednisone Hives    Coreg [Carvedilol] Other (See Comments)     Liver side effect    Adhesive Tape Rash          Review of Systems   Constitutional: Negative.    HENT: Negative.     Eyes: Negative.    Respiratory: Negative.     Cardiovascular: Negative.    Gastrointestinal: Negative.    Endocrine: Negative.    Genitourinary: Negative.    Musculoskeletal:  Positive for arthralgias.   Skin: Negative.    Allergic/Immunologic: Negative.    Neurological: Negative.    Hematological: Negative.    Psychiatric/Behavioral: Negative.     All other systems reviewed and are negative.       I reviewed the patient's chief complaint, history of present illness, review of systems, past medical history, surgical history, family history, social history, medications and allergy list.        Objective      Physical Exam  /88   Ht 167.6 cm (65.98\")   Wt 110 kg (242 lb 8.1 oz)   BMI 39.16 kg/m²     Body mass index is 39.16 kg/m².    General  Mental Status - alert  General Appearance - cooperative, well groomed, not in acute distress  Orientation - Oriented X3  Build & Nutrition - well developed and well nourished  Posture - normal posture  Gait -normal       Ortho Exam  Right foot exam: Tender at the first MTP P joint with positive grind Patte test.  The remainder of the foot and ankle are nontender.  Normal range of motion with 5/5 motor strength.  Patient is able to, prior toes with pain in the right first MTP joint.  Able to walk on her heels.  Neurovascular intact distally.  Pulses 2+.    Imaging/Studies  Standing AP and lateral of the right foot 2/8/2024 show mild narrowing of the first MTP joint with metatarsus adductus and accessory navicular.  No acute bony findings.        Assessment    Assessment:  1. Right foot pain    2. Acquired hallux rigidus, " "right          Plan:  Recommend over-the-counter medication as needed for discomfort  2.  Right hallux rigidus.  I personally reviewed and interpreted the right foot x-rays from 2/8/2024 as noted above.  Mild narrowing of the first MTP joint with metatarsus adductus and accessory navicular.  I explained this is arthritis of the big toe.  It is commonly genetic, but may also occur after trauma (such as a turf toe injury).  It is often bilateral.  I explained that arthritis by definition is a loss of cartilage.  We do not make cartilage in our bodies as adults, once it begins to wear out it will  continue to wear out.  As this happens, the body puts extra bone around the joint.  We call this osteophyte formation- the common term is \"spur\".  However, this extra bone is not like a thorn sticking into the tissue causing pain, it is merely a marker that indicates the joint has lost cartilage.      We discussed treatment options I went over the patient information sheet:    Conservative treatment: 1. Turf toe orthotics.  2. NSAID (OTC Aleve, Advil, etc) when painful I also recommended voltaren gel which is now available over the counter.  3. Glucosamine and chondroitin might help. 4. Cortisone injection- the injection can be done every 6 months if it helps. 5.  We also discussed topical Voltaren gel    Surgery is reserved for failure of conservative treatment.      I have given the patient a prescription for custom orthotics with turf toe plate.  She would like to avoid injection at this time.  She will return to see me as needed.      Ama James PA-C  02/09/24  14:55 EST  "

## 2024-02-14 DIAGNOSIS — G56.22 CUBITAL TUNNEL SYNDROME ON LEFT: ICD-10-CM

## 2024-02-16 ENCOUNTER — OFFICE VISIT (OUTPATIENT)
Age: 44
End: 2024-02-16
Payer: COMMERCIAL

## 2024-02-16 VITALS
BODY MASS INDEX: 38.97 KG/M2 | WEIGHT: 242.51 LBS | SYSTOLIC BLOOD PRESSURE: 150 MMHG | DIASTOLIC BLOOD PRESSURE: 90 MMHG | HEIGHT: 66 IN

## 2024-02-16 DIAGNOSIS — M25.832 ULNAR ABUTMENT SYNDROME OF BOTH WRISTS: Primary | ICD-10-CM

## 2024-02-16 DIAGNOSIS — M25.831 ULNAR ABUTMENT SYNDROME OF BOTH WRISTS: Primary | ICD-10-CM

## 2024-03-14 DIAGNOSIS — E89.0 POSTOPERATIVE HYPOTHYROIDISM: ICD-10-CM

## 2024-03-14 RX ORDER — LEVOTHYROXINE SODIUM 137 MCG
137 TABLET ORAL EVERY MORNING
Qty: 90 TABLET | Refills: 0 | Status: SHIPPED | OUTPATIENT
Start: 2024-03-14

## 2024-03-14 NOTE — TELEPHONE ENCOUNTER
Rx Refill Note  Requested Prescriptions     Pending Prescriptions Disp Refills    Synthroid 137 MCG tablet 90 tablet 1     Sig: Take 1 tablet by mouth Every Morning.      Last office visit with prescribing clinician: 11/7/2023   Last telemedicine visit with prescribing clinician: Visit date not found   Next office visit with prescribing clinician: 4/8/2024                         Would you like a call back once the refill request has been completed: [] Yes [] No    If the office needs to give you a call back, can they leave a voicemail: [] Yes [] No    Belem Alcala MA  03/14/24, 10:48 EDT

## 2024-04-08 ENCOUNTER — OFFICE VISIT (OUTPATIENT)
Dept: ENDOCRINOLOGY | Facility: CLINIC | Age: 44
End: 2024-04-08
Payer: COMMERCIAL

## 2024-04-08 VITALS
HEIGHT: 66 IN | DIASTOLIC BLOOD PRESSURE: 70 MMHG | HEART RATE: 81 BPM | SYSTOLIC BLOOD PRESSURE: 132 MMHG | BODY MASS INDEX: 38.73 KG/M2 | WEIGHT: 241 LBS

## 2024-04-08 DIAGNOSIS — C73 THYROID CANCER: Primary | ICD-10-CM

## 2024-04-08 DIAGNOSIS — E89.0 POSTOPERATIVE HYPOTHYROIDISM: ICD-10-CM

## 2024-04-08 DIAGNOSIS — E66.01 CLASS 3 SEVERE OBESITY WITH SERIOUS COMORBIDITY AND BODY MASS INDEX (BMI) OF 40.0 TO 44.9 IN ADULT, UNSPECIFIED OBESITY TYPE: ICD-10-CM

## 2024-04-08 LAB
T4 FREE SERPL-MCNC: 1.59 NG/DL (ref 0.93–1.7)
TSH SERPL DL<=0.05 MIU/L-ACNC: 0.88 UIU/ML (ref 0.27–4.2)

## 2024-04-08 PROCEDURE — 84432 ASSAY OF THYROGLOBULIN: CPT | Performed by: INTERNAL MEDICINE

## 2024-04-08 PROCEDURE — 86800 THYROGLOBULIN ANTIBODY: CPT | Performed by: INTERNAL MEDICINE

## 2024-04-08 PROCEDURE — 84439 ASSAY OF FREE THYROXINE: CPT | Performed by: INTERNAL MEDICINE

## 2024-04-08 PROCEDURE — 99214 OFFICE O/P EST MOD 30 MIN: CPT | Performed by: INTERNAL MEDICINE

## 2024-04-08 PROCEDURE — 84443 ASSAY THYROID STIM HORMONE: CPT | Performed by: INTERNAL MEDICINE

## 2024-04-08 RX ORDER — SEMAGLUTIDE 2.4 MG/.75ML
2.4 INJECTION, SOLUTION SUBCUTANEOUS WEEKLY
Qty: 3 ML | Refills: 5 | Status: SHIPPED | OUTPATIENT
Start: 2024-04-08

## 2024-04-08 RX ORDER — LOSARTAN POTASSIUM 50 MG/1
1 TABLET ORAL EVERY 12 HOURS SCHEDULED
COMMUNITY
Start: 2024-03-19

## 2024-04-08 NOTE — PROGRESS NOTES
Chief Complaint   Patient presents with    Thyroid Cancer    Hypothyroidism    Obesity     Follow-up       HPI:   Laura Weathers is a 44 y.o.female who returns to Endocrine Clinic for f/u evaluation of her hypothyroidism and follicular thyroid cancer, and obesity. Last visit 11/07/2023. Her history is as follows:    Interim Events:   - Pt recently took an older Rx of Wegovy 1.7 mg for a few weeks by mistake. She just restarted the 2.4 mg weekly dose.     1) T3N0M0 follicular thyroid carcinoma:  - found to have a palpable left lobe nodule/goiter on physical exam during pregnancy in 2013. Post partum she had FNA of a 1.5 cm left lobe nodule by Dr. Phoenix Hanna. Cytology showed follicular neoplasm and excision was recommended.  - pt underwent a two stage thyroidectomy in 06/26/2013 and 07/01/2013    - (06/26/2013) s/p left thyroid lobectomy and isthmusectomy at [facility]   Surgical Pathology: T3N0Mx Follicular carcinoma  - Left lobe tumor, 1.8 cm in greatest dimension.  * Tumor capsule present  *Tumor capsule invasion present  *Lymph-vascular invasion present  *Focal extrathyroidal extension identified.  *Focal angioinvasion by neoplasm identified.  *Margins free of neoplasm.  * (0/4) Four peripheral lymph nodes negative for metastatic carcinoma.     - (07/01/2013) s/p completion right lobectomy.  Surgical Pathology: variably sized colloid filled follicles without atypia or tumor involvement. No parathyroid glands or lymph nodes are seen .     Postoperative Course:  - (07/24/2013): Received 105.3 mCi of I-131 at [facility] for remnant ablation ordered by Dr. Phoenix Hanna. Pt was off thyroid hormone and prepped with 1-2 weeks of a low iodine diet. TSH was 75, TG was not collected prior to treatment  - (08/13/2014) Thyrogen Stimulated I-131 WBS ([facility]) - negative scan, stimulated TG was 1.9  - (12/09/2015)  In office neck US by Dr. Hanna was negative  - (08/15/2016) Thyrogen Stimulated I-131 WBS (  [facility]) - negative scan, stimulated TG was 1.7    LAB Summary:  (11/07/2023) TG 0.4, TG Abs <1.0 ([facility], [facility]), TSH 1.680 (0.270 - 4.200), FT4 1.27 (0.93 - 1.70) - on 137 mcg  (11/30/2022) TG 0.4, TG Abs <1.0 ([facility], [facility]), TSH 1.220 (0.270 - 4.200), FT4 1.63 (0.93 - 1.70) - on 150 mcg  (11/23/2020) TG 0.3, TG Abs <1.0 ([facility]), TSH 0.034 (0.270 - 4.200), FT4 1.71 (0.93 - 1.70) - on 175 mcg  (05/29/2019) TG 0.3, TG Abs <1.0 ([facility]), TSH 0.478 (0.270 - 4.200), FT4 1.53 (0.93 - 1.70)  (11/28/2019) TG 0.3, TG Abs <1.0 ([facility]), TSH 0.038 (0.350 - 5.350), FT4 1.49 (0.89 - 1.76)  (08/17/2016) stimulated TG 1.7  (12/08/2015) TG 0.4, TSH 0.13  (05/09/2015) TG 0.3, TSH 0.02  (08/13/2014) stimulated TG 1.9    Imaging:   (05/2018) Neck US in clinic - negative     2) postoperative hypothyroidism:  - was on suppressive thyroid hormone therapy with levothyroxine 175 mcg daily but pt could not tolerate. Alternating doses of 175 and 150 which is approx 164 mcg daily kept her TSH at 0.477,  Doses have fluctuated related to GI issues from food allergies    (08/31/2023) TSH 0.10 (0.36 - 3.74), free T4 1.79 (0.76 - 1.46). Decreased Synthroid form 150 to 137 due to concerns of symptoms at that time     Current Dose: Brand Synthroid 137 mcg qAM  Tolerating this regimen  Takes in AM on an empty stomach.  No food or hot liquids for at least 30 minutes  Not on a high-dose biotin supplement  No missed doses    3) Obesity, Class 3:  - (11/30/2022) Wt 286 lbs, BMI 46.2 - Started Wegovy in 12/2022.     Current Dose: Wegovy 2.4 mg weekly.   Tolerating. Occasional acid reflux. Denies N/V/D or constipation   Has lost 45 lbs, current BMI 38.92    Other History:   - Patient followed by allergy immunology. Has multiple food allergies that were causing GI symptoms. Allergy injections and dietary changes have helped symptoms.   - Had sinus polyps removed in 03/2021  - s/p MI in 05/2023. S/P 3 stents in LAD at [facility]    Review of Systems  "  Constitutional:  Positive for fatigue (intermittent).        Wt loss   HENT: Negative.     Eyes: Negative.  Negative for itching.   Respiratory: Negative.     Cardiovascular: Negative.  Negative for palpitations.   Gastrointestinal:  Negative for constipation, diarrhea, nausea and vomiting.   Endocrine: Negative.    Genitourinary: Negative.    Musculoskeletal: Negative.    Skin: Negative.    Allergic/Immunologic: Positive for environmental allergies and food allergies.   Neurological: Negative.    Hematological: Negative.    Psychiatric/Behavioral: Negative.         The following portions of the patient's history were reviewed and updated as appropriate: allergies, current medications, past family history, past medical history, past social history, past surgical history and problem list.      /70   Pulse 81   Ht 167.6 cm (65.98\")   Wt 109 kg (241 lb)   BMI 38.92 kg/m²   Physical Exam   Constitutional: She is oriented to person, place, and time. She appears well-developed. No distress.   HENT:   Head: Normocephalic.   Eyes: Pupils are equal, round, and reactive to light. Conjunctivae are normal.   Neck: No tracheal deviation present.   No palpable thyroid tissue     Cardiovascular: Normal rate, regular rhythm and normal heart sounds.   No murmur heard.  Pulmonary/Chest: Effort normal and breath sounds normal. No respiratory distress.   Abdominal: Normal appearance.   Lymphadenopathy:     She has no cervical adenopathy.   Neurological: She is alert and oriented to person, place, and time. No cranial nerve deficit.   Skin: Skin is warm and dry. She is not diaphoretic. No erythema.   Psychiatric: Her behavior is normal.   Vitals reviewed.      LABS/IMAGING: outside records reviewed and summarized in [facility]  Office Visit on 04/08/2024   Component Date Value Ref Range Status    Thyroglobulin Ab 04/08/2024 <1.0  0.0 - 0.9 IU/mL Final    Thyroglobulin Antibody measured by [facility] Christian[facility] Methodology  It should be " noted that the presence of thyroglobulin antibodies  may not be pathogenic nor diagnostic, especially at very low  levels. The assay  has found that four percent of  individuals without evidence of thyroid disease or autoimmunity  will have positive TgAb levels up to 4 IU/mL.    TSH 04/08/2024 0.878  0.270 - 4.200 uIU/mL Final    Free T4 04/08/2024 1.59  0.93 - 1.70 ng/dL Final    THYROGLOBULIN BY [facility] 04/08/2024 0.3 (L)  1.5 - 38.5 ng/mL Final    According to the National Academy of Clinical Biochemistry, the  reference interval for Thyroglobulin (TG) should be related to  euthyroid patients and not for patients who underwent thyroidectomy.  TG reference intervals for these patients depend on the residual  mass of the thyroid tissue left after surgery. Establishing a  post-operative baseline is recommended.  The assay limit of quantitation is 0.1 ng/mL  Thyroglobulin measured by [facility] Christiano Immunometric Assay       ASSESSMENT/PLAN:  1) T3N0M0 follicular carcinoma: no evidence of disease recurrence at this time    - diagnosed 06/23/2013, s/p two stage thyroidectomy, and 105.3 mCi of I-131 for remnant ablation by her former endocrinologist   - have discussed the current American Thyroid Association current guidelines for differentiated thyroid cancer management.  - using the REUBEN risk stratification system to estimate risk of persistent/recurrent disease, Ms. Weathers is in the intermediate risk category for persistent/recurrent disease    - discussed with patient that it is reassuring that her TG has remained stable at 0.3 - 0.5 for many years.    The overall plan will be to monitor for recurrent disease with TG levels every 12 months. Neck US to be performed as indicated. Her goal TSH will be between at the lower end of the normal range as tolerated    (04/08/2024) TG 0.3, TG Abs <1.0 ([facility], [facility]), TSH 0.878 (0.270 - 4.200), FT4 1.59 (0.93 - 1.70) - on 137 mcg    2) postoperative hypothyroidism:  -  was on suppressive thyroid hormone therapy with levothyroxine 175 mcg daily but pt could not tolerate.  - Her TSH levels had fluctuated in 2020.  This may possibly be related to GI symptoms she has had related to food allergies.   - TSH today was WNL at 0.878  - Will continue Brand Synthroid 137 mcg qAM    3) Obesity, Class 3  Has lost 45 lbs, current BMI 38.92  - continue Wegovy to 2.4 mg weekly.  Reviewed potential side effects. Pt to notify me if medication not tolerated  - encouraged continued incorporation of dietary changes and regular exercise    RTC 4-5 months    Electronically Signed: Valeria Lara MD

## 2024-04-10 LAB
THYROGLOB AB SERPL-ACNC: <1 IU/ML (ref 0–0.9)
THYROGLOB SERPL-MCNC: 0.3 NG/ML (ref 1.5–38.5)

## 2024-04-13 DIAGNOSIS — F33.0 MILD EPISODE OF RECURRENT DEPRESSIVE DISORDER: Chronic | ICD-10-CM

## 2024-04-13 RX ORDER — LEVOTHYROXINE SODIUM 137 MCG
137 TABLET ORAL EVERY MORNING
Qty: 90 TABLET | Refills: 3 | Status: SHIPPED | OUTPATIENT
Start: 2024-04-13

## 2024-04-13 RX ORDER — CITALOPRAM 20 MG/1
20 TABLET ORAL DAILY
Qty: 30 TABLET | Refills: 2 | Status: SHIPPED | OUTPATIENT
Start: 2024-04-13

## 2024-04-17 DIAGNOSIS — E55.9 VITAMIN D DEFICIENCY: ICD-10-CM

## 2024-04-17 RX ORDER — ERGOCALCIFEROL 1.25 MG/1
50000 CAPSULE ORAL WEEKLY
Qty: 5 CAPSULE | Refills: 2 | Status: SHIPPED | OUTPATIENT
Start: 2024-04-17

## 2024-04-21 DIAGNOSIS — I10 ESSENTIAL HYPERTENSION: ICD-10-CM

## 2024-04-22 RX ORDER — HYDROCHLOROTHIAZIDE 12.5 MG/1
12.5 TABLET ORAL DAILY
Qty: 30 TABLET | Refills: 3 | Status: SHIPPED | OUTPATIENT
Start: 2024-04-22

## 2024-05-28 DIAGNOSIS — Z76.0 MEDICATION REFILL: ICD-10-CM

## 2024-05-28 DIAGNOSIS — J30.9 ALLERGIC RHINITIS, UNSPECIFIED SEASONALITY, UNSPECIFIED TRIGGER: ICD-10-CM

## 2024-05-28 RX ORDER — AZELASTINE HYDROCHLORIDE 137 UG/1
SPRAY, METERED NASAL
Qty: 30 ML | Refills: 0 | Status: SHIPPED | OUTPATIENT
Start: 2024-05-28

## 2024-06-05 ENCOUNTER — TELEPHONE (OUTPATIENT)
Dept: OBSTETRICS AND GYNECOLOGY | Facility: CLINIC | Age: 44
End: 2024-06-05
Payer: COMMERCIAL

## 2024-06-05 RX ORDER — ACETAMINOPHEN AND CODEINE PHOSPHATE 120; 12 MG/5ML; MG/5ML
1 SOLUTION ORAL DAILY
Qty: 28 TABLET | Refills: 12 | Status: SHIPPED | OUTPATIENT
Start: 2024-06-05 | End: 2025-06-05

## 2024-06-05 NOTE — TELEPHONE ENCOUNTER
Patient stated the pharmacy has switched her to multiple kinds. She states she did not like Giovanna or Jenycla. The most recent was just generic norethindrone 0.35 which was slightly better but she is looking to not have a period at all.

## 2024-06-05 NOTE — TELEPHONE ENCOUNTER
Patient called stating her pharmacy told her they discontinued her Sharobel birth control and switched her to another brand. Since patient was switched she stated she has been very irritable and having mood swings. She has also started bleeding since starting her birth control which she did not have any cycles on the Sharobel. Patient would like another birth control pill called in for her. She has an annual scheduled for 7/1/24

## 2024-06-05 NOTE — TELEPHONE ENCOUNTER
I will send another POP in but she needs additional work up as this may not be just the pill.  Can you get her added on for US when she comes in for her annual?

## 2024-06-06 NOTE — TELEPHONE ENCOUNTER
Made patient an US appointment. LVM stating to call office back to inform her about appointment and to call if we need to reschedule

## 2024-06-11 ENCOUNTER — LAB (OUTPATIENT)
Dept: LAB | Facility: HOSPITAL | Age: 44
End: 2024-06-11
Payer: COMMERCIAL

## 2024-06-11 ENCOUNTER — TRANSCRIBE ORDERS (OUTPATIENT)
Dept: LAB | Facility: HOSPITAL | Age: 44
End: 2024-06-11
Payer: COMMERCIAL

## 2024-06-11 ENCOUNTER — TELEPHONE (OUTPATIENT)
Dept: ENDOCRINOLOGY | Facility: CLINIC | Age: 44
End: 2024-06-11
Payer: COMMERCIAL

## 2024-06-11 DIAGNOSIS — E66.9 OBESITY, UNSPECIFIED CLASSIFICATION, UNSPECIFIED OBESITY TYPE, UNSPECIFIED WHETHER SERIOUS COMORBIDITY PRESENT: Primary | ICD-10-CM

## 2024-06-11 DIAGNOSIS — E66.9 OBESITY, UNSPECIFIED CLASSIFICATION, UNSPECIFIED OBESITY TYPE, UNSPECIFIED WHETHER SERIOUS COMORBIDITY PRESENT: ICD-10-CM

## 2024-06-11 DIAGNOSIS — E66.01 CLASS 3 SEVERE OBESITY WITH SERIOUS COMORBIDITY AND BODY MASS INDEX (BMI) OF 40.0 TO 44.9 IN ADULT, UNSPECIFIED OBESITY TYPE: Primary | ICD-10-CM

## 2024-06-11 LAB — HBA1C MFR BLD: 4.4 % (ref 4.8–5.6)

## 2024-06-11 PROCEDURE — 36415 COLL VENOUS BLD VENIPUNCTURE: CPT

## 2024-06-11 PROCEDURE — 80053 COMPREHEN METABOLIC PANEL: CPT

## 2024-06-11 PROCEDURE — 84443 ASSAY THYROID STIM HORMONE: CPT

## 2024-06-11 PROCEDURE — 83036 HEMOGLOBIN GLYCOSYLATED A1C: CPT

## 2024-06-11 PROCEDURE — 80061 LIPID PANEL: CPT

## 2024-06-11 NOTE — TELEPHONE ENCOUNTER
Patient called office, stated that she is wanting to speak to Dr. Lara regarding her wegovy. She would like a call back.

## 2024-06-11 NOTE — TELEPHONE ENCOUNTER
- pt contacted me regarding weight loss plateau on the 2.4 mg wegovy dose despite regular exercise and dietary changes. Zepbound is now on her formulary. Will send Rx for Zepbound 12.5 mg weekly and increase after 1-2 months to 15 mg  if tolerated.   Electronically Signed: Valeria Lara MD

## 2024-06-12 ENCOUNTER — TELEPHONE (OUTPATIENT)
Dept: ENDOCRINOLOGY | Facility: CLINIC | Age: 44
End: 2024-06-12
Payer: COMMERCIAL

## 2024-06-12 LAB
ALBUMIN SERPL-MCNC: 4.2 G/DL (ref 3.5–5.2)
ALBUMIN/GLOB SERPL: 1.9 G/DL
ALP SERPL-CCNC: 91 U/L (ref 39–117)
ALT SERPL W P-5'-P-CCNC: 18 U/L (ref 1–33)
ANION GAP SERPL CALCULATED.3IONS-SCNC: 9 MMOL/L (ref 5–15)
AST SERPL-CCNC: 14 U/L (ref 1–32)
BILIRUB SERPL-MCNC: 0.7 MG/DL (ref 0–1.2)
BUN SERPL-MCNC: 10 MG/DL (ref 6–20)
BUN/CREAT SERPL: 11.5 (ref 7–25)
CALCIUM SPEC-SCNC: 8.9 MG/DL (ref 8.6–10.5)
CHLORIDE SERPL-SCNC: 105 MMOL/L (ref 98–107)
CHOLEST SERPL-MCNC: 80 MG/DL (ref 0–200)
CO2 SERPL-SCNC: 26 MMOL/L (ref 22–29)
CREAT SERPL-MCNC: 0.87 MG/DL (ref 0.57–1)
EGFRCR SERPLBLD CKD-EPI 2021: 83.3 ML/MIN/1.73
GLOBULIN UR ELPH-MCNC: 2.2 GM/DL
GLUCOSE SERPL-MCNC: 73 MG/DL (ref 65–99)
HDLC SERPL-MCNC: 34 MG/DL (ref 40–60)
LDLC SERPL CALC-MCNC: 34 MG/DL (ref 0–100)
LDLC/HDLC SERPL: 1.08 {RATIO}
POTASSIUM SERPL-SCNC: 4.6 MMOL/L (ref 3.5–5.2)
PROT SERPL-MCNC: 6.4 G/DL (ref 6–8.5)
SODIUM SERPL-SCNC: 140 MMOL/L (ref 136–145)
TRIGL SERPL-MCNC: 46 MG/DL (ref 0–150)
TSH SERPL DL<=0.05 MIU/L-ACNC: 1.84 UIU/ML (ref 0.27–4.2)
VLDLC SERPL-MCNC: 12 MG/DL (ref 5–40)

## 2024-06-12 NOTE — TELEPHONE ENCOUNTER
PT CALLED STATING ZEPBOUND REQUIRES A PA. I ENCOURAGED HER TO REACH OUT TO HER PHARM TO HAVE THEM SEND US DENIAL.

## 2024-06-13 NOTE — TELEPHONE ENCOUNTER
Spoke to pt let her know that we have not received that denial from pharmacy yet, will inform her when we do. Pt stated she is going to contact pharmacy again, states may or may not need PA anymore but will keep us updated.

## 2024-06-14 ENCOUNTER — TELEPHONE (OUTPATIENT)
Dept: ENDOCRINOLOGY | Facility: CLINIC | Age: 44
End: 2024-06-14

## 2024-06-14 NOTE — TELEPHONE ENCOUNTER
Pt states they were able to submit information to pharmacy last night and they were able to fill this for her, wanted to let us know so we didn't have to work on PA for her.

## 2024-06-20 ENCOUNTER — OFFICE VISIT (OUTPATIENT)
Dept: FAMILY MEDICINE CLINIC | Facility: CLINIC | Age: 44
End: 2024-06-20
Payer: COMMERCIAL

## 2024-06-20 ENCOUNTER — LAB (OUTPATIENT)
Dept: LAB | Facility: HOSPITAL | Age: 44
End: 2024-06-20
Payer: COMMERCIAL

## 2024-06-20 VITALS
TEMPERATURE: 98 F | WEIGHT: 241.2 LBS | HEIGHT: 66 IN | HEART RATE: 85 BPM | BODY MASS INDEX: 38.76 KG/M2 | OXYGEN SATURATION: 99 % | SYSTOLIC BLOOD PRESSURE: 120 MMHG | DIASTOLIC BLOOD PRESSURE: 80 MMHG

## 2024-06-20 DIAGNOSIS — M25.542 ARTHRALGIA OF BOTH HANDS: ICD-10-CM

## 2024-06-20 DIAGNOSIS — M25.541 ARTHRALGIA OF BOTH HANDS: ICD-10-CM

## 2024-06-20 DIAGNOSIS — M25.541 ARTHRALGIA OF RIGHT HAND: ICD-10-CM

## 2024-06-20 DIAGNOSIS — Z00.00 ANNUAL PHYSICAL EXAM: Primary | ICD-10-CM

## 2024-06-20 LAB
CHROMATIN AB SERPL-ACNC: <10 IU/ML (ref 0–14)
ERYTHROCYTE [SEDIMENTATION RATE] IN BLOOD: 5 MM/HR (ref 0–20)
URATE SERPL-MCNC: 3.5 MG/DL (ref 2.4–5.7)

## 2024-06-20 PROCEDURE — 84550 ASSAY OF BLOOD/URIC ACID: CPT

## 2024-06-20 PROCEDURE — 86431 RHEUMATOID FACTOR QUANT: CPT

## 2024-06-20 PROCEDURE — 85652 RBC SED RATE AUTOMATED: CPT

## 2024-06-20 PROCEDURE — 99396 PREV VISIT EST AGE 40-64: CPT | Performed by: NURSE PRACTITIONER

## 2024-06-20 NOTE — PROGRESS NOTES
Follow Up Office Note   Patient Name: Laura Weathers  : 1980   MRN: [id number]     Chief Complaint:    Chief Complaint   Patient presents with    Annual Exam    Arthritis       History of Present Illness:   Laura Weathers is a 44 y.o. female who presents today for annual physical exam. Patient describes her overall health as good, however she is c/o arthralgia in her hands R > L. She reports pain seems to be worsening over the past several weeks.        Subjective   I have reviewed and the following portions of the patient's history were updated as appropriate: past family history, past medical history, past social history, past surgical history and problem list.    Review of Systems:   Review of Systems   Constitutional: Negative.    HENT: Negative.     Eyes: Negative.    Respiratory: Negative.     Cardiovascular:  Negative for chest pain, palpitations and leg swelling.   Gastrointestinal: Negative.    Genitourinary: Negative.    Musculoskeletal:  Positive for arthralgias.   Neurological: Negative.    Psychiatric/Behavioral: Negative.          Past Medical History:   Past Medical History:   Diagnosis Date    Allergic     Depression     Follicular thyroid cancer 2017    T3N0M0 follicular thyroid cancer s/p two stage thyroidectomy and I -131 remnant ablation    Headache     Sinus    Heart attack     Hypertension     Postoperative hypothyroidism 2013    Preeclampsia     delivered, with a postpartum complication       Medications:     Current Outpatient Medications:     albuterol sulfate  (90 Base) MCG/ACT inhaler, Inhale 2 puffs Every 6 (Six) Hours As Needed for Wheezing., Disp: , Rfl:     aspirin 81 MG EC tablet, 1 tablet Daily., Disp: , Rfl:     Azelastine HCl 137 MCG/SPRAY solution, INSTILL 1 SPRAY IN EACH NOSTRIL EVERY DAY, Disp: 30 mL, Rfl: 0    citalopram (CeleXA) 20 MG tablet, TAKE ONE TABLET BY MOUTH EVERY DAY, Disp: 30 tablet, Rfl: 2    clopidogrel (PLAVIX) 75 MG  tablet, Take 1 tablet by mouth Daily., Disp: , Rfl:     EPINEPHrine (EPIPEN) 0.3 MG/0.3ML solution auto-injector injection, 0.3 mL As Needed., Disp: , Rfl:     fexofenadine (ALLEGRA) 180 MG tablet, Take 1 tablet by mouth Daily., Disp: , Rfl:     fluticasone (FLONASE) 50 MCG/ACT nasal spray, 2 sprays into the nostril(s) as directed by provider Daily., Disp: 16 g, Rfl: 2    hydroCHLOROthiazide 12.5 MG tablet, TAKE ONE TABLET BY MOUTH EVERY DAY, Disp: 30 tablet, Rfl: 3    Lipitor 80 MG tablet, Take 1 tablet by mouth Daily., Disp: , Rfl:     losartan (COZAAR) 50 MG tablet, Take 1 tablet by mouth Every 12 (Twelve) Hours., Disp: , Rfl:     potassium chloride (KLOR-CON) 20 MEQ packet, Take 20 mEq by mouth Daily., Disp: , Rfl:     Synthroid 137 MCG tablet, Take 1 tablet by mouth Every Morning., Disp: 90 tablet, Rfl: 3    Tirzepatide-Weight Management (ZEPBOUND) 12.5 MG/0.5ML solution auto-injector, Inject 0.5 mL under the skin into the appropriate area as directed 1 (One) Time Per Week., Disp: 6 mL, Rfl: 1    traZODone (DESYREL) 50 MG tablet, 1-2 to 1 tab qhs prn insomnia, Disp: 30 tablet, Rfl: 3    dilTIAZem CD (CARDIZEM CD) 120 MG 24 hr capsule, Take 1 capsule by mouth Daily. (Patient not taking: Reported on 6/20/2024), Disp: , Rfl:     norethindrone (Giovanna) 0.35 MG tablet, Take 1 tablet by mouth Daily for 84 days., Disp: 84 tablet, Rfl: 3    norethindrone (MICRONOR) 0.35 MG tablet, Take 1 tablet by mouth Daily. (Patient not taking: Reported on 6/20/2024), Disp: 28 tablet, Rfl: 12    vitamin D (ERGOCALCIFEROL) 1.25 MG (74586 UT) capsule capsule, TAKE ONE CAPSULE BY MOUTH ONCE WEEKLY, Disp: 5 capsule, Rfl: 2    Allergies:   Allergies   Allergen Reactions    Prednisone Hives    Coreg [Carvedilol] Other (See Comments)     Liver side effect    Adhesive Tape Rash         6/20/2024     8:55 AM   PHQ-2/PHQ-9 Depression Screening   Little Interest or Pleasure in Doing Things 0-->not at all   Feeling Down, Depressed or Hopeless  "0-->not at all   PHQ-9: Brief Depression Severity Measure Score 0          Objective   Physical Exam:  Vital Signs:   Vitals:    06/20/24 0855   BP: 120/80   Pulse: 85   Temp: 98 °F (36.7 °C)   TempSrc: Temporal   SpO2: 99%   Weight: 109 kg (241 lb 3.2 oz)   Height: 167.6 cm (65.98\")   PainSc: 0-No pain     Body mass index is 38.95 kg/m².     Physical Exam  Vitals and nursing note reviewed. Exam conducted with a chaperone present.   Constitutional:       General: She is not in acute distress.     Appearance: Normal appearance. She is well-developed and well-groomed. She is not ill-appearing, toxic-appearing or diaphoretic.   HENT:      Head: Normocephalic and atraumatic.      Right Ear: Tympanic membrane, ear canal and external ear normal.      Left Ear: Tympanic membrane, ear canal and external ear normal.      Nose: Nose normal.      Mouth/Throat:      Lips: Pink.      Mouth: Mucous membranes are moist.      Pharynx: Oropharynx is clear. Uvula midline. No posterior oropharyngeal erythema.   Neck:      Thyroid: No thyroid mass, thyromegaly or thyroid tenderness.   Cardiovascular:      Rate and Rhythm: Normal rate and regular rhythm.      Pulses: Normal pulses.      Heart sounds: Normal heart sounds, S1 normal and S2 normal. No murmur heard.  Pulmonary:      Effort: Pulmonary effort is normal. No respiratory distress.      Breath sounds: Normal breath sounds.   Abdominal:      General: Bowel sounds are normal. There is no distension.      Palpations: Abdomen is soft.      Tenderness: There is no abdominal tenderness.   Musculoskeletal:         General: Normal range of motion.      Cervical back: Normal range of motion and neck supple.      Right lower leg: No edema.      Left lower leg: No edema.   Lymphadenopathy:      Cervical: No cervical adenopathy.   Skin:     General: Skin is warm and dry.      Capillary Refill: Capillary refill takes less than 2 seconds.      Findings: No rash.   Neurological:      General: " No focal deficit present.      Mental Status: She is alert and oriented to person, place, and time. Mental status is at baseline.   Psychiatric:         Attention and Perception: Attention and perception normal.         Mood and Affect: Mood and affect normal.         Speech: Speech normal.         Behavior: Behavior normal. Behavior is cooperative.         Thought Content: Thought content normal.         Cognition and Memory: Cognition and memory normal.         Judgment: Judgment normal.         Assessment / Plan    Assessment/Plan:   Diagnoses and all orders for this visit:    1. Annual physical exam (Primary)    2. Arthralgia of both hands  Assessment & Plan:  Concern for possible RA and/or gouty arthritis. Labs per orders. Further recommendation after results evaluation.     Orders:  -     Rheumatoid Factor; Future  -     Sedimentation Rate; Future  -     Uric Acid; Future       The patient is here for a health maintenance visit.  Currently, the patient consumes a mostly healthy diet and has an adequate exercise regimen. Screening lab work is ordered.  Immunizations are declined today and vaccine education is provided.  Advice and education is given regarding nutrition, aerobic exercise, routine dental evaluations, routine eye exams, reproductive health, cardiovascular risk reduction, sunscreen use, self skin examination (annual dermatology evaluations) and seat belt use (general overall safety).  Further recommendations after lab evaluation.  Annual wellness evaluations recommended.       Discussed the nature of the medical condition(s) risks, complications, implications, management, safe and proper use of medications. Encouraged medication compliance, and keeping scheduled follow up appointments with me and any other providers.      RTC if symptoms fail to improve, to ER if symptoms worsen.      *Dictated Utilizing Dragon Dictation   Please note that portions of this note were completed with a voice  recognition program.   Part of this note may be an electronic transcription/translation of spoken language to printed text using the Dragon Dictation System. Spelling and/or grammatical errors may exist despite efforts at proofreading.      NOTE TO PATIENT: The 21st Century Cures Act makes medical notes like these available to patients in the interest of transparency. However, be advised this is a medical document. It is intended as peer to peer communication. It is written in medical language and may contain abbreviations or verbiage that are unfamiliar. It may appear blunt or direct. Medical documents are intended to carry relevant information, facts as evident, and the clinical opinion of the practitioner.      DUANE Sawyer  [facility] [place]

## 2024-06-23 PROBLEM — M25.541 ARTHRALGIA OF BOTH HANDS: Status: ACTIVE | Noted: 2024-06-23

## 2024-06-23 PROBLEM — M25.542 ARTHRALGIA OF BOTH HANDS: Status: ACTIVE | Noted: 2024-06-23

## 2024-06-23 NOTE — ASSESSMENT & PLAN NOTE
Concern for possible RA and/or gouty arthritis. Labs per orders. Further recommendation after results evaluation.

## 2024-07-01 ENCOUNTER — OFFICE VISIT (OUTPATIENT)
Dept: OBSTETRICS AND GYNECOLOGY | Facility: CLINIC | Age: 44
End: 2024-07-01
Payer: COMMERCIAL

## 2024-07-01 VITALS
BODY MASS INDEX: 37.77 KG/M2 | WEIGHT: 235 LBS | DIASTOLIC BLOOD PRESSURE: 78 MMHG | SYSTOLIC BLOOD PRESSURE: 120 MMHG | HEIGHT: 66 IN

## 2024-07-01 DIAGNOSIS — N93.9 ABNORMAL UTERINE BLEEDING (AUB): Primary | ICD-10-CM

## 2024-07-01 DIAGNOSIS — F33.0 MILD EPISODE OF RECURRENT DEPRESSIVE DISORDER: Chronic | ICD-10-CM

## 2024-07-01 DIAGNOSIS — Z01.419 ENCOUNTER FOR ANNUAL ROUTINE GYNECOLOGICAL EXAMINATION: ICD-10-CM

## 2024-07-01 PROCEDURE — 99459 PELVIC EXAMINATION: CPT | Performed by: OBSTETRICS & GYNECOLOGY

## 2024-07-01 PROCEDURE — 99396 PREV VISIT EST AGE 40-64: CPT | Performed by: OBSTETRICS & GYNECOLOGY

## 2024-07-01 RX ORDER — CITALOPRAM 20 MG/1
20 TABLET ORAL DAILY
Qty: 30 TABLET | Refills: 2 | Status: SHIPPED | OUTPATIENT
Start: 2024-07-01

## 2024-07-01 NOTE — PROGRESS NOTES
Gynecologic Annual Exam Note        Gynecologic Exam        Subjective     HPI  Laura Weathers is a 44 y.o.  female who presents for annual well woman exam as a established patient. There were no changes to her medical or surgical history since her last visit.. No LMP recorded. (Menstrual status: Oral contraceptives).  irregular  The flow is heavy . She denies dysmenorrhea. Marital Status: .  She is sexually active. She has not had new partners.. STD testing recommendations have been explained to the patient and she does not desire STD testing.    The patient would like to discuss the following complaints today: Is taking remberto  (BC)currently The periods before this last one was heavy  with severe cramping states had hateful personality    US performed today . Reviewed findings including multiple small fibroids with thin endometrial linging    Additional OB/GYN History   contraceptive methods: OCP (estrogen/progesterone)  Desires to: continue contraception  Thromboembolic Disease: none  History of migraines: no  Age of menarche: 9    History of STD: no    Last Pap : 2023. Results: negative. HPV: negative.   Last Completed Pap Smear            PAP SMEAR (Every 3 Years) Next due on 2023  LIQUID-BASED PAP SMEAR WITH HPV GENOTYPING REGARDLESS OF INTERPRETATION (SILVESTRE,COR,MAD)    2016  SCANNED - PAP SMEAR    2016  Declined                     History of abnormal Pap smear: yes - 2004LEEP  Gardasil status:declines  Family history of uterine, colon, breast, or ovarian cancer: no  Performs monthly Self-Breast Exam: yes  Exercises Regularly:no  Feelings of Anxiety or Depression: yes - medication  Tobacco Usage?: No       Current Outpatient Medications:     albuterol sulfate  (90 Base) MCG/ACT inhaler, Inhale 2 puffs Every 6 (Six) Hours As Needed for Wheezing., Disp: , Rfl:     aspirin 81 MG EC tablet, 1 tablet Daily., Disp: , Rfl:     Azelastine HCl 137  MCG/SPRAY solution, INSTILL 1 SPRAY IN EACH NOSTRIL EVERY DAY, Disp: 30 mL, Rfl: 0    citalopram (CeleXA) 20 MG tablet, TAKE ONE TABLET BY MOUTH EVERY DAY, Disp: 30 tablet, Rfl: 2    clopidogrel (PLAVIX) 75 MG tablet, Take 1 tablet by mouth Daily., Disp: , Rfl:     EPINEPHrine (EPIPEN) 0.3 MG/0.3ML solution auto-injector injection, 0.3 mL As Needed., Disp: , Rfl:     fexofenadine (ALLEGRA) 180 MG tablet, Take 1 tablet by mouth Daily., Disp: , Rfl:     fluticasone (FLONASE) 50 MCG/ACT nasal spray, 2 sprays into the nostril(s) as directed by provider Daily., Disp: 16 g, Rfl: 2    hydroCHLOROthiazide 12.5 MG tablet, TAKE ONE TABLET BY MOUTH EVERY DAY, Disp: 30 tablet, Rfl: 3    Lipitor 80 MG tablet, Take 1 tablet by mouth Daily., Disp: , Rfl:     losartan (COZAAR) 50 MG tablet, Take 1 tablet by mouth Every 12 (Twelve) Hours., Disp: , Rfl:     norethindrone (MICRONOR) 0.35 MG tablet, Take 1 tablet by mouth Daily., Disp: 28 tablet, Rfl: 12    potassium chloride (KLOR-CON) 20 MEQ packet, Take 20 mEq by mouth Daily., Disp: , Rfl:     Synthroid 137 MCG tablet, Take 1 tablet by mouth Every Morning., Disp: 90 tablet, Rfl: 3    Tirzepatide-Weight Management (ZEPBOUND) 12.5 MG/0.5ML solution auto-injector, Inject 0.5 mL under the skin into the appropriate area as directed 1 (One) Time Per Week., Disp: 6 mL, Rfl: 1    traZODone (DESYREL) 50 MG tablet, 1-2 to 1 tab qhs prn insomnia, Disp: 30 tablet, Rfl: 3    vitamin D (ERGOCALCIFEROL) 1.25 MG (12545 UT) capsule capsule, TAKE ONE CAPSULE BY MOUTH ONCE WEEKLY, Disp: 5 capsule, Rfl: 2    dilTIAZem CD (CARDIZEM CD) 120 MG 24 hr capsule, Take 1 capsule by mouth Daily. (Patient not taking: Reported on 2024), Disp: , Rfl:     norethindrone (Giovanna) 0.35 MG tablet, Take 1 tablet by mouth Daily for 84 days., Disp: 84 tablet, Rfl: 3     Patient is requesting refills of OCP's.    OB History          2    Para   2    Term   2            AB        Living   2         SAB         IAB        Ectopic        Molar        Multiple        Live Births   2                Health Maintenance   Topic Date Due    COLORECTAL CANCER SCREENING  Never done    Annual Gynecologic Pelvic and Breast Exam  2024    COVID-19 Vaccine ( - -24 season) 2024 (Originally 2023)    INFLUENZA VACCINE  2024    LIPID PANEL  2025    BMI FOLLOWUP  2025    ANNUAL PHYSICAL  2025    MAMMOGRAM  2025    PAP SMEAR  2026    TDAP/TD VACCINES (2 - Td or Tdap) 2026    HEPATITIS C SCREENING  Completed    Pneumococcal Vaccine 0-64  Aged Out       Past Medical History:   Diagnosis Date    Abnormal Pap smear of cervix     Allergic     Depression     Follicular thyroid cancer 2017    T3N0M0 follicular thyroid cancer s/p two stage thyroidectomy and I -131 remnant ablation    Gestational hypertension     Headache     Sinus    Heart attack     Hypertension     Migraine     PMS (premenstrual syndrome)     Postoperative hypothyroidism 2013    Preeclampsia     delivered, with a postpartum complication        Past Surgical History:   Procedure Laterality Date    CARDIAC CATHETERIZATION      CARDIAC SURGERY      stints    CERVICAL BIOPSY  W/ LOOP ELECTRODE EXCISION       SECTION      SINUS SURGERY  10/2019    another in 3/2021    SINUS SURGERY Left 2021    THYROIDECTOMY      two stage thyroidectomy       The additional following portions of the patient's history were reviewed and updated as appropriate: allergies, current medications, past family history, past medical history, past social history, past surgical history, and problem list.    Review of Systems   Constitutional: Negative.    Respiratory: Negative.     Cardiovascular: Negative.    Gastrointestinal: Negative.    Genitourinary:  Positive for menstrual problem. Negative for breast discharge, breast lump, breast pain and pelvic pain.   Musculoskeletal: Negative.    Skin: Negative.   "  Neurological: Negative.    Psychiatric/Behavioral: Negative.           I have reviewed and agree with the HPI, ROS, and historical information as entered above. Humble Rodriguez MD          Objective   /78   Ht 167.6 cm (65.98\")   Wt 107 kg (235 lb)   BMI 37.95 kg/m²     Physical Exam  Vitals reviewed. Exam conducted with a chaperone present.   Constitutional:       Appearance: Normal appearance. She is obese.   HENT:      Head: Normocephalic and atraumatic.   Cardiovascular:      Rate and Rhythm: Normal rate and regular rhythm.   Pulmonary:      Effort: Pulmonary effort is normal.      Breath sounds: Normal breath sounds.   Chest:   Breasts:     Right: Normal.      Left: Normal.   Abdominal:      General: Abdomen is flat. Bowel sounds are normal.      Palpations: Abdomen is soft.   Genitourinary:     General: Normal vulva.      Vagina: Normal.      Cervix: Normal.      Uterus: Normal.       Adnexa: Right adnexa normal and left adnexa normal.            Comments: Small endocervical polyp noted on today's exam  Musculoskeletal:      Cervical back: Neck supple.   Skin:     General: Skin is warm and dry.   Neurological:      Mental Status: She is alert and oriented to person, place, and time.   Psychiatric:         Mood and Affect: Mood normal.         Behavior: Behavior normal.            Assessment and Plan    Problem List Items Addressed This Visit    None  Visit Diagnoses       Abnormal uterine bleeding (AUB)    -  Primary    Relevant Orders    US Non-ob Transvaginal    Encounter for annual routine gynecological examination                GYN annual well woman exam.   Reviewed pap guidelines.   OCP's/Vaginal Ring - Discussed side effects of nausea, BTB, headaches, breast tenderness and slight weight gain in the first three cycles.  Understands risks of blood clots, stroke, and theoretical risk of breast cancer.  Denies family history of blood clots.  Reviewed risks/benefits of hormonal contraception " after age 35, including possible increased risk of breast cancer, heart disease, blood clots and strokes.  Patient strongly desires to stay on hormonal contraception.  Recommended use of Vitamin D replacement and getting adequate calcium in her diet. (1500mg)  Reviewed monthly self breast exams.  Instructed to call with lumps, pain, or breast discharge.    Reviewed exercise as a preventative health measures.   Reccommended Flu Vaccine in Fall of each year.  RTC in 1 year or PRN with problems  Return in about 1 year (around 7/1/2025) for Annual physical.    Humble Rodriguez MD  07/01/2024

## 2024-07-20 DIAGNOSIS — E55.9 VITAMIN D DEFICIENCY: ICD-10-CM

## 2024-07-22 RX ORDER — ERGOCALCIFEROL 1.25 MG/1
50000 CAPSULE ORAL WEEKLY
Qty: 5 CAPSULE | Refills: 2 | Status: SHIPPED | OUTPATIENT
Start: 2024-07-22

## 2024-08-01 ENCOUNTER — TELEPHONE (OUTPATIENT)
Dept: ENDOCRINOLOGY | Facility: CLINIC | Age: 44
End: 2024-08-01
Payer: COMMERCIAL

## 2024-08-01 DIAGNOSIS — E66.01 CLASS 3 SEVERE OBESITY WITH SERIOUS COMORBIDITY AND BODY MASS INDEX (BMI) OF 40.0 TO 44.9 IN ADULT, UNSPECIFIED OBESITY TYPE: Primary | ICD-10-CM

## 2024-08-01 NOTE — TELEPHONE ENCOUNTER
Patient called office, stated that she is getting ready to take her last dose of zepbound. She stated she has tolerated the dose well, and would like to be moved up to the next dose.

## 2024-08-01 NOTE — TELEPHONE ENCOUNTER
She should have enough medication since she is getting ready to take her last weekly dose of Zepbound-- I will let Dr. Lara make the decision on the increase or not when she is back Monday.  RT

## 2024-08-01 NOTE — TELEPHONE ENCOUNTER
Spoke with patient. She has been tolerating her Mounjaro 12.5 mg well. Have sent a new Rx for the 15 mg dose to her pharmacy.  Electronically Signed: Valeria Lara MD

## 2024-08-12 DIAGNOSIS — I10 ESSENTIAL HYPERTENSION: ICD-10-CM

## 2024-08-12 RX ORDER — HYDROCHLOROTHIAZIDE 12.5 MG/1
12.5 TABLET ORAL DAILY
Qty: 30 TABLET | Refills: 3 | Status: SHIPPED | OUTPATIENT
Start: 2024-08-12

## 2024-09-24 DIAGNOSIS — F33.0 MILD EPISODE OF RECURRENT DEPRESSIVE DISORDER: Chronic | ICD-10-CM

## 2024-09-24 RX ORDER — CITALOPRAM HYDROBROMIDE 20 MG/1
20 TABLET ORAL DAILY
Qty: 30 TABLET | Refills: 2 | Status: SHIPPED | OUTPATIENT
Start: 2024-09-24

## 2024-10-06 DIAGNOSIS — E55.9 VITAMIN D DEFICIENCY: ICD-10-CM

## 2024-10-07 RX ORDER — ERGOCALCIFEROL 1.25 MG/1
50000 CAPSULE, LIQUID FILLED ORAL WEEKLY
Qty: 5 CAPSULE | Refills: 2 | Status: SHIPPED | OUTPATIENT
Start: 2024-10-07

## 2024-10-08 ENCOUNTER — OFFICE VISIT (OUTPATIENT)
Dept: ENDOCRINOLOGY | Facility: CLINIC | Age: 44
End: 2024-10-08
Payer: COMMERCIAL

## 2024-10-08 VITALS
DIASTOLIC BLOOD PRESSURE: 68 MMHG | SYSTOLIC BLOOD PRESSURE: 118 MMHG | HEART RATE: 88 BPM | WEIGHT: 235.4 LBS | HEIGHT: 66 IN | OXYGEN SATURATION: 98 % | BODY MASS INDEX: 37.83 KG/M2

## 2024-10-08 DIAGNOSIS — E89.0 POSTOPERATIVE HYPOTHYROIDISM: Chronic | ICD-10-CM

## 2024-10-08 DIAGNOSIS — E66.813 CLASS 3 SEVERE OBESITY WITH SERIOUS COMORBIDITY AND BODY MASS INDEX (BMI) OF 40.0 TO 44.9 IN ADULT, UNSPECIFIED OBESITY TYPE: ICD-10-CM

## 2024-10-08 DIAGNOSIS — C73 FOLLICULAR THYROID CANCER: Primary | ICD-10-CM

## 2024-10-08 DIAGNOSIS — E66.01 CLASS 3 SEVERE OBESITY WITH SERIOUS COMORBIDITY AND BODY MASS INDEX (BMI) OF 40.0 TO 44.9 IN ADULT, UNSPECIFIED OBESITY TYPE: ICD-10-CM

## 2024-10-08 PROCEDURE — 80053 COMPREHEN METABOLIC PANEL: CPT | Performed by: INTERNAL MEDICINE

## 2024-10-08 PROCEDURE — 99214 OFFICE O/P EST MOD 30 MIN: CPT | Performed by: INTERNAL MEDICINE

## 2024-10-08 PROCEDURE — 84443 ASSAY THYROID STIM HORMONE: CPT | Performed by: INTERNAL MEDICINE

## 2024-10-08 PROCEDURE — 84439 ASSAY OF FREE THYROXINE: CPT | Performed by: INTERNAL MEDICINE

## 2024-10-08 RX ORDER — ACETAMINOPHEN AND CODEINE PHOSPHATE 120; 12 MG/5ML; MG/5ML
1 SOLUTION ORAL DAILY
COMMUNITY

## 2024-10-08 NOTE — PROGRESS NOTES
Chief Complaint   Patient presents with    Thyroid Cancer    Hypothyroidism    Obesity     Follow-up       HPI:   Laura Weathers is a 44 y.o.female who returns to Endocrine Clinic for f/u evaluation of her hypothyroidism and follicular thyroid cancer, and obesity. Last visit 04/08/2024. Her history is as follows:    Interim Events:   - Switched from Wegovy 2.4 mg to Zepbound 12.5 mg in 06/2024. Dose increased from 12.5 mg to 15 mg on 08/01/2024. Pt states she is tolerating the Zepbound well. She is exercising regularly and making healthy dietary changes  - Pt is tolerating her levothyroxine dose. Denies palpitations.     1) T3N0M0 follicular thyroid carcinoma:  - found to have a palpable left lobe nodule/goiter on physical exam during pregnancy in 2013. Post partum she had FNA of a 1.5 cm left lobe nodule by Dr. Phoenix Hanna. Cytology showed follicular neoplasm and excision was recommended.  - pt underwent a two stage thyroidectomy in 06/26/2013 and 07/01/2013    - (06/26/2013) s/p left thyroid lobectomy and isthmusectomy at [facility]   Surgical Pathology: T3N0Mx Follicular carcinoma  - Left lobe tumor, 1.8 cm in greatest dimension.  * Tumor capsule present  *Tumor capsule invasion present  *Lymph-vascular invasion present  *Focal extrathyroidal extension identified.  *Focal angioinvasion by neoplasm identified.  *Margins free of neoplasm.  * (0/4) Four peripheral lymph nodes negative for metastatic carcinoma.     - (07/01/2013) s/p completion right lobectomy.  Surgical Pathology: variably sized colloid filled follicles without atypia or tumor involvement. No parathyroid glands or lymph nodes are seen .     Postoperative Course:  - (07/24/2013): Received 105.3 mCi of I-131 at [facility] for remnant ablation ordered by Dr. Phoenix Hanna. Pt was off thyroid hormone and prepped with 1-2 weeks of a low iodine diet. TSH was 75, TG was not collected prior to treatment  - (08/13/2014) Thyrogen Stimulated I-131 [facility]BS  ([facility]) - negative scan, stimulated TG was 1.9  - (12/09/2015)  In office neck US by Dr. Hanna was negative  - (08/15/2016) Thyrogen Stimulated I-131 WBS ([facility]) - negative scan, stimulated TG was 1.7    LAB Summary:  (11/07/2023) TG 0.4, TG Abs <1.0 ([facility], [facility]), TSH 1.680 (0.270 - 4.200), FT4 1.27 (0.93 - 1.70) - on 137 mcg  (11/30/2022) TG 0.4, TG Abs <1.0 ([facility]), TSH 1.220 (0.270 - 4.200), FT4 1.63 (0.93 - 1.70) - on 150 mcg  (11/23/2020) TG 0.3, TG Abs <1.0 ([facility]), TSH 0.034 (0.270 - 4.200), FT4 1.71 (0.93 - 1.70) - on 175 mcg  (05/29/2019) TG 0.3, TG Abs <1.0 ([facility]), TSH 0.478 (0.270 - 4.200), FT4 1.53 (0.93 - 1.70)  (11/28/2019) TG 0.3, TG Abs <1.0 ([facility]), TSH 0.038 (0.350 - 5.350), FT4 1.49 (0.89 - 1.76)  (08/17/2016) stimulated TG 1.7  (12/08/2015) TG 0.4, TSH 0.13  (05/09/2015) TG 0.3, TSH 0.02  (08/13/2014) stimulated TG 1.9    Imaging:   (05/2018) Neck US in clinic - negative     2) postoperative hypothyroidism:  - was on suppressive thyroid hormone therapy with levothyroxine 175 mcg daily but pt could not tolerate. Alternating doses of 175 and 150 which is approx 164 mcg daily kept her TSH at 0.477,  Doses have fluctuated related to GI issues from food allergies    (08/31/2023) TSH 0.10 (0.36 - 3.74), free T4 1.79 (0.76 - 1.46). Decreased Synthroid form 150 to 137 due to concerns of symptoms at that time     Current Dose: Brand Synthroid 137 mcg qAM  Tolerating this regimen  Takes in AM on an empty stomach.  No food or hot liquids for at least 30 minutes  Not on a high-dose biotin supplement  No missed doses    3) Obesity, Class 3:  - (11/30/2022) Wt 286 lbs, BMI 46.2 - Started Wegovy in 12/2022.     - Switched from Wegovy 2.4 mg to Zepbound 12.5 mg in 06/2024. Weight loss had plateaued on Wegovy 2.4 mg despite dietary changes and regular exercise.  Dose increased from 12.5 mg to 15 mg on 08/01/2024.   - Pt states she is tolerating the Zepbound well. She is exercising regularly  "and making healthy dietary changes    Current Dose: Zepbound 15 mg weekly.   Tolerating. Occasional constipation. Takes miralax PRN. Denies N/V/D or increased acid reflux     Current Weight: 235 lbs, BMI 38.01 - Has lost 51 lbs todate    Other History:   - Patient followed by allergy immunology. Has multiple food allergies that were causing GI symptoms. Allergy injections and dietary changes have helped symptoms.   - Had sinus polyps removed in 03/2021  - s/p MI in 05/2023. S/P 3 stents in LAD at [facility]    Review of Systems   Constitutional:  Negative for fatigue.        Wt loss   HENT: Negative.     Eyes: Negative.  Negative for itching.   Respiratory: Negative.     Cardiovascular: Negative.  Negative for palpitations.   Gastrointestinal:  Negative for constipation, diarrhea, nausea and vomiting.   Endocrine: Negative.    Genitourinary: Negative.    Musculoskeletal: Negative.    Skin: Negative.    Allergic/Immunologic: Positive for environmental allergies and food allergies.   Neurological: Negative.    Hematological: Negative.    Psychiatric/Behavioral: Negative.       The following portions of the patient's history were reviewed and updated as appropriate: allergies, current medications, past family history, past medical history, past social history, past surgical history and problem list.    /68   Pulse 88   Ht 167.6 cm (65.98\")   Wt 107 kg (235 lb 6.4 oz)   SpO2 98%   BMI 38.01 kg/m²   Physical Exam   Constitutional: She is oriented to person, place, and time. She appears well-developed. No distress.   HENT:   Head: Normocephalic.   Eyes: Pupils are equal, round, and reactive to light. Conjunctivae are normal.   Neck: No tracheal deviation present.   No palpable thyroid tissue     Cardiovascular: Normal rate, regular rhythm and normal heart sounds.   No murmur heard.  Pulmonary/Chest: Effort normal and breath sounds normal. No respiratory distress.   Abdominal: Normal appearance. "   Lymphadenopathy:     She has no cervical adenopathy.   Neurological: She is alert and oriented to person, place, and time. No cranial nerve deficit.   Skin: Skin is warm and dry. She is not diaphoretic. No erythema.   Psychiatric: Her behavior is normal.   Vitals reviewed.      LABS/IMAGING: outside records reviewed and summarized in [facility]  Office Visit on 10/08/2024   Component Date Value Ref Range Status    TSH 10/08/2024 0.221 (L)  0.270 - 4.200 uIU/mL Final    Glucose 10/08/2024 70  65 - 99 mg/dL Final    BUN 10/08/2024 12  6 - 20 mg/dL Final    Creatinine 10/08/2024 0.89  0.57 - 1.00 mg/dL Final    Sodium 10/08/2024 140  136 - 145 mmol/L Final    Potassium 10/08/2024 4.5  3.5 - 5.2 mmol/L Final    Chloride 10/08/2024 104  98 - 107 mmol/L Final    CO2 10/08/2024 29.0  22.0 - 29.0 mmol/L Final    Calcium 10/08/2024 9.5  8.6 - 10.5 mg/dL Final    Total Protein 10/08/2024 6.4  6.0 - 8.5 g/dL Final    Albumin 10/08/2024 4.1  3.5 - 5.2 g/dL Final    ALT (SGPT) 10/08/2024 23  1 - 33 U/L Final    AST (SGOT) 10/08/2024 15  1 - 32 U/L Final    Alkaline Phosphatase 10/08/2024 92  39 - 117 U/L Final    Total Bilirubin 10/08/2024 0.7  0.0 - 1.2 mg/dL Final    Globulin 10/08/2024 2.3  gm/dL Final    A/G Ratio 10/08/2024 1.8  g/dL Final    BUN/Creatinine Ratio 10/08/2024 13.5  7.0 - 25.0 Final    Anion Gap 10/08/2024 7.0  5.0 - 15.0 mmol/L Final    eGFR 10/08/2024 81.1  >60.0 mL/min/1.73 Final    Free T4 10/08/2024 1.84 (H)  0.92 - 1.68 ng/dL Final       ASSESSMENT/PLAN:  1) T3N0M0 follicular carcinoma: no evidence of disease recurrence at this time    - diagnosed 06/23/2013, s/p two stage thyroidectomy, and 105.3 mCi of I-131 for remnant ablation by her former endocrinologist   - have discussed the current American Thyroid Association current guidelines for differentiated thyroid cancer management.  - using the REUBEN risk stratification system to estimate risk of persistent/recurrent disease, MsFrancisco Javier Jasiel is in the intermediate  risk category for persistent/recurrent disease    - discussed with patient that it is reassuring that her TG has remained stable at 0.3 - 0.5 for many years.    The overall plan will be to monitor for recurrent disease with TG levels every 12 months. Neck US to be performed as indicated. Her goal TSH will be at the lower end of the normal range or slightly below the normal range.     (04/08/2024) TG 0.3, TG Abs <1.0 ([facility]), TSH 0.878 (0.270 - 4.200), FT4 1.59 (0.93 - 1.70) - on 137 mcg    2) postoperative hypothyroidism:  - was on suppressive thyroid hormone therapy with levothyroxine 175 mcg daily but pt could not tolerate.  - TSH 0.221, free T4 1.84 - pt without palpitations currently. Will continue same dose.   - Will continue Brand Synthroid 137 mcg qAM    3) Obesity, Class 3  Has lost 51 lbs, current BMI 38.01  - continue Zepbound 15 mg weekly.  Reviewed potential side effects. Pt to notify me if medication not tolerated  - encouraged continued incorporation of dietary changes and regular exercise    RTC 5 months    Electronically Signed: Valeria Lara MD

## 2024-10-09 LAB
ALBUMIN SERPL-MCNC: 4.1 G/DL (ref 3.5–5.2)
ALBUMIN/GLOB SERPL: 1.8 G/DL
ALP SERPL-CCNC: 92 U/L (ref 39–117)
ALT SERPL W P-5'-P-CCNC: 23 U/L (ref 1–33)
ANION GAP SERPL CALCULATED.3IONS-SCNC: 7 MMOL/L (ref 5–15)
AST SERPL-CCNC: 15 U/L (ref 1–32)
BILIRUB SERPL-MCNC: 0.7 MG/DL (ref 0–1.2)
BUN SERPL-MCNC: 12 MG/DL (ref 6–20)
BUN/CREAT SERPL: 13.5 (ref 7–25)
CALCIUM SPEC-SCNC: 9.5 MG/DL (ref 8.6–10.5)
CHLORIDE SERPL-SCNC: 104 MMOL/L (ref 98–107)
CO2 SERPL-SCNC: 29 MMOL/L (ref 22–29)
CREAT SERPL-MCNC: 0.89 MG/DL (ref 0.57–1)
EGFRCR SERPLBLD CKD-EPI 2021: 81.1 ML/MIN/1.73
GLOBULIN UR ELPH-MCNC: 2.3 GM/DL
GLUCOSE SERPL-MCNC: 70 MG/DL (ref 65–99)
POTASSIUM SERPL-SCNC: 4.5 MMOL/L (ref 3.5–5.2)
PROT SERPL-MCNC: 6.4 G/DL (ref 6–8.5)
SODIUM SERPL-SCNC: 140 MMOL/L (ref 136–145)
T4 FREE SERPL-MCNC: 1.84 NG/DL (ref 0.92–1.68)
TSH SERPL DL<=0.05 MIU/L-ACNC: 0.22 UIU/ML (ref 0.27–4.2)

## 2024-10-09 NOTE — PROGRESS NOTES
Spoke to patient about lab results. See clinic note from 10/08/2024 for details.   Electronically Signed: Valeria Lara MD

## 2024-12-30 DIAGNOSIS — F33.0 MILD EPISODE OF RECURRENT DEPRESSIVE DISORDER: Chronic | ICD-10-CM

## 2024-12-30 RX ORDER — CITALOPRAM HYDROBROMIDE 20 MG/1
20 TABLET ORAL DAILY
Qty: 30 TABLET | Refills: 0 | Status: SHIPPED | OUTPATIENT
Start: 2024-12-30

## 2025-01-02 DIAGNOSIS — E55.9 VITAMIN D DEFICIENCY: ICD-10-CM

## 2025-01-02 RX ORDER — ERGOCALCIFEROL 1.25 MG/1
50000 CAPSULE, LIQUID FILLED ORAL WEEKLY
Qty: 5 CAPSULE | Refills: 2 | Status: SHIPPED | OUTPATIENT
Start: 2025-01-02

## 2025-01-03 DIAGNOSIS — I10 ESSENTIAL HYPERTENSION: ICD-10-CM

## 2025-01-03 RX ORDER — HYDROCHLOROTHIAZIDE 12.5 MG/1
12.5 TABLET ORAL DAILY
Qty: 30 TABLET | Refills: 0 | Status: SHIPPED | OUTPATIENT
Start: 2025-01-03

## 2025-01-10 ENCOUNTER — PRIOR AUTHORIZATION (OUTPATIENT)
Dept: ENDOCRINOLOGY | Facility: CLINIC | Age: 45
End: 2025-01-10
Payer: COMMERCIAL

## 2025-01-10 NOTE — TELEPHONE ENCOUNTER
LINDA NUÑEZ (Key: [id number])  PA Case ID #: [id number]  Rx #: [id number]  Need Help? Call us at [phone]  Outcome  Appro[facility] today by [facility]  Your PA request has been approved. Additional information will be provided in the approval communication. (Message 1149)  Authorization Expiration Date: 1/9/2026  Drug  Synthroid 137MCG tablets  ePA cloud logo  Form  Sofiya Electronic PA Form (2017 NCP[employer])  Original Claim Info  MR,70

## 2025-01-24 DIAGNOSIS — E66.01 CLASS 3 SEVERE OBESITY WITH SERIOUS COMORBIDITY AND BODY MASS INDEX (BMI) OF 40.0 TO 44.9 IN ADULT, UNSPECIFIED OBESITY TYPE: ICD-10-CM

## 2025-01-24 DIAGNOSIS — E66.813 CLASS 3 SEVERE OBESITY WITH SERIOUS COMORBIDITY AND BODY MASS INDEX (BMI) OF 40.0 TO 44.9 IN ADULT, UNSPECIFIED OBESITY TYPE: ICD-10-CM

## 2025-01-24 RX ORDER — TIRZEPATIDE 15 MG/.5ML
INJECTION, SOLUTION SUBCUTANEOUS
Qty: 2 ML | Refills: 5 | Status: SHIPPED | OUTPATIENT
Start: 2025-01-24

## 2025-01-24 NOTE — TELEPHONE ENCOUNTER
Rx Refill Note  Requested Prescriptions     Pending Prescriptions Disp Refills    Tirzepatide-Weight Management (Zepbound) 15 MG/0.5ML solution auto-injector [Pharmacy Med Name: Zepbound 15 mg/0.5 mL subcutaneous pen injector] 2 mL 1     Sig: INJECT contents of 1 syringe (15-mg) SUBCUTANEOUSLY ONCE WEEKLY AS DIRECTED      Last office visit with prescribing clinician: 10/8/2024     Next office visit with prescribing clinician: 3/10/2025   {Epi Mcdowell MA  01/24/25, 10:16 EST

## 2025-02-04 DIAGNOSIS — I10 ESSENTIAL HYPERTENSION: ICD-10-CM

## 2025-02-04 RX ORDER — HYDROCHLOROTHIAZIDE 12.5 MG/1
12.5 TABLET ORAL DAILY
Qty: 30 TABLET | Refills: 0 | Status: SHIPPED | OUTPATIENT
Start: 2025-02-04

## 2025-02-04 NOTE — TELEPHONE ENCOUNTER
Rx Refill Note  Requested Prescriptions     Pending Prescriptions Disp Refills    hydroCHLOROthiazide 12.5 MG tablet 30 tablet 0     Sig: Take 1 tablet by mouth Daily.      Last office visit with prescribing clinician: Visit date not found   Last telemedicine visit with prescribing clinician: Visit date not found   Next office visit with prescribing clinician: Visit date not found                         Would you like a call back once the refill request has been completed: [] Yes [] No    If the office needs to give you a call back, can they leave a voicemail: [] Yes [] No    Kirstie Quiroga LPN  02/04/25, 15:06 EST

## 2025-02-12 ENCOUNTER — TELEPHONE (OUTPATIENT)
Dept: ENDOCRINOLOGY | Facility: CLINIC | Age: 45
End: 2025-02-12
Payer: COMMERCIAL

## 2025-02-12 NOTE — TELEPHONE ENCOUNTER
Called the pt and told her I would need the denial from the phar. She said this not go into effect until 3-31-25.    She verbalized understanding.

## 2025-02-12 NOTE — TELEPHONE ENCOUNTER
Caller: Laura Weathers    Relationship to patient: Self      Best call back number: [phone]    Provider: JONAS    Medication PA needed: Tirzepatide-Weight Management (Zepbound) 15 MG/0.5ML solution auto-injector     Reason for call/Prior Auth:  INSURANCE STATED PA IS NEEDED

## 2025-02-14 ENCOUNTER — OFFICE VISIT (OUTPATIENT)
Dept: FAMILY MEDICINE CLINIC | Facility: CLINIC | Age: 45
End: 2025-02-14
Payer: COMMERCIAL

## 2025-02-14 VITALS
TEMPERATURE: 98.7 F | HEART RATE: 86 BPM | OXYGEN SATURATION: 100 % | SYSTOLIC BLOOD PRESSURE: 124 MMHG | RESPIRATION RATE: 20 BRPM | BODY MASS INDEX: 36.8 KG/M2 | DIASTOLIC BLOOD PRESSURE: 88 MMHG | HEIGHT: 66 IN | WEIGHT: 229 LBS

## 2025-02-14 DIAGNOSIS — F32.A DEPRESSION, UNSPECIFIED DEPRESSION TYPE: ICD-10-CM

## 2025-02-14 DIAGNOSIS — F33.0 MILD EPISODE OF RECURRENT DEPRESSIVE DISORDER: ICD-10-CM

## 2025-02-14 DIAGNOSIS — N89.8 VAGINAL DISCHARGE: ICD-10-CM

## 2025-02-14 DIAGNOSIS — F51.01 PRIMARY INSOMNIA: ICD-10-CM

## 2025-02-14 DIAGNOSIS — F41.9 ANXIETY: ICD-10-CM

## 2025-02-14 DIAGNOSIS — I10 BENIGN ESSENTIAL HYPERTENSION: Primary | ICD-10-CM

## 2025-02-14 DIAGNOSIS — E78.00 PURE HYPERCHOLESTEROLEMIA: Chronic | ICD-10-CM

## 2025-02-14 PROCEDURE — 87798 DETECT AGENT NOS DNA AMP: CPT | Performed by: STUDENT IN AN ORGANIZED HEALTH CARE EDUCATION/TRAINING PROGRAM

## 2025-02-14 PROCEDURE — 87491 CHLMYD TRACH DNA AMP PROBE: CPT | Performed by: STUDENT IN AN ORGANIZED HEALTH CARE EDUCATION/TRAINING PROGRAM

## 2025-02-14 PROCEDURE — 87591 N.GONORRHOEAE DNA AMP PROB: CPT | Performed by: STUDENT IN AN ORGANIZED HEALTH CARE EDUCATION/TRAINING PROGRAM

## 2025-02-14 PROCEDURE — 99214 OFFICE O/P EST MOD 30 MIN: CPT | Performed by: STUDENT IN AN ORGANIZED HEALTH CARE EDUCATION/TRAINING PROGRAM

## 2025-02-14 PROCEDURE — 87801 DETECT AGNT MULT DNA AMPLI: CPT | Performed by: STUDENT IN AN ORGANIZED HEALTH CARE EDUCATION/TRAINING PROGRAM

## 2025-02-14 PROCEDURE — 87661 TRICHOMONAS VAGINALIS AMPLIF: CPT | Performed by: STUDENT IN AN ORGANIZED HEALTH CARE EDUCATION/TRAINING PROGRAM

## 2025-02-14 RX ORDER — PROPRANOLOL HYDROCHLORIDE 60 MG/1
60 CAPSULE, EXTENDED RELEASE ORAL DAILY
Qty: 30 CAPSULE | Refills: 1 | Status: SHIPPED | OUTPATIENT
Start: 2025-02-14

## 2025-02-14 RX ORDER — PNV NO.95/FERROUS FUM/FOLIC AC 28MG-0.8MG
TABLET ORAL
COMMUNITY

## 2025-02-14 RX ORDER — TRAZODONE HYDROCHLORIDE 100 MG/1
TABLET ORAL
Qty: 90 TABLET | Refills: 0 | Status: SHIPPED | OUTPATIENT
Start: 2025-02-14

## 2025-02-14 RX ORDER — NYSTATIN 100000 [USP'U]/G
POWDER TOPICAL 4 TIMES DAILY
Qty: 30 G | Refills: 0 | Status: SHIPPED | OUTPATIENT
Start: 2025-02-14

## 2025-02-14 NOTE — PROGRESS NOTES
New Patient Office Visit      Patient Name: Laura Weathers  : 1980   MRN: [id number]     Chief Complaint:  Establish Care     History of Present Illness:     Htn  Followed by dr howard and still taking losartan with hctz.       Hld  She is taking her statin. These medications are prescribed by cardiology.     Insomnia and anxiety  She has a sick friend and this has been causing her a lot of anxiety. She does have fast heart rate and feels a beta blocker would also help with anxiety. She is not suicidal homicidal. She feels celexa is a great option for her. She also reports seasonal depression.   She says the trazodone does not always help her fall asleep. If she takes it too late she cannot get up in the morning. She takes 50 mg nightly.     After showering she will have some foul smell vaginally. No itching.     No other complaints today.       Subjective        Past Medical History:   Diagnosis Date    Abnormal Pap smear of cervix     Allergic     Depression     Follicular thyroid cancer 2017    T3N0M0 follicular thyroid cancer s/p two stage thyroidectomy and I -131 remnant ablation    Gestational hypertension     Headache     Sinus    Heart attack     HL (hearing loss) 2024    Hypertension     Migraine     PMS (premenstrual syndrome)     Postoperative hypothyroidism 2013    Preeclampsia     delivered, with a postpartum complication    Vitamin D deficiency        Past Surgical History:   Procedure Laterality Date    CARDIAC CATHETERIZATION      CARDIAC SURGERY      stints    CERVICAL BIOPSY  W/ LOOP ELECTRODE EXCISION       SECTION      SINUS SURGERY  10/2019    another in 3/2021    SINUS SURGERY Left 2021    THYROIDECTOMY      two stage thyroidectomy       Family History   Problem Relation Age of Onset    Arrhythmia Mother     Hypertension Mother     Heart disease Mother     Arthritis Mother     Stroke Mother     COPD Mother     Celiac disease Father      Other (parkinsons) Father     Heart attack Sister 28        SCD    Heart disease Sister         Heart attack   (age 28)    Heart disease Brother         Heart attack in  (age 52)    Hypertension Brother     Other (parkinson) Paternal Grandmother     Breast cancer Neg Hx     Ovarian cancer Neg Hx        Social History     Socioeconomic History    Marital status:    Tobacco Use    Smoking status: Former     Current packs/day: 0.00     Average packs/day: 0.5 packs/day for 4.0 years (2.0 ttl pk-yrs)     Types: Cigarettes     Start date: 1998     Quit date: 2002     Years since quittin.1     Passive exposure: Never    Smokeless tobacco: Never    Tobacco comments:     Quit 20+ yrs ago   Vaping Use    Vaping status: Never Used   Substance and Sexual Activity    Alcohol use: No    Drug use: No    Sexual activity: Yes     Partners: Male     Birth control/protection: Condom, Pill          Current Outpatient Medications:     albuterol sulfate  (90 Base) MCG/ACT inhaler, Inhale 2 puffs Every 6 (Six) Hours As Needed for Wheezing., Disp: , Rfl:     aspirin 81 MG EC tablet, 1 tablet Daily., Disp: , Rfl:     Azelastine HCl 137 MCG/SPRAY solution, INSTILL 1 SPRAY IN EACH NOSTRIL EVERY DAY, Disp: 30 mL, Rfl: 0    citalopram (CeleXA) 20 MG tablet, Take 1 tablet by mouth Daily., Disp: 30 tablet, Rfl: 0    EPINEPHrine (EPIPEN) 0.3 MG/0.3ML solution auto-injector injection, 0.3 mL As Needed., Disp: , Rfl:     fexofenadine (ALLEGRA) 180 MG tablet, Take 1 tablet by mouth Daily., Disp: , Rfl:     fluticasone (FLONASE) 50 MCG/ACT nasal spray, 2 sprays into the nostril(s) as directed by provider Daily., Disp: 16 g, Rfl: 2    hydroCHLOROthiazide 12.5 MG tablet, Take 1 tablet by mouth Daily., Disp: 30 tablet, Rfl: 0    Lipitor 80 MG tablet, Take 1 tablet by mouth Daily., Disp: , Rfl:     losartan (COZAAR) 50 MG tablet, Take 1 tablet by mouth Every 12 (Twelve) Hours., Disp: , Rfl:     norethindrone  "(Coral) 0.35 MG tablet, Take 1 tablet by mouth Daily., Disp: , Rfl:     potassium chloride (KLOR-CON) 20 MEQ packet, Take 20 mEq by mouth Daily., Disp: , Rfl:     Singulair 10 MG tablet, Take 1 tablet by mouth Every Night., Disp: , Rfl:     Synthroid 137 MCG tablet, Take 1 tablet by mouth Every Morning., Disp: 90 tablet, Rfl: 3    Tirzepatide-Weight Management (Zepbound) 15 MG/0.5ML solution auto-injector, INJECT contents of 1 syringe (15-mg) SUBCUTANEOUSLY ONCE WEEKLY AS DIRECTED, Disp: 2 mL, Rfl: 5    traZODone (DESYREL) 100 MG tablet, 1 tab qhs prn insomnia, Disp: 90 tablet, Rfl: 0    vitamin D (ERGOCALCIFEROL) 1.25 MG (37716 UT) capsule capsule, TAKE ONE CAPSULE BY MOUTH ONCE WEEKLY, Disp: 5 capsule, Rfl: 2    Prenatal Vit-Fe Fumarate-FA (Prenatal Vitamin) 27-0.8 MG tablet, , Disp: , Rfl:     propranolol LA (Inderal LA) 60 MG 24 hr capsule, Take 1 capsule by mouth Daily., Disp: 30 capsule, Rfl: 1    Allergies   Allergen Reactions    Prednisone Hives    Coreg [Carvedilol] Other (See Comments)     Liver side effect    Adhesive Tape Rash       Objective     Physical Exam:  Vitals:    02/14/25 1539   BP: 124/88   Pulse: 86   Resp: 20   Temp: 98.7 °F (37.1 °C)   TempSrc: Temporal   SpO2: 100%   Weight: 104 kg (229 lb)   Height: 167.6 cm (65.98\")      Body mass index is 36.98 kg/m².     Physical Exam  Constitutional:       General: She is not in acute distress.     Appearance: Normal appearance.   HENT:      Head: Normocephalic and atraumatic.   Eyes:      Extraocular Movements: Extraocular movements intact.   Cardiovascular:      Rate and Rhythm: Normal rate and regular rhythm.      Heart sounds: No murmur heard.  Pulmonary:      Effort: Pulmonary effort is normal. No respiratory distress.      Breath sounds: Normal breath sounds. No stridor. No wheezing, rhonchi or rales.   Skin:     Findings: No rash.   Neurological:      General: No focal deficit present.      Mental Status: She is alert.   Psychiatric:         " Mood and Affect: Mood normal.            Assessment / Plan      Assessment/Plan:   Diagnoses and all orders for this visit:    1. Benign essential hypertension (Primary)    2. Pure hypercholesterolemia  -     Comprehensive metabolic panel; Future    3. Mild episode of recurrent depressive disorder  -     traZODone (DESYREL) 100 MG tablet; 1 tab qhs prn insomnia  Dispense: 90 tablet; Refill: 0    4. Primary insomnia  -     traZODone (DESYREL) 100 MG tablet; 1 tab qhs prn insomnia  Dispense: 90 tablet; Refill: 0    5. Anxiety  -     [facility] - Swab,; Future    6. Depression, unspecified depression type  -     [facility] - Swab,; Future    Other orders  -     propranolol LA (Inderal LA) 60 MG 24 hr capsule; Take 1 capsule by mouth Daily.  Dispense: 30 capsule; Refill: 1         Continue current medications. Further blood work in the Summer.     Counseled on risk of liver injury check lfts in two weeks. Watch for low bp dizziness fatigue.   Change trazodone to 100 mg nightly.       Check vaginal swab  Start vaginal probiotics    Intertrigo armpits  Nystatin        Return in about 1 month (around 3/14/2025) for beta blocker check.       Tosin Santa D.O.  [facility]

## 2025-02-17 LAB
A VAGINAE DNA VAG QL NAA+PROBE: ABNORMAL SCORE
BVAB2 DNA VAG QL NAA+PROBE: ABNORMAL SCORE
C ALBICANS DNA VAG QL NAA+PROBE: NEGATIVE
C GLABRATA DNA VAG QL NAA+PROBE: NEGATIVE
C TRACH DNA SPEC QL NAA+PROBE: NEGATIVE
MEGA1 DNA VAG QL NAA+PROBE: ABNORMAL SCORE
N GONORRHOEA DNA VAG QL NAA+PROBE: NEGATIVE
T VAGINALIS DNA VAG QL NAA+PROBE: NEGATIVE

## 2025-02-18 RX ORDER — METRONIDAZOLE 500 MG/1
500 TABLET ORAL 2 TIMES DAILY
Qty: 14 TABLET | Refills: 0 | Status: SHIPPED | OUTPATIENT
Start: 2025-02-18 | End: 2025-02-25

## 2025-02-19 ENCOUNTER — TELEPHONE (OUTPATIENT)
Dept: FAMILY MEDICINE CLINIC | Facility: CLINIC | Age: 45
End: 2025-02-19
Payer: COMMERCIAL

## 2025-02-19 NOTE — TELEPHONE ENCOUNTER
Called patient and she voiced understanding.      ----- Message from Tosin Santa sent at 2/18/2025  8:51 AM EST -----  Please let the patient know her testing is positive for BV I have called over treatment.

## 2025-03-04 ENCOUNTER — LAB (OUTPATIENT)
Dept: LAB | Facility: HOSPITAL | Age: 45
End: 2025-03-04
Payer: COMMERCIAL

## 2025-03-04 DIAGNOSIS — E78.00 PURE HYPERCHOLESTEROLEMIA: Chronic | ICD-10-CM

## 2025-03-04 LAB
ALBUMIN SERPL-MCNC: 4.3 G/DL (ref 3.5–5.2)
ALBUMIN/GLOB SERPL: 1.7 G/DL
ALP SERPL-CCNC: 97 U/L (ref 39–117)
ALT SERPL W P-5'-P-CCNC: 32 U/L (ref 1–33)
ANION GAP SERPL CALCULATED.3IONS-SCNC: 9.8 MMOL/L (ref 5–15)
AST SERPL-CCNC: 19 U/L (ref 1–32)
BILIRUB SERPL-MCNC: 0.8 MG/DL (ref 0–1.2)
BUN SERPL-MCNC: 11 MG/DL (ref 6–20)
BUN/CREAT SERPL: 12 (ref 7–25)
CALCIUM SPEC-SCNC: 9.6 MG/DL (ref 8.6–10.5)
CHLORIDE SERPL-SCNC: 103 MMOL/L (ref 98–107)
CO2 SERPL-SCNC: 28.2 MMOL/L (ref 22–29)
CREAT SERPL-MCNC: 0.92 MG/DL (ref 0.57–1)
EGFRCR SERPLBLD CKD-EPI 2021: 77.4 ML/MIN/1.73
GLOBULIN UR ELPH-MCNC: 2.6 GM/DL
GLUCOSE SERPL-MCNC: 81 MG/DL (ref 65–99)
POTASSIUM SERPL-SCNC: 4.9 MMOL/L (ref 3.5–5.2)
PROT SERPL-MCNC: 6.9 G/DL (ref 6–8.5)
SODIUM SERPL-SCNC: 141 MMOL/L (ref 136–145)

## 2025-03-04 PROCEDURE — 36415 COLL VENOUS BLD VENIPUNCTURE: CPT

## 2025-03-04 PROCEDURE — 80053 COMPREHEN METABOLIC PANEL: CPT

## 2025-03-08 DIAGNOSIS — I10 ESSENTIAL HYPERTENSION: ICD-10-CM

## 2025-03-08 RX ORDER — HYDROCHLOROTHIAZIDE 12.5 MG/1
12.5 TABLET ORAL DAILY
Qty: 30 TABLET | Refills: 0 | Status: SHIPPED | OUTPATIENT
Start: 2025-03-08

## 2025-03-10 ENCOUNTER — OFFICE VISIT (OUTPATIENT)
Dept: ENDOCRINOLOGY | Facility: CLINIC | Age: 45
End: 2025-03-10
Payer: COMMERCIAL

## 2025-03-10 VITALS
DIASTOLIC BLOOD PRESSURE: 82 MMHG | OXYGEN SATURATION: 98 % | BODY MASS INDEX: 37.12 KG/M2 | WEIGHT: 231 LBS | SYSTOLIC BLOOD PRESSURE: 120 MMHG | HEART RATE: 85 BPM | HEIGHT: 66 IN

## 2025-03-10 DIAGNOSIS — C73 FOLLICULAR THYROID CANCER: Primary | ICD-10-CM

## 2025-03-10 DIAGNOSIS — E66.01 CLASS 3 SEVERE OBESITY WITH SERIOUS COMORBIDITY AND BODY MASS INDEX (BMI) OF 40.0 TO 44.9 IN ADULT, UNSPECIFIED OBESITY TYPE: ICD-10-CM

## 2025-03-10 DIAGNOSIS — E66.813 CLASS 3 SEVERE OBESITY WITH SERIOUS COMORBIDITY AND BODY MASS INDEX (BMI) OF 40.0 TO 44.9 IN ADULT, UNSPECIFIED OBESITY TYPE: ICD-10-CM

## 2025-03-10 DIAGNOSIS — F33.0 MILD EPISODE OF RECURRENT DEPRESSIVE DISORDER: Chronic | ICD-10-CM

## 2025-03-10 DIAGNOSIS — E89.0 POSTOPERATIVE HYPOTHYROIDISM: ICD-10-CM

## 2025-03-10 LAB
T4 FREE SERPL-MCNC: 1.75 NG/DL (ref 0.92–1.68)
TSH SERPL DL<=0.05 MIU/L-ACNC: 0.22 UIU/ML (ref 0.27–4.2)

## 2025-03-10 PROCEDURE — 99214 OFFICE O/P EST MOD 30 MIN: CPT | Performed by: INTERNAL MEDICINE

## 2025-03-10 PROCEDURE — 84432 ASSAY OF THYROGLOBULIN: CPT | Performed by: INTERNAL MEDICINE

## 2025-03-10 PROCEDURE — 84443 ASSAY THYROID STIM HORMONE: CPT | Performed by: INTERNAL MEDICINE

## 2025-03-10 PROCEDURE — 84439 ASSAY OF FREE THYROXINE: CPT | Performed by: INTERNAL MEDICINE

## 2025-03-10 PROCEDURE — 86800 THYROGLOBULIN ANTIBODY: CPT | Performed by: INTERNAL MEDICINE

## 2025-03-10 RX ORDER — CITALOPRAM HYDROBROMIDE 20 MG/1
20 TABLET ORAL DAILY
Qty: 30 TABLET | Refills: 0 | Status: SHIPPED | OUTPATIENT
Start: 2025-03-10

## 2025-03-10 NOTE — PROGRESS NOTES
Chief Complaint   Patient presents with    Thyroid Cancer     Follicular thyroid cancer    Hypothyroidism    Obesity     Follow-up       HPI:   Laura Weathers is a 45 y.o.female who returns to Endocrine Clinic for f/u evaluation of her hypothyroidism and follicular thyroid cancer, and obesity. Last visit 10/08/2024. Her history is as follows:    Interim Events:   - Pt reports tolerating the Zepbound 15 mg weekly.  Weight has been stable.  Has not had a chance to increase her physical activity during the winter months  - Pt is tolerating her levothyroxine dose. Denies palpitations.     1) T3N0M0 follicular thyroid carcinoma:  - found to have a palpable left lobe nodule/goiter on physical exam during pregnancy in 2013. Post partum she had FNA of a 1.5 cm left lobe nodule by Dr. Phoenix Hanna. Cytology showed follicular neoplasm and excision was recommended.  - pt underwent a two stage thyroidectomy in 06/26/2013 and 07/01/2013    - (06/26/2013) s/p left thyroid lobectomy and isthmusectomy at [facility]   Surgical Pathology: T3N0Mx Follicular carcinoma  - Left lobe tumor, 1.8 cm in greatest dimension.  * Tumor capsule present  *Tumor capsule invasion present  *Lymph-vascular invasion present  *Focal extrathyroidal extension identified.  *Focal angioinvasion by neoplasm identified.  *Margins free of neoplasm.  * (0/4) Four peripheral lymph nodes negative for metastatic carcinoma.     - (07/01/2013) s/p completion right lobectomy.  Surgical Pathology: variably sized colloid filled follicles without atypia or tumor involvement. No parathyroid glands or lymph nodes are seen .     Postoperative Course:  - (07/24/2013): Received 105.3 mCi of I-131 at [facility] for remnant ablation ordered by Dr. Phoenix Hanna. Pt was off thyroid hormone and prepped with 1-2 weeks of a low iodine diet. TSH was 75, TG was not collected prior to treatment  - (08/13/2014) Thyrogen Stimulated I-131 WBS ([facility]) - negative scan,  stimulated TG was 1.9  - (12/09/2015)  In office neck US by Dr. Hanna was negative  - (08/15/2016) Thyrogen Stimulated I-131 WBS ([facility]) - negative scan, stimulated TG was 1.7    LAB Summary:  (11/07/2023) TG 0.4, TG Abs <1.0 ([facility], [facility]), TSH 1.680 (0.270 - 4.200), FT4 1.27 (0.93 - 1.70) - on 137 mcg  (11/30/2022) TG 0.4, TG Abs <1.0 ([facility]), TSH 1.220 (0.270 - 4.200), FT4 1.63 (0.93 - 1.70) - on 150 mcg  (11/23/2020) TG 0.3, TG Abs <1.0 ([facility]), TSH 0.034 (0.270 - 4.200), FT4 1.71 (0.93 - 1.70) - on 175 mcg  (05/29/2019) TG 0.3, TG Abs <1.0 ([facility]), TSH 0.478 (0.270 - 4.200), FT4 1.53 (0.93 - 1.70)  (11/28/2019) TG 0.3, TG Abs <1.0 ([facility]), TSH 0.038 (0.350 - 5.350), FT4 1.49 (0.89 - 1.76)  (08/17/2016) stimulated TG 1.7  (12/08/2015) TG 0.4, TSH 0.13  (05/09/2015) TG 0.3, TSH 0.02  (08/13/2014) stimulated TG 1.9    Imaging:   (05/2018) Neck US in clinic - negative     2) postoperative hypothyroidism:  - was on suppressive thyroid hormone therapy with levothyroxine 175 mcg daily but pt could not tolerate. Alternating doses of 175 and 150 which is approx 164 mcg daily kept her TSH at 0.477,  Doses have fluctuated related to GI issues from food allergies    (08/31/2023) TSH 0.10 (0.36 - 3.74), free T4 1.79 (0.76 - 1.46). Decreased Synthroid form 150 to 137 due to concerns of symptoms at that time     Current Dose: Brand Synthroid 137 mcg qAM  Tolerating this regimen  Takes in AM on an empty stomach.  No food or hot liquids for at least 30 minutes  Not on a high-dose biotin supplement  No missed doses    3) Obesity, Class 3:  - (11/30/2022) Wt 286 lbs, BMI 46.2 - Started Wegovy in 12/2022.     - Switched from Wegovy 2.4 mg to Zepbound 12.5 mg in 06/2024. Weight loss had plateaued on Wegovy 2.4 mg despite dietary changes and regular exercise.  Dose increased from 12.5 mg to 15 mg on 08/01/2024.   - Pt states she is tolerating the Zepbound well. She is exercising regularly and making healthy dietary  "changes    Current Dose: Zepbound 15 mg weekly.   Tolerating. Occasional constipation. Takes miralax PRN. Denies N/V/D or increased acid reflux     Current Weight: 231 lbs, BMI 37.01 - Has lost 55 lbs todate    Other History:   - Patient followed by allergy immunology. Has multiple food allergies that were causing GI symptoms. Allergy injections and dietary changes have helped symptoms.   - Had sinus polyps removed in 03/2021  - s/p MI in 05/2023. S/P 3 stents in LAD at [facility]    Review of Systems   Constitutional:  Negative for fatigue.        Wt loss, mild   HENT: Negative.     Eyes: Negative.  Negative for itching.   Respiratory: Negative.     Cardiovascular: Negative.  Negative for palpitations.   Gastrointestinal:  Negative for constipation, diarrhea, nausea and vomiting.   Endocrine: Negative.    Genitourinary: Negative.    Musculoskeletal: Negative.    Skin: Negative.    Allergic/Immunologic: Positive for environmental allergies and food allergies.   Neurological: Negative.    Hematological: Negative.    Psychiatric/Behavioral: Negative.       The following portions of the patient's history were reviewed and updated as appropriate: allergies, current medications, past family history, past medical history, past social history, past surgical history and problem list.    /82 (BP Location: Left arm, Patient Position: Sitting, Cuff Size: Adult)   Pulse 85   Ht 167.6 cm (66\")   Wt 105 kg (231 lb)   SpO2 98%   BMI 37.28 kg/m²   Physical Exam   Constitutional: She is oriented to person, place, and time. She appears well-developed. No distress.   HENT:   Head: Normocephalic.   Eyes: Pupils are equal, round, and reactive to light. Conjunctivae are normal.   Neck: No tracheal deviation present.   No palpable thyroid tissue     Cardiovascular: Normal rate, regular rhythm and normal heart sounds.   No murmur heard.  Pulmonary/Chest: Effort normal and breath sounds normal. No respiratory distress. "   Abdominal: Normal appearance.   Lymphadenopathy:     She has no cervical adenopathy.   Neurological: She is alert and oriented to person, place, and time. No cranial nerve deficit.   Skin: Skin is warm and dry. She is not diaphoretic. No erythema.   Psychiatric: Her behavior is normal.   Vitals reviewed.      LABS/IMAGING: outside records reviewed and summarized in [facility]  Office Visit on 03/10/2025   Component Date Value Ref Range Status    TSH 03/10/2025 0.216 (L)  0.270 - 4.200 uIU/mL Final    Free T4 03/10/2025 1.75 (H)  0.92 - 1.68 ng/dL Final    Thyroglobulin Ab 03/10/2025 <1.0  0.0 - 0.9 IU/mL Final    Thyroglobulin Antibody measured by [facility] [place] [facility]  It should be noted that the presence of thyroglobulin antibodies  may not be pathogenic nor diagnostic, especially at very low  levels. The assay  has found that four percent of  individuals without evidence of thyroid disease or autoimmunity  will have positive TgAb levels up to 4 IU/mL.    THYROGLOBULIN BY IRMA 03/10/2025 0.4 (L)  1.5 - 38.5 ng/mL Final    According to the National Academy of Clinical Biochemistry, the  reference interval for Thyroglobulin (TG) should be related to  euthyroid patients and not for patients who underwent thyroidectomy.  TG reference intervals for these patients depend on the residual  mass of the thyroid tissue left after surgery. Establishing a  post-operative baseline is recommended.  The assay limit of quantitation is 0.1 ng/mL  Thyroglobulin measured by [facility] [place] [facility]       ASSESSMENT/PLAN:  1) T3N0M0 follicular carcinoma: no evidence of disease recurrence at this time    - diagnosed 06/23/2013, s/p two stage thyroidectomy, and 105.3 mCi of I-131 for remnant ablation by her former endocrinologist   - have discussed the current American Thyroid Association current guidelines for differentiated thyroid cancer management.  - using the REUBEN risk stratification system to estimate risk of  persistent/recurrent disease, Ms. Weathers is in the intermediate risk category for persistent/recurrent disease    - discussed with patient that it is reassuring that her TG has remained stable at 0.3 - 0.5 for many years.    The overall plan will be to monitor for recurrent disease with TG levels every 12 months. Neck US to be performed as indicated. Her goal TSH will be at the lower end of the normal range or slightly below the normal range.     (03/10/2025) TG 0.4, TG Abs <1.0 ([facility], [facility]), TSH 0.216 (0.270 - 4.200), FT4 1.75 (0.93 - 1.70) - on 137 mcg    2) postoperative hypothyroidism:  - was on suppressive thyroid hormone therapy with levothyroxine 175 mcg daily but pt could not tolerate.  - TSH 0.216, free T4 1.75 - pt without palpitations currently. Will continue same dose.   - Will continue Brand Synthroid 137 mcg qAM    3) Obesity, Class 3  - (11/30/2022) Wt 286 lbs, BMI 46.2 - Started Wegovy in 12/2022.   Current Weight: 231 lbs, BMI 37.01 - Has lost 55 lbs to date  - continue Zepbound 15 mg weekly.  Reviewed potential side effects. Pt to notify me if medication not tolerated  - encouraged continued incorporation of dietary changes and regular exercise    RTC 5 months    Electronically Signed: Valeria Lara MD

## 2025-03-12 LAB
THYROGLOB AB SERPL-ACNC: <1 IU/ML (ref 0–0.9)
THYROGLOB SERPL-MCNC: 0.4 NG/ML (ref 1.5–38.5)

## 2025-03-14 ENCOUNTER — OFFICE VISIT (OUTPATIENT)
Dept: FAMILY MEDICINE CLINIC | Facility: CLINIC | Age: 45
End: 2025-03-14
Payer: COMMERCIAL

## 2025-03-14 VITALS
TEMPERATURE: 98.2 F | BODY MASS INDEX: 37.12 KG/M2 | WEIGHT: 231 LBS | DIASTOLIC BLOOD PRESSURE: 80 MMHG | HEIGHT: 66 IN | SYSTOLIC BLOOD PRESSURE: 120 MMHG | HEART RATE: 75 BPM | OXYGEN SATURATION: 99 % | RESPIRATION RATE: 20 BRPM

## 2025-03-14 DIAGNOSIS — F41.9 ANXIETY: Primary | ICD-10-CM

## 2025-03-14 DIAGNOSIS — G47.00 INSOMNIA, UNSPECIFIED TYPE: ICD-10-CM

## 2025-03-14 PROCEDURE — 99214 OFFICE O/P EST MOD 30 MIN: CPT | Performed by: STUDENT IN AN ORGANIZED HEALTH CARE EDUCATION/TRAINING PROGRAM

## 2025-03-14 NOTE — PROGRESS NOTES
"Chief Complaint  Hypertension, Insomnia (Trazadone did not help would like to discuss alternative), and Anxiety (Fu rx)    History of Present Illness      Anxiety  She feels good on the propranolol and would like to continue this.       Insomnia  Trazodone did not help.     Bv  Symptoms persist.       The following portions of the patient's history were reviewed and updated as appropriate: allergies, current medications, past family history, past medical history, past social history, past surgical history, and problem list.    OBJECTIVE:  /80   Pulse 75   Temp 98.2 °F (36.8 °C) (Temporal)   Resp 20   Ht 167.6 cm (65.98\")   Wt 105 kg (231 lb)   SpO2 99%   BMI 37.30 kg/m²       Physical Exam  Constitutional:       General: She is not in acute distress.     Appearance: Normal appearance.   HENT:      Head: Normocephalic and atraumatic.   Eyes:      Extraocular Movements: Extraocular movements intact.   Cardiovascular:      Rate and Rhythm: Normal rate and regular rhythm.      Heart sounds: No murmur heard.  Pulmonary:      Effort: Pulmonary effort is normal. No respiratory distress.      Breath sounds: Normal breath sounds. No stridor. No wheezing, rhonchi or rales.   Skin:     Findings: No rash.   Neurological:      General: No focal deficit present.      Mental Status: She is alert.   Psychiatric:         Mood and Affect: Mood normal.                    Assessment and Plan   Diagnoses and all orders for this visit:    1. Anxiety (Primary)    2. Insomnia, unspecified type    Other orders  -     Boric Acid Vaginal 600 MG suppository; Insert 1 suppository into the vagina Every Night.  Dispense: 7 suppository; Refill: 0  -     amitriptyline (ELAVIL) 25 MG tablet; Take 1 tablet by mouth Every Night.  Dispense: 30 tablet; Refill: 1        Start elavil  Counseled on risks of urinary retention blurry vision constipation mood changes including suicidal thoughts falls or daytime sleepiness.       Return in about 1 " month (around 4/14/2025) for Annual physical.       Tosin Santa D.O.  [facility] [place] [place]

## 2025-03-31 ENCOUNTER — RESULTS FOLLOW-UP (OUTPATIENT)
Dept: ENDOCRINOLOGY | Facility: CLINIC | Age: 45
End: 2025-03-31
Payer: COMMERCIAL

## 2025-04-02 ENCOUNTER — PRIOR AUTHORIZATION (OUTPATIENT)
Dept: ENDOCRINOLOGY | Facility: CLINIC | Age: 45
End: 2025-04-02
Payer: COMMERCIAL

## 2025-04-02 NOTE — TELEPHONE ENCOUNTER
LINDA NUÑEZ (Key: [id number])  Rx #: [id number]  Zepbound 15MG/0.5ML pen-injectors  Form  [facility] Electronic PA Form (2017 NCPDP)  Created  1 day ago  Sent to Plan  7 hours ago  Plan Response  7 hours ago  Submit Clinical Questions  7 hours ago  Determination  Favorable  6 hours ago  Message from Plan  Your PA request has been approved. Additional information will be provided in the approval communication. (Message 1140). Authorization Expiration Date: April 2, 2026.

## 2025-04-07 DIAGNOSIS — F33.0 MILD EPISODE OF RECURRENT DEPRESSIVE DISORDER: Chronic | ICD-10-CM

## 2025-04-08 RX ORDER — CITALOPRAM HYDROBROMIDE 20 MG/1
20 TABLET ORAL DAILY
Qty: 90 TABLET | Refills: 1 | Status: SHIPPED | OUTPATIENT
Start: 2025-04-08

## 2025-04-10 NOTE — TELEPHONE ENCOUNTER
Call patient with stress test and echo results. Recommend that she start monitoring her blood pressure and to bring readings to next follow-up.  She verbalized understanding with no further questions or concerns   family member

## 2025-04-12 DIAGNOSIS — E55.9 VITAMIN D DEFICIENCY: ICD-10-CM

## 2025-04-14 RX ORDER — ERGOCALCIFEROL 1.25 MG/1
50000 CAPSULE, LIQUID FILLED ORAL WEEKLY
Qty: 5 CAPSULE | Refills: 2 | Status: SHIPPED | OUTPATIENT
Start: 2025-04-14

## 2025-04-14 RX ORDER — PROPRANOLOL HYDROCHLORIDE 60 MG/1
60 CAPSULE, EXTENDED RELEASE ORAL DAILY
Qty: 90 CAPSULE | Refills: 3 | Status: SHIPPED | OUTPATIENT
Start: 2025-04-14

## 2025-04-14 RX ORDER — PROPRANOLOL HYDROCHLORIDE 60 MG/1
60 CAPSULE, EXTENDED RELEASE ORAL DAILY
Qty: 3 CAPSULE | Refills: 0 | OUTPATIENT
Start: 2025-04-14

## 2025-04-18 ENCOUNTER — OFFICE VISIT (OUTPATIENT)
Dept: FAMILY MEDICINE CLINIC | Facility: CLINIC | Age: 45
End: 2025-04-18
Payer: COMMERCIAL

## 2025-04-18 ENCOUNTER — LAB (OUTPATIENT)
Dept: LAB | Facility: HOSPITAL | Age: 45
End: 2025-04-18
Payer: COMMERCIAL

## 2025-04-18 VITALS
DIASTOLIC BLOOD PRESSURE: 105 MMHG | TEMPERATURE: 98.6 F | WEIGHT: 237.4 LBS | HEART RATE: 88 BPM | HEIGHT: 66 IN | OXYGEN SATURATION: 98 % | SYSTOLIC BLOOD PRESSURE: 145 MMHG | BODY MASS INDEX: 38.15 KG/M2 | RESPIRATION RATE: 20 BRPM

## 2025-04-18 DIAGNOSIS — I10 PRIMARY HYPERTENSION: Chronic | ICD-10-CM

## 2025-04-18 DIAGNOSIS — R33.9 URINARY RETENTION: Primary | ICD-10-CM

## 2025-04-18 DIAGNOSIS — R33.9 URINARY RETENTION: ICD-10-CM

## 2025-04-18 DIAGNOSIS — Z12.11 SCREENING FOR COLON CANCER: ICD-10-CM

## 2025-04-18 LAB
BACTERIA UR QL AUTO: ABNORMAL /HPF
BILIRUB UR QL STRIP: NEGATIVE
CLARITY UR: CLEAR
COLOR UR: ABNORMAL
GLUCOSE UR STRIP-MCNC: NEGATIVE MG/DL
HGB UR QL STRIP.AUTO: NEGATIVE
HYALINE CASTS UR QL AUTO: ABNORMAL /LPF
KETONES UR QL STRIP: ABNORMAL
LEUKOCYTE ESTERASE UR QL STRIP.AUTO: ABNORMAL
MUCOUS THREADS URNS QL MICRO: ABNORMAL /HPF
NITRITE UR QL STRIP: NEGATIVE
PH UR STRIP.AUTO: 7 [PH] (ref 5–8)
PROT UR QL STRIP: ABNORMAL
RBC # UR STRIP: ABNORMAL /HPF
REF LAB TEST METHOD: ABNORMAL
SP GR UR STRIP: 1.02 (ref 1–1.03)
SQUAMOUS #/AREA URNS HPF: ABNORMAL /HPF
UROBILINOGEN UR QL STRIP: ABNORMAL
WBC # UR STRIP: ABNORMAL /HPF

## 2025-04-18 PROCEDURE — 80053 COMPREHEN METABOLIC PANEL: CPT

## 2025-04-18 PROCEDURE — 81001 URINALYSIS AUTO W/SCOPE: CPT

## 2025-04-18 PROCEDURE — 36415 COLL VENOUS BLD VENIPUNCTURE: CPT

## 2025-04-18 NOTE — PROGRESS NOTES
"Chief Complaint  Anxiety (Patient also wants to discuss some urinary retention from diltiazem possibly that she no longer takes, said she is very fatigued again)    Anxiety        Insomnia  Elavil caused too much day time sedation so she would like to try thc gummies and she has these ordered.       Htn  She has not taken her losartan hctz today so her bp is elevated.     The patient has been having feelings of incomplete void. She does feel the urine comes out but the sensation of urination is not there. This happened with diltiazem in the past.       She started on vaginal probiotics and her vaginal symptoms resolved.     No other complaints today.         The following portions of the patient's history were reviewed and updated as appropriate: allergies, current medications, past family history, past medical history, past social history, past surgical history, and problem list.    OBJECTIVE:  BP (!) 145/105   Pulse 88   Temp 98.6 °F (37 °C) (Temporal)   Resp 20   Ht 167.6 cm (65.98\")   Wt 108 kg (237 lb 6.4 oz)   SpO2 98%   BMI 38.34 kg/m²       Physical Exam  Constitutional:       General: She is not in acute distress.     Appearance: Normal appearance.   HENT:      Head: Normocephalic and atraumatic.   Eyes:      Extraocular Movements: Extraocular movements intact.   Cardiovascular:      Rate and Rhythm: Normal rate and regular rhythm.      Heart sounds: No murmur heard.  Pulmonary:      Effort: Pulmonary effort is normal. No respiratory distress.      Breath sounds: Normal breath sounds. No stridor. No wheezing, rhonchi or rales.   Skin:     Findings: No rash.   Neurological:      General: No focal deficit present.      Mental Status: She is alert.   Psychiatric:         Mood and Affect: Mood normal.                    Assessment and Plan   Diagnoses and all orders for this visit:    1. Urinary retention (Primary)  -     Urinalysis With Microscopic - Urine, Clean Catch; Future  -     Comprehensive " metabolic panel; Future    2. Primary hypertension    3. Screening for colon cancer  -     Ambulatory Referral For Screening Colonoscopy        Fatigue   Possible  thyroid issue. Continue to follow with endo and check cmp.       She will let me know if she wants to try another med for insomnia.     She will take her bp meds today to lower bp, no cardiac symptoms today.     Check labs.     Return in about 3 months (around 7/18/2025) for Annual physical.       Tosin Santa D.O.  [facility]    no

## 2025-04-19 LAB
ALBUMIN SERPL-MCNC: 3.8 G/DL (ref 3.5–5.2)
ALBUMIN/GLOB SERPL: 1.6 G/DL
ALP SERPL-CCNC: 90 U/L (ref 39–117)
ALT SERPL W P-5'-P-CCNC: 25 U/L (ref 1–33)
ANION GAP SERPL CALCULATED.3IONS-SCNC: 8.4 MMOL/L (ref 5–15)
AST SERPL-CCNC: 17 U/L (ref 1–32)
BILIRUB SERPL-MCNC: 0.5 MG/DL (ref 0–1.2)
BUN SERPL-MCNC: 10 MG/DL (ref 6–20)
BUN/CREAT SERPL: 10.9 (ref 7–25)
CALCIUM SPEC-SCNC: 8.8 MG/DL (ref 8.6–10.5)
CHLORIDE SERPL-SCNC: 106 MMOL/L (ref 98–107)
CO2 SERPL-SCNC: 25.6 MMOL/L (ref 22–29)
CREAT SERPL-MCNC: 0.92 MG/DL (ref 0.57–1)
EGFRCR SERPLBLD CKD-EPI 2021: 77.4 ML/MIN/1.73
GLOBULIN UR ELPH-MCNC: 2.4 GM/DL
GLUCOSE SERPL-MCNC: 72 MG/DL (ref 65–99)
POTASSIUM SERPL-SCNC: 3.9 MMOL/L (ref 3.5–5.2)
PROT SERPL-MCNC: 6.2 G/DL (ref 6–8.5)
SODIUM SERPL-SCNC: 140 MMOL/L (ref 136–145)

## 2025-04-20 DIAGNOSIS — R80.9 PROTEINURIA, UNSPECIFIED TYPE: Primary | ICD-10-CM

## 2025-04-23 ENCOUNTER — LAB (OUTPATIENT)
Dept: LAB | Facility: HOSPITAL | Age: 45
End: 2025-04-23
Payer: COMMERCIAL

## 2025-04-23 DIAGNOSIS — R80.9 PROTEINURIA, UNSPECIFIED TYPE: ICD-10-CM

## 2025-04-23 PROCEDURE — 82570 ASSAY OF URINE CREATININE: CPT

## 2025-04-23 PROCEDURE — 82043 UR ALBUMIN QUANTITATIVE: CPT

## 2025-04-24 LAB
ALBUMIN UR-MCNC: <1.2 MG/DL
CREAT UR-MCNC: 46.9 MG/DL
MICROALBUMIN/CREAT UR: NORMAL MG/G{CREAT}

## 2025-05-05 DIAGNOSIS — I10 ESSENTIAL HYPERTENSION: ICD-10-CM

## 2025-05-05 RX ORDER — HYDROCHLOROTHIAZIDE 12.5 MG/1
12.5 TABLET ORAL DAILY
Qty: 30 TABLET | Refills: 0 | Status: SHIPPED | OUTPATIENT
Start: 2025-05-05 | End: 2025-05-06

## 2025-05-06 DIAGNOSIS — I10 ESSENTIAL HYPERTENSION: ICD-10-CM

## 2025-05-06 RX ORDER — HYDROCHLOROTHIAZIDE 12.5 MG/1
12.5 TABLET ORAL DAILY
Qty: 30 TABLET | Refills: 0 | Status: SHIPPED | OUTPATIENT
Start: 2025-05-06

## 2025-05-08 RX ORDER — SODIUM, POTASSIUM,MAG SULFATES 17.5-3.13G
1 SOLUTION, RECONSTITUTED, ORAL ORAL TAKE AS DIRECTED
Qty: 354 ML | Refills: 0 | Status: SHIPPED | OUTPATIENT
Start: 2025-05-08

## 2025-05-10 DIAGNOSIS — E89.0 POSTOPERATIVE HYPOTHYROIDISM: ICD-10-CM

## 2025-05-12 RX ORDER — LEVOTHYROXINE SODIUM 137 MCG
137 TABLET ORAL EVERY MORNING
Qty: 90 TABLET | Refills: 3 | Status: SHIPPED | OUTPATIENT
Start: 2025-05-12

## 2025-05-12 NOTE — TELEPHONE ENCOUNTER
Rx Refill Note  Requested Prescriptions     Pending Prescriptions Disp Refills    Synthroid 137 MCG tablet [Pharmacy Med Name: Synthroid 137 mcg tablet] 90 tablet 3     Sig: TAKE ONE TABLET BY MOUTH every evening      Last office visit with prescribing clinician: 3/10/2025   Last telemedicine visit with prescribing clinician: Visit date not found   Next office visit with prescribing clinician: 8/11/2025       Enedina Andujar MA  05/12/25, 08:48 EDT

## 2025-05-15 ENCOUNTER — LAB REQUISITION (OUTPATIENT)
Dept: LAB | Facility: HOSPITAL | Age: 45
End: 2025-05-15
Payer: COMMERCIAL

## 2025-05-15 ENCOUNTER — OUTSIDE FACILITY SERVICE (OUTPATIENT)
Dept: GASTROENTEROLOGY | Facility: CLINIC | Age: 45
End: 2025-05-15
Payer: COMMERCIAL

## 2025-05-15 DIAGNOSIS — K57.30 DIVERTICULOSIS OF LARGE INTESTINE WITHOUT PERFORATION OR ABSCESS WITHOUT BLEEDING: ICD-10-CM

## 2025-05-15 DIAGNOSIS — K64.8 OTHER HEMORRHOIDS: ICD-10-CM

## 2025-05-15 DIAGNOSIS — Z12.11 ENCOUNTER FOR SCREENING FOR MALIGNANT NEOPLASM OF COLON: ICD-10-CM

## 2025-05-15 DIAGNOSIS — D49.0 NEOPLASM OF UNSPECIFIED BEHAVIOR OF DIGESTIVE SYSTEM: ICD-10-CM

## 2025-05-15 PROCEDURE — 45380 COLONOSCOPY AND BIOPSY: CPT | Performed by: INTERNAL MEDICINE

## 2025-05-15 PROCEDURE — 88305 TISSUE EXAM BY PATHOLOGIST: CPT | Performed by: INTERNAL MEDICINE

## 2025-05-19 LAB
CYTO UR: NORMAL
LAB AP CASE REPORT: NORMAL
LAB AP CLINICAL INFORMATION: NORMAL
PATH REPORT.FINAL DX SPEC: NORMAL
PATH REPORT.GROSS SPEC: NORMAL

## 2025-05-20 ENCOUNTER — RESULTS FOLLOW-UP (OUTPATIENT)
Dept: LAB | Facility: HOSPITAL | Age: 45
End: 2025-05-20
Payer: COMMERCIAL

## 2025-05-20 NOTE — LETTER
"Laura Weathers  [address] [place] [address] [address] [place] [address]    May 20, 2025     Dear MsFrancisco Javier Weathers:    Below are the results from your recent visit:    Resulted Orders   Tissue Pathology Exam   Result Value Ref Range    Case Report       Surgical Pathology Report                         Case: [id number]                                  Authorizing Provider:  Bryan Dalton MD    Collected:           05/15/2025 12:47 PM          Ordering Location:     [facility]   Received:            05/15/2025 04:33 PM                                 LABORATORY                                                                   Pathologist:           Dottie Wan DO                                                       Specimen:    Large Intestine, Rectum                                                                    Clinical Information       Encounter for screening for malignant neoplasm of colon  Neoplasm of unspecified behavior of digestive system  Other hemorrhoids  Diverticulosis of large intestine without perforation or abscess without bleeding      Final Diagnosis       Large intestine, rectum, mucosal lesion, biopsy:  Polypoid fragments of benign colonic mucosa with focal mild activity, erosion, and edema, suggestive of inflammatory polyp  Negative for microscopic colitis pattern, signs of chronicity, granulomas, dysplasia, or malignancy        Gross Description       1. Large Intestine, Rectum.  In formalin labeled \"rectum, mucosal lesion\" are multiple tan, ragged soft tissue fragments aggregating 1 x 0.8 x 0.2 cm submitted entirely in a single cassette. HDM        Microscopic Description       The slides are reviewed and demonstrate histopathologic features supporting the above rendered diagnosis.           biopsy was benign tissue consistent with an inflammatory type polyp. I would recommend repeat colonoscopy 5 years.     If you have any questions or concerns, please don't hesitate " to call.         Sincerely,        Bryan Dalton MD

## 2025-06-17 RX ORDER — NORETHINDRONE 0.35 MG/1
1 TABLET ORAL DAILY
Qty: 28 TABLET | Refills: 12 | OUTPATIENT
Start: 2025-06-17

## 2025-07-08 ENCOUNTER — TELEPHONE (OUTPATIENT)
Dept: OBSTETRICS AND GYNECOLOGY | Facility: CLINIC | Age: 45
End: 2025-07-08
Payer: COMMERCIAL

## 2025-07-08 RX ORDER — ACETAMINOPHEN AND CODEINE PHOSPHATE 120; 12 MG/5ML; MG/5ML
1 SOLUTION ORAL DAILY
Qty: 28 TABLET | Refills: 0 | Status: SHIPPED | OUTPATIENT
Start: 2025-07-08

## 2025-07-08 NOTE — TELEPHONE ENCOUNTER
Caller: Laura Weathers    Relationship: Self    Best call back number: [phone]    Requested Prescriptions:   norethindrone (Coral) 0.35 MG tablet      Pharmacy where request should be sent:    [place] [address] [facility] - [phone]  - [phone]   Last office visit with prescribing clinician: 7/1/2024   Last telemedicine visit with prescribing clinician: Visit date not found   Next office visit with prescribing clinician: 7/21/2025     Additional details provided by patient:     Does the patient have less than a 3 day supply:  [x] Yes  [] No    Would you like a call back once the refill request has been completed: [x] Yes [] No    If the office needs to give you a call back, can they leave a voicemail: [x] Yes [] No    Doreen Zhong Rep   07/08/25 10:24 EDT

## 2025-07-14 DIAGNOSIS — I10 ESSENTIAL HYPERTENSION: ICD-10-CM

## 2025-07-14 DIAGNOSIS — E66.813 CLASS 3 SEVERE OBESITY WITH SERIOUS COMORBIDITY AND BODY MASS INDEX (BMI) OF 40.0 TO 44.9 IN ADULT, UNSPECIFIED OBESITY TYPE: ICD-10-CM

## 2025-07-14 DIAGNOSIS — E55.9 VITAMIN D DEFICIENCY: ICD-10-CM

## 2025-07-14 RX ORDER — HYDROCHLOROTHIAZIDE 12.5 MG/1
12.5 TABLET ORAL DAILY
Qty: 30 TABLET | Refills: 0 | Status: SHIPPED | OUTPATIENT
Start: 2025-07-14

## 2025-07-14 RX ORDER — SEMAGLUTIDE 2.4 MG/.75ML
2.4 INJECTION, SOLUTION SUBCUTANEOUS WEEKLY
Qty: 3 ML | Refills: 5 | Status: SHIPPED | OUTPATIENT
Start: 2025-07-14

## 2025-07-14 RX ORDER — TIRZEPATIDE 15 MG/.5ML
INJECTION, SOLUTION SUBCUTANEOUS
Qty: 2 ML | Refills: 5 | OUTPATIENT
Start: 2025-07-14

## 2025-07-14 RX ORDER — ERGOCALCIFEROL 1.25 MG/1
50000 CAPSULE, LIQUID FILLED ORAL WEEKLY
Qty: 5 CAPSULE | Refills: 2 | Status: SHIPPED | OUTPATIENT
Start: 2025-07-14

## 2025-07-14 NOTE — TELEPHONE ENCOUNTER
Rx Refill Note  Requested Prescriptions     Pending Prescriptions Disp Refills    Zepbound 15 MG/0.5ML solution auto-injector [Pharmacy Med Name: Zepbound 15 mg/0.5 mL subcutaneous pen injector] 2 mL 5     Sig: INJECT contents of 1 syringe (15-mg) SUBCUTANEOUSLY ONCE WEEKLY AS DIRECTED      Last office visit with prescribing clinician: 3/10/2025      Next office visit with prescribing clinician: 8/11/2025   {  Enedina Andujar MA  07/14/25, 08:51 EDT       Spoke with patient. Advised her that she would have to switch back to Wegovy in 07/2025 due to her formulary change.   Electronically Signed: Valeria Lara MD             5/27/25 11:33 AM  Yokasta Oliva routed this conversation to Valeria Lara MD Karen Louise Pierson to [facility] (supporting Valeria Lara MD)        5/27/25 11:19 AM  Good morning!  I’ve received a letter from [facility] stating they will no longer pay for my Zepbound beginning July 1st.  Sigh. They say they will cover orlistat, QSYMIA, SAXENDA, and WEGOVY.  Help. :)   Attachments   [email or web address]

## 2025-07-18 ENCOUNTER — OFFICE VISIT (OUTPATIENT)
Dept: FAMILY MEDICINE CLINIC | Facility: CLINIC | Age: 45
End: 2025-07-18
Payer: COMMERCIAL

## 2025-07-18 ENCOUNTER — LAB (OUTPATIENT)
Dept: LAB | Facility: HOSPITAL | Age: 45
End: 2025-07-18
Payer: COMMERCIAL

## 2025-07-18 VITALS
HEIGHT: 66 IN | RESPIRATION RATE: 20 BRPM | OXYGEN SATURATION: 98 % | BODY MASS INDEX: 37.61 KG/M2 | WEIGHT: 234 LBS | SYSTOLIC BLOOD PRESSURE: 140 MMHG | DIASTOLIC BLOOD PRESSURE: 96 MMHG | TEMPERATURE: 98.4 F | HEART RATE: 93 BPM

## 2025-07-18 DIAGNOSIS — Z12.31 ENCOUNTER FOR SCREENING MAMMOGRAM FOR MALIGNANT NEOPLASM OF BREAST: ICD-10-CM

## 2025-07-18 DIAGNOSIS — G47.00 INSOMNIA, UNSPECIFIED TYPE: ICD-10-CM

## 2025-07-18 DIAGNOSIS — I10 PRIMARY HYPERTENSION: Primary | Chronic | ICD-10-CM

## 2025-07-18 DIAGNOSIS — Z00.00 WELLNESS EXAMINATION: ICD-10-CM

## 2025-07-18 LAB
DEPRECATED RDW RBC AUTO: 41.9 FL (ref 37–54)
ERYTHROCYTE [DISTWIDTH] IN BLOOD BY AUTOMATED COUNT: 12.1 % (ref 12.3–15.4)
HBA1C MFR BLD: 4.7 % (ref 4.8–5.6)
HCT VFR BLD AUTO: 44.2 % (ref 34–46.6)
HGB BLD-MCNC: 15.3 G/DL (ref 12–15.9)
MCH RBC QN AUTO: 32.6 PG (ref 26.6–33)
MCHC RBC AUTO-ENTMCNC: 34.6 G/DL (ref 31.5–35.7)
MCV RBC AUTO: 94 FL (ref 79–97)
PLATELET # BLD AUTO: 262 10*3/MM3 (ref 140–450)
PMV BLD AUTO: 10.6 FL (ref 6–12)
RBC # BLD AUTO: 4.7 10*6/MM3 (ref 3.77–5.28)
WBC NRBC COR # BLD AUTO: 6.38 10*3/MM3 (ref 3.4–10.8)

## 2025-07-18 PROCEDURE — 99214 OFFICE O/P EST MOD 30 MIN: CPT | Performed by: STUDENT IN AN ORGANIZED HEALTH CARE EDUCATION/TRAINING PROGRAM

## 2025-07-18 PROCEDURE — 85027 COMPLETE CBC AUTOMATED: CPT | Performed by: STUDENT IN AN ORGANIZED HEALTH CARE EDUCATION/TRAINING PROGRAM

## 2025-07-18 PROCEDURE — 80061 LIPID PANEL: CPT | Performed by: STUDENT IN AN ORGANIZED HEALTH CARE EDUCATION/TRAINING PROGRAM

## 2025-07-18 PROCEDURE — 36415 COLL VENOUS BLD VENIPUNCTURE: CPT | Performed by: STUDENT IN AN ORGANIZED HEALTH CARE EDUCATION/TRAINING PROGRAM

## 2025-07-18 PROCEDURE — 99396 PREV VISIT EST AGE 40-64: CPT | Performed by: STUDENT IN AN ORGANIZED HEALTH CARE EDUCATION/TRAINING PROGRAM

## 2025-07-18 PROCEDURE — 83036 HEMOGLOBIN GLYCOSYLATED A1C: CPT | Performed by: STUDENT IN AN ORGANIZED HEALTH CARE EDUCATION/TRAINING PROGRAM

## 2025-07-18 PROCEDURE — 84702 CHORIONIC GONADOTROPIN TEST: CPT | Performed by: STUDENT IN AN ORGANIZED HEALTH CARE EDUCATION/TRAINING PROGRAM

## 2025-07-18 RX ORDER — ACETAMINOPHEN AND CODEINE PHOSPHATE 120; 12 MG/5ML; MG/5ML
1 SOLUTION ORAL DAILY
Qty: 90 TABLET | Refills: 3 | Status: SHIPPED | OUTPATIENT
Start: 2025-07-18

## 2025-07-18 RX ORDER — ESZOPICLONE 1 MG/1
1 TABLET, FILM COATED ORAL NIGHTLY
Qty: 10 TABLET | Refills: 0 | Status: SHIPPED | OUTPATIENT
Start: 2025-07-18

## 2025-07-18 RX ORDER — RAMELTEON 8 MG/1
8 TABLET ORAL NIGHTLY
Qty: 30 TABLET | Refills: 0 | Status: SHIPPED | OUTPATIENT
Start: 2025-07-18

## 2025-07-18 NOTE — PROGRESS NOTES
"Chief Complaint  Annual Exam    History of Present Illness      Annual exam  Colonoscopy up to date   Mammogram ordered  Pap smear up to date   Counseled on diet and exercise.   Recommend dental and eye exam  hiv hep c sti screening: pt declines  a1c /lipid screening ordered   Vaccines recommended:  covid vaccine  Tdap  Hepatitis      Insomnia  She has tried conservative measures as well as trazodone elavil. Once she falls asleep she is good but she has a lot of difficulty falling asleep. Benadryl keeps her awake. Melatonin hasn't worked.       She would like to continue remberto for birth control. She feels well on this.     The following portions of the patient's history were reviewed and updated as appropriate: allergies, current medications, past family history, past medical history, past social history, past surgical history, and problem list.    OBJECTIVE:  /96   Pulse 93   Temp 98.4 °F (36.9 °C) (Temporal)   Resp 20   Ht 167.6 cm (65.98\")   Wt 106 kg (234 lb)   SpO2 98%   BMI 37.79 kg/m²       Physical Exam  Constitutional:       General: She is not in acute distress.     Appearance: Normal appearance.   HENT:      Head: Normocephalic and atraumatic.   Eyes:      Extraocular Movements: Extraocular movements intact.   Cardiovascular:      Rate and Rhythm: Normal rate and regular rhythm.      Heart sounds: No murmur heard.  Pulmonary:      Effort: Pulmonary effort is normal. No respiratory distress.      Breath sounds: Normal breath sounds. No stridor. No wheezing, rhonchi or rales.   Skin:     Findings: No rash.   Neurological:      General: No focal deficit present.      Mental Status: She is alert.   Psychiatric:         Mood and Affect: Mood normal.                  Assessment and Plan   Diagnoses and all orders for this visit:    1. Primary hypertension (Primary)  -     Urinalysis With Microscopic - Urine, Clean Catch; Future    2. Wellness examination  -     CBC (No Diff); Future  -     " Hemoglobin A1c; Future  -     Lipid Panel; Future  -     HCG, B-subunit, Quantitative; Future    3. Encounter for screening mammogram for malignant neoplasm of breast  -     Mammo screening digital tomosynthesis bilateral w CAD; Future    4. Insomnia, unspecified type  -     ramelteon (ROZEREM) 8 MG tablet; Take 1 tablet by mouth Every Night.  Dispense: 30 tablet; Refill: 0  -     Ambulatory Referral to Sleep Medicine  -     eszopiclone (Lunesta) 1 MG tablet; Take 1 tablet by mouth Every Night. Take immediately before bedtime  Dispense: 10 tablet; Refill: 0    Other orders  -     norethindrone (Coral) 0.35 MG tablet; Take 1 tablet by mouth Daily.  Dispense: 90 tablet; Refill: 3            Annual exam  Colonoscopy up to date   Mammogram ordered  Pap smear up to date   Counseled on diet and exercise.   Recommend dental and eye exam  hiv hep c sti screening: pt declines  a1c /lipid screening ordered   Vaccines recommended:  covid vaccine  Tdap  Hepatitis        Counseled on risks of progesterone, slightly increased cancer risk or risk of cardiovascular/cerebrovascular disease. Patient feels benefit> risk as this medication is very helpful for her overall in her day to day mood.       Insomnia  Failing multiple meds and conservative measures. Give ramelteon during the week and trial of lunesta on the weekend. Counseled on risks of this med including addiction, sedation, sleep behavior that is abnormal such as falls injury or sleep driving or other abnormal sleep behavior that may result in injury to self or others. She feels benefit >risk.       A history was obtained from the patient and the following were reviewed: family history, social history, psychiatric history, and substance abuse history.  A baseline assessment was performed and includes a controlled substance agreement, urine drug screen, LORI (within 12 months prior to today's encounter), and a physical exam, if appropriate, was performed within the past  year.  It is medically appropriate to prescribe her the medication(s) above.  If applicable, prior treatment records were obtained and reviewed to justify long term prescribing of controlled substances.  The patient was educated on the following: Limited use of the medication, need to discontinue the medication when her condition resolves, proper storage and disposal of medication(s), and risks and dangers associated with controlled substances including tolerance, dependence, and addiction.  A written informed consent was obtained and includes objectives of treatment, further diagnostic testing if appropriate, and an exit strategy for discontinuing the medication on the condition resolves, if appropriate.  In addition, if appropriate, the patient will be screened for other medical conditions that may impact the prescribing of controlled substances.  Previous treatments have been tried including trials of noncontrolled substances or lower doses of controlled substances.        Continue to monitor bp, possible lack of sleep which has been severe for her.       Return in about 1 month (around 8/18/2025) for insomnia.       Tosin Santa D.O.  [facility] [place]

## 2025-07-19 LAB
CHOLEST SERPL-MCNC: 71 MG/DL (ref 0–200)
HDLC SERPL-MCNC: 30 MG/DL (ref 40–60)
LDLC SERPL CALC-MCNC: 27 MG/DL (ref 0–100)
LDLC/HDLC SERPL: 0.99 {RATIO}
TRIGL SERPL-MCNC: 56 MG/DL (ref 0–150)
VLDLC SERPL-MCNC: 14 MG/DL (ref 5–40)

## 2025-07-20 LAB — HCG INTACT+B SERPL-ACNC: 1 MIU/ML

## 2025-07-21 ENCOUNTER — OFFICE VISIT (OUTPATIENT)
Dept: OBSTETRICS AND GYNECOLOGY | Facility: CLINIC | Age: 45
End: 2025-07-21
Payer: COMMERCIAL

## 2025-07-21 VITALS
SYSTOLIC BLOOD PRESSURE: 98 MMHG | WEIGHT: 232 LBS | DIASTOLIC BLOOD PRESSURE: 78 MMHG | HEIGHT: 66 IN | BODY MASS INDEX: 37.28 KG/M2

## 2025-07-21 DIAGNOSIS — Z01.419 ENCOUNTER FOR ANNUAL ROUTINE GYNECOLOGICAL EXAMINATION: Primary | ICD-10-CM

## 2025-07-21 NOTE — PROGRESS NOTES
Gynecologic Annual Exam Note        Gynecologic Exam        Subjective     HPI  Laura Weathers is a 45 y.o.  female who presents for annual well woman exam as a established patient. There were no changes to her medical or surgical history since her last visit..  Her periods are absent secondary to birth control. The flow is none. She reports dysmenorrhea is moderate occurring throughout cycle. Marital Status: .  She is sexually active. She has not had new partners.. STD testing recommendations have been explained to the patient and she does not desire STD testing.    The patient would like to discuss the following complaints today: denies    Additional OB/GYN History   contraceptive methods: remberto  Desires to: continue contraception  Thromboembolic Disease: none  History of migraines: no  Age of menarche: 9    History of STD: no    Last Pap : 2023. Results: negative. HPV: negative.   Last Completed Pap Smear            Upcoming       PAP SMEAR (Every 3 Years) Next due on 2023  LIQUID-BASED PAP SMEAR WITH HPV GENOTYPING REGARDLESS OF INTERPRETATION (SILVESTRE,COR,MAD)    2016  SCANNED - PAP SMEAR    2016  Declined                             History of abnormal Pap smear: yes - 2004 LEEP  Gardasil status:declines  Family history of uterine, colon, breast, or ovarian cancer: no  Performs monthly Self-Breast Exam: yes  Exercises Regularly:yes  Feelings of Anxiety or Depression: yes - celexa  Tobacco Usage?: No       Current Outpatient Medications:     albuterol sulfate  (90 Base) MCG/ACT inhaler, Inhale 2 puffs Every 6 (Six) Hours As Needed for Wheezing., Disp: , Rfl:     aspirin 81 MG EC tablet, 1 tablet Daily., Disp: , Rfl:     Azelastine HCl 137 MCG/SPRAY solution, INSTILL 1 SPRAY IN EACH NOSTRIL EVERY DAY, Disp: 30 mL, Rfl: 0    citalopram (CeleXA) 20 MG tablet, TAKE ONE TABLET BY MOUTH EVERY DAY, Disp: 90 tablet, Rfl: 1    EPINEPHrine (EPIPEN)  0.3 MG/0.3ML solution auto-injector injection, 0.3 mL As Needed., Disp: , Rfl:     eszopiclone (Lunesta) 1 MG tablet, Take 1 tablet by mouth Every Night. Take immediately before bedtime, Disp: 10 tablet, Rfl: 0    fexofenadine (ALLEGRA) 180 MG tablet, Take 1 tablet by mouth Daily., Disp: , Rfl:     fluticasone (FLONASE) 50 MCG/ACT nasal spray, 2 sprays into the nostril(s) as directed by provider Daily., Disp: 16 g, Rfl: 2    hydroCHLOROthiazide 12.5 MG tablet, TAKE ONE TABLET BY MOUTH EVERY DAY, Disp: 30 tablet, Rfl: 0    lactobacillus (BACID) tablet caplet, Take  by mouth 2 (Two) Times a Day. URO brand name daily, Disp: , Rfl:     Lipitor 80 MG tablet, Take 1 tablet by mouth Daily., Disp: , Rfl:     losartan (COZAAR) 50 MG tablet, Take 1 tablet by mouth Every 12 (Twelve) Hours., Disp: , Rfl:     norethindrone (Coral) 0.35 MG tablet, Take 1 tablet by mouth Daily., Disp: 90 tablet, Rfl: 3    nystatin (MYCOSTATIN) 375082 UNIT/GM powder, Apply  topically to the appropriate area as directed 4 (Four) Times a Day., Disp: 30 g, Rfl: 0    potassium chloride (KLOR-CON) 20 MEQ packet, Take 20 mEq by mouth Daily., Disp: , Rfl:     Prenatal Vit-Fe Fumarate-FA (Prenatal Vitamin) 27-0.8 MG tablet, , Disp: , Rfl:     propranolol LA (INDERAL LA) 60 MG 24 hr capsule, TAKE ONE CAPSULE BY MOUTH EVERY DAY, Disp: 90 capsule, Rfl: 3    ramelteon (ROZEREM) 8 MG tablet, Take 1 tablet by mouth Every Night., Disp: 30 tablet, Rfl: 0    Singulair 10 MG tablet, Take 1 tablet by mouth Every Night., Disp: , Rfl:     Synthroid 137 MCG tablet, Take 1 tablet by mouth Every Morning., Disp: 90 tablet, Rfl: 3    vitamin D (ERGOCALCIFEROL) 1.25 MG (13234 UT) capsule capsule, TAKE ONE CAPSULE BY MOUTH ONCE WEEKLY, Disp: 5 capsule, Rfl: 2    Wegovy 2.4 MG/0.75ML solution auto-injector, Inject 0.75 mL under the skin into the appropriate area as directed 1 (One) Time Per Week., Disp: 3 mL, Rfl: 5     Patient is requesting refills of coral OCP.    OB  History          2    Para   2    Term   2            AB        Living   2         SAB        IAB        Ectopic        Molar        Multiple        Live Births   2                Health Maintenance   Topic Date Due    MAMMOGRAM  2025    COVID-19 Vaccine ( season) 2025 (Originally 2024)    INFLUENZA VACCINE  10/01/2025    PAP SMEAR  2026    ANNUAL PHYSICAL  2026    LIPID PANEL  2026    Annual Gynecologic Pelvic and Breast Exam  2026    TDAP/TD VACCINES (2 - Td or Tdap) 2026    COLORECTAL CANCER SCREENING  05/15/2035    HEPATITIS C SCREENING  Completed    Pneumococcal Vaccine 0-49  Aged Out       Past Medical History:   Diagnosis Date    Abnormal Pap smear of cervix     Allergic     Depression     Follicular thyroid cancer 2017    T3N0M0 follicular thyroid cancer s/p two stage thyroidectomy and I -131 remnant ablation    Gestational hypertension     Headache     Sinus    Heart attack     HL (hearing loss) 2024    Hypertension     Migraine     PMS (premenstrual syndrome)     Postoperative hypothyroidism 2013    Preeclampsia     delivered, with a postpartum complication    Tennis elbow     Vitamin D deficiency         Past Surgical History:   Procedure Laterality Date    CARDIAC CATHETERIZATION      CARDIAC SURGERY      stints    CERVICAL BIOPSY  W/ LOOP ELECTRODE EXCISION       SECTION      SINUS SURGERY  10/2019    another in 3/2021    SINUS SURGERY Left 2021    THYROIDECTOMY      two stage thyroidectomy       The additional following portions of the patient's history were reviewed and updated as appropriate: allergies, current medications, past family history, past medical history, past social history, past surgical history, and problem list.    Review of Systems   Constitutional: Negative.    Respiratory: Negative.     Cardiovascular: Negative.    Gastrointestinal: Negative.    Genitourinary:  "Negative.    Musculoskeletal: Negative.    Neurological: Negative.    Psychiatric/Behavioral: Negative.           I have reviewed and agree with the HPI, ROS, and historical information as entered above. Humble Rodriguez MD          Objective   BP 98/78   Ht 167.6 cm (65.98\")   Wt 105 kg (232 lb)   LMP 07/15/2023 Comment: denies pregnancy  BMI 37.47 kg/m²     Physical Exam  Vitals reviewed. Exam conducted with a chaperone present.   Constitutional:       Appearance: Normal appearance.   HENT:      Head: Normocephalic and atraumatic.   Cardiovascular:      Rate and Rhythm: Normal rate and regular rhythm.   Pulmonary:      Effort: Pulmonary effort is normal.      Breath sounds: Normal breath sounds.   Chest:   Breasts:     Right: Normal.      Left: Normal.   Abdominal:      General: Abdomen is flat.      Palpations: Abdomen is soft.   Genitourinary:     General: Normal vulva.      Vagina: Normal.      Cervix: Normal.      Uterus: Normal.       Adnexa: Right adnexa normal and left adnexa normal.            Comments: Endocervical polyp noted  Musculoskeletal:      Cervical back: Neck supple.   Skin:     General: Skin is warm and dry.   Neurological:      Mental Status: She is alert and oriented to person, place, and time.   Psychiatric:         Mood and Affect: Mood normal.         Behavior: Behavior normal.            Assessment and Plan    Problem List Items Addressed This Visit    None  Visit Diagnoses         Encounter for annual routine gynecological examination    -  Primary            GYN annual well woman exam.   Reviewed pap guidelines.   Reviewed risks/benefits of hormonal contraception after age 35, including possible increased risk of breast cancer, heart disease, blood clots and strokes.  Patient strongly desires to stay on hormonal contraception.  Recommended use of Vitamin D replacement and getting adequate calcium in her diet. (1500mg)  Reviewed monthly self breast exams.  Instructed to call " with lumps, pain, or breast discharge.    Reviewed exercise as a preventative health measures.   Reccommended Flu Vaccine in Fall of each year.  RTC in 1 year or PRN with problems  Return in about 1 year (around 7/21/2026) for Annual physical.    Humble Rodriguez MD  07/21/2025

## 2025-07-23 LAB — DRUGS UR: NORMAL

## 2025-07-24 ENCOUNTER — TELEPHONE (OUTPATIENT)
Dept: FAMILY MEDICINE CLINIC | Facility: CLINIC | Age: 45
End: 2025-07-24
Payer: COMMERCIAL

## 2025-07-24 NOTE — TELEPHONE ENCOUNTER
Fax notification received that PA has been started for Ramelteon (key [id number])    No. ROSA screening performed.  STOP BANG Legend: 0-2 = LOW Risk; 3-4 = INTERMEDIATE Risk; 5-8 = HIGH Risk

## 2025-08-11 ENCOUNTER — OFFICE VISIT (OUTPATIENT)
Dept: ENDOCRINOLOGY | Facility: CLINIC | Age: 45
End: 2025-08-11
Payer: COMMERCIAL

## 2025-08-11 VITALS
HEIGHT: 66 IN | OXYGEN SATURATION: 98 % | DIASTOLIC BLOOD PRESSURE: 68 MMHG | SYSTOLIC BLOOD PRESSURE: 115 MMHG | BODY MASS INDEX: 37.77 KG/M2 | HEART RATE: 78 BPM | WEIGHT: 235 LBS

## 2025-08-11 DIAGNOSIS — C73 FOLLICULAR THYROID CANCER: Primary | ICD-10-CM

## 2025-08-11 DIAGNOSIS — E89.0 POSTOPERATIVE HYPOTHYROIDISM: ICD-10-CM

## 2025-08-11 DIAGNOSIS — E66.813 CLASS 3 SEVERE OBESITY WITH SERIOUS COMORBIDITY AND BODY MASS INDEX (BMI) OF 40.0 TO 44.9 IN ADULT, UNSPECIFIED OBESITY TYPE: ICD-10-CM

## 2025-08-11 PROCEDURE — 84443 ASSAY THYROID STIM HORMONE: CPT | Performed by: INTERNAL MEDICINE

## 2025-08-11 PROCEDURE — 76536 US EXAM OF HEAD AND NECK: CPT | Performed by: INTERNAL MEDICINE

## 2025-08-11 PROCEDURE — 84439 ASSAY OF FREE THYROXINE: CPT | Performed by: INTERNAL MEDICINE

## 2025-08-11 PROCEDURE — 99213 OFFICE O/P EST LOW 20 MIN: CPT | Performed by: INTERNAL MEDICINE

## 2025-08-12 LAB
T4 FREE SERPL-MCNC: 1.54 NG/DL (ref 0.92–1.68)
TSH SERPL DL<=0.05 MIU/L-ACNC: 1.16 UIU/ML (ref 0.27–4.2)

## 2025-08-16 ENCOUNTER — RESULTS FOLLOW-UP (OUTPATIENT)
Dept: ENDOCRINOLOGY | Facility: CLINIC | Age: 45
End: 2025-08-16
Payer: COMMERCIAL

## 2025-08-18 ENCOUNTER — OFFICE VISIT (OUTPATIENT)
Dept: FAMILY MEDICINE CLINIC | Facility: CLINIC | Age: 45
End: 2025-08-18
Payer: COMMERCIAL

## 2025-08-18 VITALS
SYSTOLIC BLOOD PRESSURE: 116 MMHG | DIASTOLIC BLOOD PRESSURE: 84 MMHG | RESPIRATION RATE: 18 BRPM | HEART RATE: 76 BPM | BODY MASS INDEX: 37.48 KG/M2 | WEIGHT: 233.2 LBS | TEMPERATURE: 97.5 F | HEIGHT: 66 IN | OXYGEN SATURATION: 98 %

## 2025-08-18 DIAGNOSIS — I10 ESSENTIAL HYPERTENSION: ICD-10-CM

## 2025-08-18 DIAGNOSIS — G47.00 INSOMNIA, UNSPECIFIED TYPE: ICD-10-CM

## 2025-08-18 PROCEDURE — 99213 OFFICE O/P EST LOW 20 MIN: CPT | Performed by: STUDENT IN AN ORGANIZED HEALTH CARE EDUCATION/TRAINING PROGRAM

## 2025-08-18 RX ORDER — RAMELTEON 8 MG/1
8 TABLET ORAL NIGHTLY
Qty: 30 TABLET | Refills: 3 | Status: SHIPPED | OUTPATIENT
Start: 2025-08-18

## 2025-08-18 RX ORDER — HYDROCHLOROTHIAZIDE 12.5 MG/1
12.5 TABLET ORAL DAILY
Qty: 30 TABLET | Refills: 0 | Status: SHIPPED | OUTPATIENT
Start: 2025-08-18

## 2025-08-21 ENCOUNTER — HOSPITAL ENCOUNTER (OUTPATIENT)
Dept: MAMMOGRAPHY | Facility: HOSPITAL | Age: 45
Discharge: HOME | End: 2025-08-21
Admitting: STUDENT IN AN ORGANIZED HEALTH CARE EDUCATION/TRAINING PROGRAM
Payer: COMMERCIAL

## 2025-08-21 DIAGNOSIS — Z12.31 ENCOUNTER FOR SCREENING MAMMOGRAM FOR MALIGNANT NEOPLASM OF BREAST: ICD-10-CM

## 2025-08-21 PROCEDURE — 77067 SCR MAMMO BI INCL CAD: CPT

## 2025-08-21 PROCEDURE — 77063 BREAST TOMOSYNTHESIS BI: CPT
